# Patient Record
Sex: FEMALE | Race: WHITE | NOT HISPANIC OR LATINO | Employment: OTHER | URBAN - METROPOLITAN AREA
[De-identification: names, ages, dates, MRNs, and addresses within clinical notes are randomized per-mention and may not be internally consistent; named-entity substitution may affect disease eponyms.]

---

## 2017-03-23 ENCOUNTER — ALLSCRIPTS OFFICE VISIT (OUTPATIENT)
Dept: OTHER | Facility: OTHER | Age: 37
End: 2017-03-23

## 2017-04-03 ENCOUNTER — ALLSCRIPTS OFFICE VISIT (OUTPATIENT)
Dept: OTHER | Facility: OTHER | Age: 37
End: 2017-04-03

## 2017-05-05 ENCOUNTER — GENERIC CONVERSION - ENCOUNTER (OUTPATIENT)
Dept: OTHER | Facility: OTHER | Age: 37
End: 2017-05-05

## 2017-05-11 ENCOUNTER — ALLSCRIPTS OFFICE VISIT (OUTPATIENT)
Dept: OTHER | Facility: OTHER | Age: 37
End: 2017-05-11

## 2017-05-11 ENCOUNTER — GENERIC CONVERSION - ENCOUNTER (OUTPATIENT)
Dept: OTHER | Facility: OTHER | Age: 37
End: 2017-05-11

## 2017-05-23 ENCOUNTER — GENERIC CONVERSION - ENCOUNTER (OUTPATIENT)
Dept: OTHER | Facility: OTHER | Age: 37
End: 2017-05-23

## 2017-05-30 ENCOUNTER — ALLSCRIPTS OFFICE VISIT (OUTPATIENT)
Dept: OTHER | Facility: OTHER | Age: 37
End: 2017-05-30

## 2017-06-02 ENCOUNTER — GENERIC CONVERSION - ENCOUNTER (OUTPATIENT)
Dept: OTHER | Facility: OTHER | Age: 37
End: 2017-06-02

## 2017-06-20 ENCOUNTER — ALLSCRIPTS OFFICE VISIT (OUTPATIENT)
Dept: OTHER | Facility: OTHER | Age: 37
End: 2017-06-20

## 2017-06-20 ENCOUNTER — GENERIC CONVERSION - ENCOUNTER (OUTPATIENT)
Dept: OTHER | Facility: OTHER | Age: 37
End: 2017-06-20

## 2017-06-20 DIAGNOSIS — M79.10 MYALGIA: ICD-10-CM

## 2017-06-21 LAB — LYME IGG/IGM AB (HISTORICAL): <0.91 ISR (ref 0–0.9)

## 2017-06-22 ENCOUNTER — GENERIC CONVERSION - ENCOUNTER (OUTPATIENT)
Dept: OTHER | Facility: OTHER | Age: 37
End: 2017-06-22

## 2017-06-24 ENCOUNTER — GENERIC CONVERSION - ENCOUNTER (OUTPATIENT)
Dept: OTHER | Facility: OTHER | Age: 37
End: 2017-06-24

## 2017-06-26 ENCOUNTER — ALLSCRIPTS OFFICE VISIT (OUTPATIENT)
Dept: OTHER | Facility: OTHER | Age: 37
End: 2017-06-26

## 2017-07-07 ENCOUNTER — GENERIC CONVERSION - ENCOUNTER (OUTPATIENT)
Dept: OTHER | Facility: OTHER | Age: 37
End: 2017-07-07

## 2017-08-08 ENCOUNTER — ALLSCRIPTS OFFICE VISIT (OUTPATIENT)
Dept: OTHER | Facility: OTHER | Age: 37
End: 2017-08-08

## 2017-08-09 LAB
A/G RATIO (HISTORICAL): 1.8 (ref 1.2–2.2)
ALBUMIN SERPL BCP-MCNC: 4.2 G/DL (ref 3.5–5.5)
ALP SERPL-CCNC: 50 IU/L (ref 39–117)
ALT SERPL W P-5'-P-CCNC: 18 IU/L (ref 0–32)
AST SERPL W P-5'-P-CCNC: 15 IU/L (ref 0–40)
BASOPHILS # BLD AUTO: 0 %
BASOPHILS # BLD AUTO: 0 X10E3/UL (ref 0–0.2)
BILIRUB SERPL-MCNC: 1.2 MG/DL (ref 0–1.2)
BUN SERPL-MCNC: 9 MG/DL (ref 6–20)
BUN/CREA RATIO (HISTORICAL): 11 (ref 9–23)
CALCIUM SERPL-MCNC: 9.2 MG/DL (ref 8.7–10.2)
CHLORIDE SERPL-SCNC: 103 MMOL/L (ref 96–106)
CHOLEST SERPL-MCNC: 205 MG/DL (ref 100–199)
CO2 SERPL-SCNC: 21 MMOL/L (ref 18–29)
CREAT SERPL-MCNC: 0.81 MG/DL (ref 0.57–1)
DEPRECATED RDW RBC AUTO: 13.1 % (ref 12.3–15.4)
EGFR AFRICAN AMERICAN (HISTORICAL): 110 ML/MIN/1.73
EGFR-AMERICAN CALC (HISTORICAL): 95 ML/MIN/1.73
EOSINOPHIL # BLD AUTO: 0.2 X10E3/UL (ref 0–0.4)
EOSINOPHIL # BLD AUTO: 2 %
GLUCOSE SERPL-MCNC: 99 MG/DL (ref 65–99)
HCT VFR BLD AUTO: 40.3 % (ref 34–46.6)
HDLC SERPL-MCNC: 71 MG/DL
HGB BLD-MCNC: 13.6 G/DL (ref 11.1–15.9)
IMM.GRANULOCYTES (CD4/8) (HISTORICAL): 0 %
IMM.GRANULOCYTES (CD4/8) (HISTORICAL): 0 X10E3/UL (ref 0–0.1)
LDLC SERPL CALC-MCNC: 97 MG/DL (ref 0–99)
LYMPHOCYTES # BLD AUTO: 2.2 X10E3/UL (ref 0.7–3.1)
LYMPHOCYTES # BLD AUTO: 28 %
MCH RBC QN AUTO: 29.4 PG (ref 26.6–33)
MCHC RBC AUTO-ENTMCNC: 33.7 G/DL (ref 31.5–35.7)
MCV RBC AUTO: 87 FL (ref 79–97)
MONOCYTES # BLD AUTO: 0.7 X10E3/UL (ref 0.1–0.9)
MONOCYTES (HISTORICAL): 9 %
NEUTROPHILS # BLD AUTO: 4.8 X10E3/UL (ref 1.4–7)
NEUTROPHILS # BLD AUTO: 61 %
PLATELET # BLD AUTO: 231 X10E3/UL (ref 150–379)
POTASSIUM SERPL-SCNC: 4.2 MMOL/L (ref 3.5–5.2)
RBC (HISTORICAL): 4.63 X10E6/UL (ref 3.77–5.28)
SODIUM SERPL-SCNC: 139 MMOL/L (ref 134–144)
TOT. GLOBULIN, SERUM (HISTORICAL): 2.4 G/DL (ref 1.5–4.5)
TOTAL PROTEIN (HISTORICAL): 6.6 G/DL (ref 6–8.5)
TRIGL SERPL-MCNC: 186 MG/DL (ref 0–149)
TSH SERPL DL<=0.05 MIU/L-ACNC: 1.21 UIU/ML (ref 0.45–4.5)
WBC # BLD AUTO: 7.8 X10E3/UL (ref 3.4–10.8)

## 2017-08-10 ENCOUNTER — GENERIC CONVERSION - ENCOUNTER (OUTPATIENT)
Dept: OTHER | Facility: OTHER | Age: 37
End: 2017-08-10

## 2017-08-29 ENCOUNTER — GENERIC CONVERSION - ENCOUNTER (OUTPATIENT)
Dept: OTHER | Facility: OTHER | Age: 37
End: 2017-08-29

## 2017-10-02 ENCOUNTER — GENERIC CONVERSION - ENCOUNTER (OUTPATIENT)
Dept: OTHER | Facility: OTHER | Age: 37
End: 2017-10-02

## 2017-12-17 ENCOUNTER — OFFICE VISIT (OUTPATIENT)
Dept: URGENT CARE | Facility: CLINIC | Age: 37
End: 2017-12-17
Payer: COMMERCIAL

## 2017-12-17 PROCEDURE — 99213 OFFICE O/P EST LOW 20 MIN: CPT

## 2017-12-20 NOTE — PROGRESS NOTES
Assessment  1  Acute sinusitis (461 9) (J01 90)    Plan  Acute sinusitis    · Cephalexin 500 MG Oral Tablet (Cephalexin Monohydrate); take 1 tablet everytwelve hours    Discussion/Summary  Discussion Summary:   Follow up with pcptake meds as directedkeflex given due to interactions with zpak, was told not to use zpak due to use of other medsno reaction in past with keflex,continue flonase and zyrtec daily  Medication Side Effects Reviewed: Possible side effects of new medications were reviewed with the patient/guardian today  Understands and agrees with treatment plan: The treatment plan was reviewed with the patient/guardian  The patient/guardian understands and agrees with the treatment plan   Counseling Documentation With Imm: The patient was counseled regarding instructions for management,-- patient and family education,-- importance of compliance with treatment  Follow Up Instructions: Follow Up with your Primary Care Provider in 1-2 days  If your symptoms worsen, go to the nearest [place] [address] Emergency Department  Chief Complaint  1  Cold Symptoms  Chief Complaint Free Text Note Form: pt reports sinus pressure, wheezing, rash on face, feels like chest is on fire; denies pain      History of Present Illness  HPI: 31 yo female who is here today with sinus pressure and cough for one week, no meds taken but dayquil and some relief but not much, no fevers  Hospital Based Practices Required Assessment:  Pain Assessment  the patient states they do not have pain  Abuse And Domestic Violence Screen   Yes, the patient is safe at home  -- The patient states no one is hurting them  Depression And Suicide Screen  No, the patient has not had thoughts of hurting themself  No, the patient has not felt depressed in the past 7 days  Prefered Language is  english  Primary Language is  english  Review of Systems  Focused-Female:  Constitutional: as noted in HPI   ENT: as noted in HPI    Cardiovascular: no complaints of slow or fast heart rate, no chest pain, no palpitations, no leg claudication or lower extremity edema  Respiratory: as noted in HPI  ROS Reviewed:   ROS reviewed  Active Problems  1  Abnormal ankle brachial index (796 4) (R68 89)   2  Allergic conjunctivitis (372 14) (H10 10)   3  Benign paroxysmal positional vertigo of right ear (386 11) (H81 11)   4  Cardiac pacemaker (V45 01) (Z95 0)   5  Chronic sinusitis (473 9) (J32 9)   6  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)   7  Ecchymosis (459 89) (R58)   8  Environmental allergies (V15 09) (Z91 09)   9  Fatigue (780 79) (R53 83)   10  Gilbert's syndrome (277 4) (E80 4)   11  Headache (784 0) (R51)   12  Muscle pain (729 1) (M79 1)   13  Neoplasm of uncertain behavior of skin (238 2) (D48 5)   14  Neuralgia (729 2) (M79 2)   15  Palpitations (785 1) (R00 2)   16  Right bundle branch block (426 4) (I45 10)   17  Sinoatrial node dysfunction (427 81) (I49 5)    Past Medical History  1  History of Acute serous otitis media, unspecified laterality   2  History of Cath RVOT Subpulmonary Obstruction   3  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)   4  History of Contact dermatitis due to other agent (692 89) (L25 8)   5  History of acute sinusitis (V12 69) (Z87 09)   6  History of atrial fibrillation (V12 59) (Z86 79)   7  History of eczema (V13 3) (Z87 2)   8  History of gastroesophageal reflux (GERD) (V12 79) (Z87 19)   9  History of hematuria (V13 09) (Z87 448)   10  History of low back pain (V13 59) (Z87 39)   11  History of migraine (V12 49) (Z86 69)   12  History of ovarian cyst (V13 29) (Z87 42)   13  History of sick sinus syndrome (V12 59) (Z86 79)   14  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)   15  History of Neuritis (729 2) (M79 2)   16  Personal history of urinary tract infection (V13 02) (Z15 924)   17   History of Small Intestine Torsion (560 2)  Active Problems And Past Medical History Reviewed: The active problems and past medical history were reviewed and updated today  Family History  Father    1  Family history of Cancer  Sister    2  Family history of Colon Cancer (V16 0)  Maternal Grandmother    3  Family history of Coronary Artery Disease (V17 49)   4  Family history of Hyperlipidemia  Paternal Grandmother    11  Family history of Diabetes Mellitus (V18 0)   6  Family history of Hypertension (V17 49)  Paternal Grandfather    9  Family history of Coronary Artery Disease (V17 49)   8  Family history of Hyperlipidemia  Family History Reviewed: The family history was reviewed and updated today  Social History   · Being A Social Drinker   · Never a smoker  Social History Reviewed: The social history was reviewed and updated today  The social history was reviewed and is unchanged  Surgical History  1  History of Adenoidectomy   2  History of Atrial Septectomy Balloon Transvenous Method   3  History of Laparoscopic Lysis Of Intestinal Adhesions   4  History of Oophorectomy   5  History of Pacemaker Permanent Placement   6  History of Tonsillectomy   7  History of Transposition Repair Mustard Procedure   8  History of Tubal Ligation  Surgical History Reviewed: The surgical history was reviewed and updated today  Current Meds   1  Allegra Allergy 180 MG Oral Tablet; Therapy: (Recorded:14Jbv0637) to Recorded   2  Aspirin 81 MG TABS; Therapy: 33OHH5661 to Recorded   3  Aviane 0 1-20 MG-MCG Oral Tablet; Therapy: 86Okd6698 to Recorded   4  Fluticasone Propionate 50 MCG/ACT Nasal Suspension; 2 sprays each nostril daily; Therapy: 41GSH0810 to (Evaluate:44Mky2771); Last Rx:[id number] Ordered   5  Meclizine HCl - 25 MG Oral Tablet; TAKE 1 TABLET 4 TIMES DAILY AS NEEDED FOR DIZZINESS; Therapy: 78MOC1610 to (Evaluate:02Apr2017)  Requested for: 78STC3671; Last Rx:23Mar2017 Ordered   6   Olopatadine HCl - 0 1 % Ophthalmic Solution; INSTILL 1 DROP INTO AFFECTED EYE(S) TWICE DAILY AS DIRECTED; Therapy: 56TEM4475 to (Last Elwyn Elam)  Requested for: 20Jun2017 Ordered   7  Sotalol HCl - 80 MG Oral Tablet; Take 1 tablet twice daily Recorded   8  Vitamin C TABS; Therapy: (Recorded:04Mar2013) to Recorded  Medication List Reviewed: The medication list was reviewed and updated today  Allergies  1  Penicillins   2  Enalapril Maleate TABS    Vitals  Signs   Recorded: 29Ncm6522 10:56AM   Temperature: 99 1 F  Heart Rate: 91  Respiration: 18  Systolic: 610  Diastolic: 59  Height: 5 ft 3 in  Weight: 133 lb   BMI Calculated: 23 56  BSA Calculated: 1 63  O2 Saturation: 97  Pain Scale: 0    Physical Exam   Constitutional  General appearance: No acute distress, well appearing and well nourished  Eyes  Conjunctiva and lids: No swelling, erythema or discharge  Pupils and irises: Equal, round and reactive to light  Ears, Nose, Mouth, and Throat  External inspection of ears and nose: Normal    Otoscopic examination: Abnormal  -- TMs visualized also a reflex on the right side only  Nasal mucosa, septum, and turbinates: Abnormal  -- turbinates are red and inflamed with thick yellowish-green mucus in the right near left naris turbinates are pink and boggy with thick mucus noted  Oropharynx: Abnormal  -- yellowish-green mucus in the back of the oropharynx postnasal drip evident  Pulmonary  Respiratory effort: No increased work of breathing or signs of respiratory distress  -- mild cough  Auscultation of lungs: Clear to auscultation  Cardiovascular  Auscultation of heart: Abnormal  -- loud murmur heard patient aware  Skin  Skin and subcutaneous tissue: Normal without rashes or lesions  Psychiatric  Orientation to person, place, and time: Normal    Mood and affect: Normal        Signatures   Electronically signed by :  SAE Caballero; Dec 17 2017  1:21PM EST                       (Author)    Electronically signed by : Manolo Lobato DO; Dec 19 2017 10:40AM EST (Co-author)

## 2018-01-09 ENCOUNTER — ALLSCRIPTS OFFICE VISIT (OUTPATIENT)
Dept: OTHER | Facility: OTHER | Age: 38
End: 2018-01-09

## 2018-01-09 DIAGNOSIS — J01.01 ACUTE RECURRENT MAXILLARY SINUSITIS: ICD-10-CM

## 2018-01-09 DIAGNOSIS — J01.90 ACUTE SINUSITIS: ICD-10-CM

## 2018-01-09 DIAGNOSIS — J32.9 CHRONIC SINUSITIS: ICD-10-CM

## 2018-01-10 NOTE — RESULT NOTES
Verified Results  (1) CBC/PLT/DIFF 04Apr2016 07:46AM [facility]     Test Name Result Flag Reference   WBC 7 3 x10E3/uL  3 4-10 8   RBC 4 74 x10E6/uL  3 77-5 28   Hemoglobin 14 0 g/dL  11 1-15 9   Hematocrit 41 8 %  34 0-46  6   MCV 88 fL  79-97   MCH 29 5 pg  26 6-33 0   MCHC 33 5 g/dL  31 5-35 7   RDW 12 7 %  12 3-15 4   Platelets 830 T27G5/NH  150-379   Neutrophils 68 %     Lymphs 22 %     Monocytes 7 %     Eos 3 %     Basos 0 %     Neutrophils (Absolute) 4 9 x10E3/uL  1 4-7 0   Lymphs (Absolute) 1 6 x10E3/uL  0 7-3 1   Monocytes(Absolute) 0 5 x10E3/uL  0 1-0 9   Eos (Absolute) 0 2 x10E3/uL  0 0-0 4   Baso (Absolute) 0 0 x10E3/uL  0 0-0 2   Immature Granulocytes 0 %     Immature Grans (Abs) 0 0 x10E3/uL  0 0-0 1     (1) COMPREHENSIVE METABOLIC PANEL [id number] 12:13QE [facility]     Test Name Result Flag Reference   Glucose, Serum 85 mg/dL  65-99   BUN 7 mg/dL  6-20   Creatinine, Serum 0 81 mg/dL  0 57-1 00   eGFR If NonAfricn Am 96 mL/min/1 73  >59   eGFR If Africn Am 110 mL/min/1 73  >59   BUN/Creatinine Ratio 9  8-20   Sodium, Serum 135 mmol/L  134-144   Potassium, Serum 4 0 mmol/L  3 5-5 2   Chloride, Serum 97 mmol/L     Carbon Dioxide, Total 21 mmol/L  18-29   Calcium, Serum 9 3 mg/dL  8 7-10 2   Protein, Total, Serum 6 8 g/dL  6 0-8 5   Albumin, Serum 4 4 g/dL  3 5-5 5   Globulin, Total 2 4 g/dL  1 5-4 5   A/G Ratio 1 8  1 1-2 5   Bilirubin, Total 1 4 mg/dL H 0 0-1 2   Alkaline Phosphatase, S 40 IU/L     AST (SGOT) 23 IU/L  0-40   ALT (SGPT) 20 IU/L  0-32     (1) LIPID PANEL, FASTING 04Apr2016 07:46AM [facility]     Test Name Result Flag Reference   Cholesterol, Total 191 mg/dL  100-199   Triglycerides 150 mg/dL H 0-149   HDL Cholesterol 68 mg/dL  >39   According to ATP-III Guidelines, HDL-C >59 mg/dL is considered a  negative risk factor for CHD  VLDL Cholesterol [place] 30 mg/dL  5-40   LDL Cholesterol Calc 93 mg/dL  0-99   T  Chol/HDL Ratio 2 8 ratio units  0 0-4 4   T   Chol/HDL Ratio Men  Women                                               1/2 Avg  Risk  3 4    3 3                                                   Avg Risk  5 0    4 4                                                2X Avg  Risk  9 6    7 1                                                3X Avg  Risk 23 4   11 0

## 2018-01-10 NOTE — RESULT NOTES
Verified Results  (1) CBC/PLT/DIFF [id number] 06:41AM [facility]     Test Name Result Flag Reference   WBC 7 8 x10E3/uL  3 4-10 8   RBC 4 63 x10E6/uL  3 77-5 28   Hemoglobin 13 6 g/dL  11 1-15 9   Hematocrit 40 3 %  34 0-46  6   MCV 87 fL  79-97   MCH 29 4 pg  26 6-33 0   MCHC 33 7 g/dL  31 5-35 7   RDW 13 1 %  12 3-15 4   Platelets 902 S40A2/ET  150-379   Neutrophils 61 %     Lymphs 28 %     Monocytes 9 %     Eos 2 %     Basos 0 %     Neutrophils (Absolute) 4 8 x10E3/uL  1 4-7 0   Lymphs (Absolute) 2 2 x10E3/uL  0 7-3 1   Monocytes(Absolute) 0 7 x10E3/uL  0 1-0 9   Eos (Absolute) 0 2 x10E3/uL  0 0-0 4   Baso (Absolute) 0 0 x10E3/uL  0 0-0 2   Immature Granulocytes 0 %     Immature Grans (Abs) 0 0 x10E3/uL  0 0-0 1     (1) COMPREHENSIVE METABOLIC PANEL [id number] 03:52LT [facility]     Test Name Result Flag Reference   Glucose, Serum 99 mg/dL  65-99   BUN 9 mg/dL  6-20   Creatinine, Serum 0 81 mg/dL  0 57-1 00   BUN/Creatinine Ratio 11  9-23   Sodium, Serum 139 mmol/L  134-144   Potassium, Serum 4 2 mmol/L  3 5-5 2   Chloride, Serum 103 mmol/L     Carbon Dioxide, Total 21 mmol/L  18-29   Calcium, Serum 9 2 mg/dL  8 7-10 2   Protein, Total, Serum 6 6 g/dL  6 0-8 5   Albumin, Serum 4 2 g/dL  3 5-5 5   Globulin, Total 2 4 g/dL  1 5-4 5   A/G Ratio 1 8  1 2-2 2   Bilirubin, Total 1 2 mg/dL  0 0-1 2   Alkaline Phosphatase, S 50 IU/L     AST (SGOT) 15 IU/L  0-40   ALT (SGPT) 18 IU/L  0-32   eGFR If NonAfricn Am 95 mL/min/1 73  >59   eGFR If Africn Am 110 mL/min/1 73  >59     (1) LIPID PANEL FASTING W DIRECT LDL REFLEX [id number] 06:41AM [facility]     Test Name Result Flag Reference   Cholesterol, Total 205 mg/dL H 100-199   Triglycerides 186 mg/dL H 0-149   HDL Cholesterol 71 mg/dL  >39   LDL Cholesterol Calc 97 mg/dL  0-99     (1) TSH [id number] 06:41AM [facility]     Test Name Result Flag Reference   TSH 1 210 uIU/mL  0 450-4 500

## 2018-01-11 NOTE — PROGRESS NOTES
Assessment   1  Allergic rhinitis, unspecified allergic rhinitis type (477 9) (J30 9)  2  Encounter for preventive health examination (V70 0) (Z00 00)  3  Fatigue (780 79) (R53 83)    Plan  Abdominal pain, right upper quadrant, Nausea    · Stop: Ondansetron 8 MG Oral Tablet Dispersible    Discussion/Summary  health maintenance visit Currently, she eats a healthy diet  cervical cancer screening is managed by GYN  Breast cancer screening: breast cancer screening is managed by GYN  Colorectal cancer screening: colorectal cancer screening is not indicated  Osteoporosis screening: bone mineral density testing is not indicated  Advice and education were given regarding nutrition  Patient discussion: discussed with the patient  Patient to continue routine pacer checks, and cardiology visits  Recommend 3 meals daily with protein  RTO as needed  Chief Complaint  PT IS HERE FOR HER YEARLY WELLNESS EXAM AND REVIEW RECENT LABS  PT IS UP TO DATE ON HER TDAP  GYN APPT IS DUE IN JULY  History of Present Illness  HM, Adult Female: The patient is being seen for a health maintenance evaluation  General Health:   Screening:   HPI: 37year old white female presents for PE  Patient has a pacemaker, gets checked regularly  Has GYN for well woman exam, last pap July 2015  Blood work done recently, and was called about results  Review of Systems    Constitutional: feeling tired, but no fever and no chills  Eyes: eyesight problems and Wears eye glasses, last exam 1/16  ENT: no earache, no sore throat and no nasal discharge  Cardiovascular: no chest pain and no palpitations  Respiratory: no shortness of breath, no cough and no shortness of breath during exertion  Gastrointestinal: no abdominal pain, no nausea and no vomiting  Neurological: no headache, no numbness, no tingling, no dizziness and no fainting  Psychiatric: not suicidal, no anxiety, no sleep disturbances and no depression       Over the past 2 weeks, how often have you been bothered by the following problems? 1 ) Little interest or pleasure in doing things? Not at all    2 ) Feeling down, depressed or hopeless? Not at all    3 ) Trouble falling asleep or sleeping too much? Not at all    4 ) Feeling tired or having little energy? Several days  5 ) Poor appetite or overeating? Not at all    6 ) Feeling bad about yourself, or that you are a failure, or have let yourself or your family down? Not at all    7 ) Trouble concentrating on things, such as reading a newspaper or watching television? Not at all    8 ) Moving or speaking so slowly that other people could have noticed, or the opposite, moving or speaking faster than usual? Not at all    9 ) Thoughts that you would be better off dead or of hurting yourself in some way? 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)  4  Fatigue (780 79) (R53 83)  5  Gilbert's syndrome (277 4) (E80 4)  6  Nausea (787 02) (R11 0)  7  Right bundle branch block (426 4) (I45 10)  8   Sinoatrial node dysfunction (427 81) (I49 5)    Past Medical History    · History of Abdominal pain, suprapubic (789 09) (R10 2)   · History of Acute serous otitis media, unspecified laterality   · History of Cath RVOT Subpulmonary Obstruction   · History of Dysfunction of right Eustachian tube (381 81) (H69 81)   · History of abdominal pain (V13 89) (T93 918)   · History of abdominal pain (V13 89) (M36 906)   · History of atrial fibrillation (V12 59) (Z86 79)   · History of eczema (V13 3) (Z87 2)   · History of eczema (V13 3) (Z87 2)   · History of gastroesophageal reflux (GERD) (V12 79) (Z87 19)   · History of hematuria (V13 09) (Z87 448)   · History of hematuria (V13 09) (Z87 448)   · History of low back pain (V13 59) (Z87 39)   · History of migraine (V12 49) (Z86 69)   · History of ovarian cyst (V13 29) (Z87 42)   · History of pharyngitis (V12 69) (Z87 09)   · History of sick sinus syndrome (V12 59) (Z86 79)   · History of sinusitis (V12 69) (Z87 09)   · History of skin disorder (V13 3) (Z87 2)   · History of tinea corporis (V12 09) (Z86 19)   · History of urticaria (V13 3) (Z87 2)   · History of vertigo (V12 49) (C48 065)   · History of viral warts (V12 09) (Z86 19)   · 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)   · History of Need for vaccination for DTP (V06 1) (Z23)   · History of Neuritis (729 2) (M79 2)   · History of Other sinusitis (473 8) (J32 9)   · Personal history of urinary tract infection (V13 02) (Z87 440)   · History of Serous otitis media, right   · History of Skin rash (782 1) (R21)   · History of Small Intestine Torsion (560 2)   · History of Symptoms involving urinary system (788 99) (R39 9)   · History of Symptoms involving urinary system (788 99) (R39 9)   · History of Tooth pain (525 9) (K08 8)   · History of X-Ray UGI Small Bowel Obstruction    Surgical History    · History of Adenoidectomy   · History of Atrial Septectomy Balloon Transvenous Method   · History of Laparoscopic Lysis Of Intestinal Adhesions   · History of Oophorectomy   · History of Pacemaker Permanent Placement   · History of Tonsillectomy   · History of Transposition Repair Mustard Procedure   · History of Tubal Ligation    Family History  Father    · Family history of Cancer  Sister    · Family history of Colon Cancer (V16 0)  Maternal Grandmother    · Family history of Coronary Artery Disease (V17 49)   · Family history of Hyperlipidemia  Paternal Grandmother    · Family history of Diabetes Mellitus (V18 0)   · Family history of Hypertension (V17 49)  Paternal Grandfather    · Family history of Coronary Artery Disease (V17 49)   · Family history of Hyperlipidemia    Social History    · Being A Social Drinker   · Never A Smoker    Current Meds  1  Aspirin 81 MG Oral Tablet;    Therapy: 33QPH1838 to Recorded  2  Atenolol 25 MG Oral Tablet; TAKE 1/2 TABLET TWICE DAILY; Therapy: (Recorded:03Mar2013) to Recorded  3  Aviane 0 1-20 MG-MCG Oral Tablet; Therapy: 31Aug2015 to Recorded  4  Children Multi-Vitamin CHEW;   Therapy: (Recorded:04Mar2013) to Recorded  5  Claritin 10 MG Oral Capsule; Therapy: 98MFW6281 to Recorded  6  Fluticasone Propionate 50 MCG/ACT Nasal Suspension; 2 sprays each nostril daily; Therapy: 81BDF7734 to (Evaluate:37Gvl2273); Last Rx:[id number] Ordered  7  Ondansetron 8 MG Oral Tablet Dispersible; put 1 tab under tongue to dissolve every 8   hours as needed for nausea and vomiting; Therapy: 01Apr2016 to (Last Rx:01Apr2016) Ordered  8  Sotalol HCl - 80 MG Oral Tablet; Take 1 tablet twice daily Recorded  9  Vitamin C TABS; Therapy: (Recorded:04Mar2013) to Recorded    Allergies   1  Penicillins  2  Enalapril Maleate TABS    Vitals   Recorded: 19Apr2016 03:44PM   Temperature 98 7 F   Heart Rate 84   Systolic 713   Diastolic 62   Height 5 ft 3 in   Weight 135 lb 8 0 oz   BMI Calculated 24   BSA Calculated 1 64   O2 Saturation 98     Physical Exam    Constitutional   General appearance: No acute distress, well appearing and well nourished  Head and Face   Head and face: Normal     Eyes   Conjunctiva and lids: No swelling, erythema or discharge  Pupils and irises: Equal, round, reactive to light  Ears, Nose, Mouth, and Throat   External inspection of ears and nose: Normal     Otoscopic examination: Tympanic membranes translucent with normal light reflex  Canals patent without erythema  Congestion noted in middle ears  Hearing: Normal     Nasal mucosa, septum, and turbinates: Abnormal   Turbinates inflamed  Lips, teeth, and gums: Normal, good dentition  Oropharynx: Normal with no erythema, edema, exudate or lesions  Neck   Neck: Supple, symmetric, trachea midline, no masses      Pulmonary   Respiratory effort: No increased work of breathing or signs of respiratory distress  Auscultation of lungs: Clear to auscultation  Cardiovascular   Auscultation of heart: Normal rate and rhythm, normal S1 and S2, no murmurs  Examination of extremities for edema and/or varicosities: Normal     Abdomen   Abdomen: Non-tender, no masses  Liver and spleen: No hepatomegaly or splenomegaly  Musculoskeletal   Gait and station: Normal     Digits and nails: Normal without clubbing or cyanosis  Joints, bones, and muscles: Normal     Range of motion: Normal     Stability: Normal     Muscle strength/tone: Normal     Neurologic   Cranial nerves: Cranial nerves II-XII intact  Reflexes: 2+ and symmetric  Sensation: No sensory loss  Psychiatric   Judgment and insight: Normal     Orientation to person, place, and time: Normal     Recent and remote memory: Intact  Mood and affect: Normal        Signatures   Electronically signed by : NKCEHI Galo; Apr 19 2016  4:19PM EST                       (Author)    Electronically signed by : Francisco Madsen DO;  Apr 19 2016  4:43PM EST                       (Author)

## 2018-01-13 VITALS
BODY MASS INDEX: 24.85 KG/M2 | HEIGHT: 63 IN | HEART RATE: 77 BPM | TEMPERATURE: 100.2 F | WEIGHT: 140.25 LBS | SYSTOLIC BLOOD PRESSURE: 120 MMHG | DIASTOLIC BLOOD PRESSURE: 78 MMHG | OXYGEN SATURATION: 96 %

## 2018-01-13 VITALS
HEIGHT: 63 IN | SYSTOLIC BLOOD PRESSURE: 122 MMHG | BODY MASS INDEX: 25.07 KG/M2 | TEMPERATURE: 99.5 F | HEART RATE: 80 BPM | OXYGEN SATURATION: 98 % | WEIGHT: 141.5 LBS | DIASTOLIC BLOOD PRESSURE: 78 MMHG

## 2018-01-13 VITALS
BODY MASS INDEX: 24.23 KG/M2 | OXYGEN SATURATION: 100 % | SYSTOLIC BLOOD PRESSURE: 116 MMHG | HEIGHT: 63 IN | TEMPERATURE: 99.3 F | DIASTOLIC BLOOD PRESSURE: 76 MMHG | HEART RATE: 93 BPM | WEIGHT: 136.75 LBS

## 2018-01-13 VITALS
OXYGEN SATURATION: 97 % | BODY MASS INDEX: 24.98 KG/M2 | HEART RATE: 79 BPM | DIASTOLIC BLOOD PRESSURE: 64 MMHG | TEMPERATURE: 98 F | HEIGHT: 63 IN | WEIGHT: 141 LBS | SYSTOLIC BLOOD PRESSURE: 108 MMHG

## 2018-01-13 NOTE — RESULT NOTES
Discussion/Summary   Please let the pt know her lyme test was negative         Verified Results  (LC) Lyme, Total Ab Test/Reflex 20Jun2017 12:00AM [facility]     Test Name Result Flag Reference   Lyme IgG/IgM Ab <0 91 ISR  0 00-0 90   Negative         <0 91                                                 Equivocal  0 91 - 1 09                                                 Positive         >1 09

## 2018-01-13 NOTE — MISCELLANEOUS
Message  Message Free Text Note Form: Pt started Doxy on the 20 of june  She has a left sided HA, no photo or phono phobia, no visual changes, no body weakness  H/O transposition of the great arteries, AV node dysfunction   Already tried Tylenol, Advil , and other OTC  Plan:  Pred 20 bid for 3 days to try to break the cycle  Call if worse      Signatures   Electronically signed by : Willie Finney DO; Jun 24 2017  8:41PM EST                       (Author)

## 2018-01-14 VITALS
WEIGHT: 139.75 LBS | TEMPERATURE: 99.1 F | BODY MASS INDEX: 24.76 KG/M2 | SYSTOLIC BLOOD PRESSURE: 110 MMHG | DIASTOLIC BLOOD PRESSURE: 62 MMHG | OXYGEN SATURATION: 97 % | HEART RATE: 81 BPM | HEIGHT: 63 IN

## 2018-01-14 VITALS
HEIGHT: 63 IN | OXYGEN SATURATION: 97 % | HEART RATE: 79 BPM | SYSTOLIC BLOOD PRESSURE: 108 MMHG | WEIGHT: 141 LBS | DIASTOLIC BLOOD PRESSURE: 64 MMHG | TEMPERATURE: 98 F | BODY MASS INDEX: 24.98 KG/M2

## 2018-01-15 VITALS
DIASTOLIC BLOOD PRESSURE: 70 MMHG | HEIGHT: 63 IN | OXYGEN SATURATION: 98 % | SYSTOLIC BLOOD PRESSURE: 100 MMHG | TEMPERATURE: 99.3 F | HEART RATE: 89 BPM | BODY MASS INDEX: 24.94 KG/M2 | WEIGHT: 140.75 LBS

## 2018-01-16 NOTE — RESULT NOTES
Verified Results  (1) THROAT CULTURE (CULTURE, UPPER RESPIRATORY) [id number] 12:00AM [facility]     Test Name Result Flag Reference   Upper Respiratory Culture Final report     Result 1 Comment     Routine respiratory aniceto  Please indicate source of specimen on all future request forms

## 2018-01-18 NOTE — PROGRESS NOTES
Assessment    1  Encounter for preventive health examination (V70 0) (Z00 00)   2  Never a smoker   3  Benign paroxysmal positional vertigo of right ear (386 11) (H81 11)   4  Contact dermatitis due to other agent (692 89) (L25 8)    Plan  Acute sinusitis, PMH: Tick bite, initial encounter    · Doxycycline Hyclate 100 MG Oral Tablet  Benign paroxysmal positional vertigo of right ear    · Promethazine HCl - 25 MG Oral Tablet; TAKE 1 TABLET THREE TIMES A DAY AS  NEEDED NAUSEA  Health Maintenance    · Always use a seat belt and shoulder strap when riding or driving a motor vehicle ;  Status:Complete;   Done: 11ONY7858   · Brush your teeth 3 times a day and floss at least once a day ; Status:Complete;   Done:  06VAR3855   · Eat foods that are high in calcium ; Status:Complete;   Done: 39OMI3736   · We encourage all of our patients to exercise regularly  30 minutes of exercise or physical  activity five or more days a week is recommended for children and adults ;  Status:Complete;   Done: 03HJB3249   · We recommend routine visits to a dentist ; Status:Complete;   Done: 50JQO0916   · We recommend that you change your eating habits slowly ; Status:Complete;   Done:  88BAD6870   · Call [phone] if: You find a new or different kind of lump in your breast ;  Status:Complete;   Done: 50POM6651   · Call [phone] if: You have any warning signs of skin cancer ; Status:Complete;    Done: 88JAN5573    Discussion/Summary  health maintenance visit healthy adult female Currently, she eats a healthy diet and has an inadequate exercise regimen  cervical cancer screening is needed every three years Breast cancer screening: breast cancer screening is not indicated  Colorectal cancer screening: colorectal cancer screening is not indicated  Osteoporosis screening: bone mineral density testing is not indicated  The immunizations are up to date  Patient discussion: discussed with the patient       1) PROMETHAZINE 1 TAB 3 TIMES A DAY AS NEEDED FOR NAUSEA   2)contact dermatitis: apply CORTAID 10 TO LEFT SIDE OF NECK TWICE A DAY FOR 7 DAYS  3) CONSIDER VESTIBULAR THERAPY  4) ENT EVALUATION- '  5) INCREASE ACTIVITY ONCE DIZZINESS RESOLVES  Possible side effects of new medications were reviewed with the patient/guardian today  The treatment plan was reviewed with the patient/guardian  The patient/guardian understands and agrees with the treatment plan      Chief Complaint  PATIENT IS HERE FOR ANNUAL PHYSICAL  SHE DOES NOT NEED ANY PAPER WORK COMPLETED  History of Present Illness  HM, Adult Female: The patient is being seen for a health maintenance evaluation  The last health maintenance visit was 1 year(s) ago  General Health: The patient's health since the last visit is described as good  She has regular dental visits  She denies vision problems  She denies hearing loss  Immunizations status: up to date  Lifestyle:  She consumes a diverse and healthy diet  She does not have any weight concerns  She does not exercise regularly  She does not use tobacco  She denies alcohol use  She denies drug use  Reproductive health: the patient is premenopausal    Screening: cancer screening reviewed and updated  metabolic screening reviewed and updated  risk screening reviewed and updated  HPI: PT HERE FOR WELL CHECK  COMPLAINS OF INTERMITTENT DIZZINESS ongoing  meclizine helps somewhat if she catches it early , notices with positional changes, bending over    had recent visit with her cardiologist and she referred her to ENT at [facility]  nausea, dizziness and headache all come together and then resolve completely   noticed some itching on the left side of her neck for past 2 days  Review of Systems    Constitutional: No fever, no chills, feels well, no tiredness, no recent weight gain or weight loss  Eyes: No complaints of eye pain, no red eyes, no eyesight problems, no discharge, no dry eyes, no itching of eyes     ENT: nasal discharge, but no complaints of earache, no loss of hearing, no nose bleeds, no nasal discharge, no sore throat, no hoarseness and as noted in HPI  Cardiovascular: No complaints of slow heart rate, no fast heart rate, no chest pain, no palpitations, no leg claudication, no lower extremity edema  Respiratory: No complaints of shortness of breath, no wheezing, no cough, no SOB on exertion, no orthopnea, no PND  Gastrointestinal: nausea, but as noted in HPI  Genitourinary: No complaints of dysuria, no incontinence, no pelvic pain, no dysmenorrhea, no vaginal discharge or bleeding  Musculoskeletal: No complaints of arthralgias, no myalgias, no joint swelling or stiffness, no limb pain or swelling  Integumentary: No complaints of skin rash or lesions, no itching, no skin wounds, no breast pain or lump  Neurological: headache and dizziness, but as noted in HPI  Active Problems    1  Acute sinusitis (461 9) (J01 90)   2  Benign paroxysmal positional vertigo of right ear (386 11) (H81 11)   3  Cardiac pacemaker (V45 01) (Z95 0)   4  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)   5  Environmental allergies (V15 09) (Z91 09)   6  Fatigue (780 79) (R53 83)   7  Gilbert's syndrome (277 4) (E80 4)   8  Neuralgia (729 2) (M79 2)   9  Right bundle branch block (426 4) (I45 10)   10   Sinoatrial node dysfunction (427 81) (I49 5)    Past Medical History    · History of Acute serous otitis media, unspecified laterality   · History of Cath RVOT Subpulmonary Obstruction   · 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)   · History of atrial fibrillation (V12 59) (Z86 79)   · History of eczema (V13 3) (Z87 2)   · History of gastroesophageal reflux (GERD) (V12 79) (Z87 19)   · History of hematuria (V13 09) (Z87 448)   · History of low back pain (V13 59) (Z87 39)   · History of migraine (V12 49) (Z86 69)   · History of ovarian cyst (V13 29) (Z87 42)   · History of sick sinus syndrome (V12 59) (Z86 79)   · 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)   · History of Neuritis (729 2) (M79 2)   · Personal history of urinary tract infection (V13 02) (Z87 440)   · History of Small Intestine Torsion (560 2)    Surgical History    · History of Adenoidectomy   · History of Atrial Septectomy Balloon Transvenous Method   · History of Laparoscopic Lysis Of Intestinal Adhesions   · History of Oophorectomy   · History of Pacemaker Permanent Placement   · History of Tonsillectomy   · History of Transposition Repair Mustard Procedure   · History of Tubal Ligation    Family History  Father    · Family history of Cancer  Sister    · Family history of Colon Cancer (V16 0)  Maternal Grandmother    · Family history of Coronary Artery Disease (V17 49)   · Family history of Hyperlipidemia  Paternal Grandmother    · Family history of Diabetes Mellitus (V18 0)   · Family history of Hypertension (V17 49)  Paternal Grandfather    · Family history of Coronary Artery Disease (V17 49)   · Family history of Hyperlipidemia    Social History    · Being A Social Drinker   · Never a smoker    Current Meds   1  Aspirin 81 MG TABS; Therapy: 06MHM0812 to Recorded   2  Aviane 0 1-20 MG-MCG Oral Tablet; Therapy: 30Veo3180 to Recorded   3  Doxycycline Hyclate 100 MG Oral Tablet; TAKE 1 TABLET TWICE DAILY; Therapy: 39UJR4593 to (Evaluate:21May2017)  Requested for: 37XDY8234; Last   Rx:11May2017 Ordered   4  Fluticasone Propionate 50 MCG/ACT Nasal Suspension; 2 sprays each nostril daily; Therapy: 12MCY4623 to (Evaluate:09Zbu9535); Last Rx:28Gwi1366 Ordered   5  Meclizine HCl - 25 MG Oral Tablet; TAKE 1 TABLET 4 TIMES DAILY AS NEEDED FOR   DIZZINESS; Therapy: 64TFI6201 to (Evaluate:02Apr2017)  Requested for: 99KUN7827; Last   Rx:23Mar2017 Ordered   6  Sotalol HCl - 80 MG Oral Tablet; Take 1 tablet twice daily Recorded   7  Vitamin C TABS; Therapy: (Recorded:04Mar2013) to Recorded   8   ZyrTEC Allergy 10 MG Oral Capsule; Take 1 tablet daily; Therapy: (Recorded:72Tjb0083) to Recorded    Allergies    1  Penicillins   2  Enalapril Maleate TABS    Vitals   Recorded: 63KQN1151 04:41PM   Temperature 99 4 F   Heart Rate 85   Systolic 211   Diastolic 68   Height 5 ft 3 in   Weight 142 lb 8 oz   BMI Calculated 25 24   BSA Calculated 1 67   O2 Saturation 98     Physical Exam    Constitutional   General appearance: No acute distress, well appearing and well nourished  Head and Face   Head and face: Normal     Palpation of the face and sinuses: No sinus tenderness  Eyes   Conjunctiva and lids: No swelling, erythema or discharge  Pupils and irises: Equal, round, reactive to light  Ears, Nose, Mouth, and Throat   External inspection of ears and nose: Normal     Otoscopic examination: Tympanic membranes translucent with normal light reflex  Canals patent without erythema  Hearing: Normal     Nasal mucosa, septum, and turbinates: Abnormal   mild congestion  Lips, teeth, and gums: Normal, good dentition  Oropharynx: Normal with no erythema, edema, exudate or lesions  Neck   Neck: Supple, symmetric, trachea midline, no masses  Pulmonary   Respiratory effort: No increased work of breathing or signs of respiratory distress  Percussion of chest: Normal     Auscultation of lungs: Clear to auscultation  Cardiovascular   Auscultation of heart: Normal rate and rhythm, normal S1 and S2, no murmurs  Examination of extremities for edema and/or varicosities: Normal     Lymphatic   Palpation of lymph nodes in neck: No lymphadenopathy  Musculoskeletal   Gait and station: Normal     Digits and nails: Normal without clubbing or cyanosis  Joints, bones, and muscles: Normal     Range of motion: Normal     Stability: Normal     Muscle strength/tone: Normal     Skin   Skin and subcutaneous tissue: Abnormal   small area of erythema, left neck 1 0cm raised  Neurologic   Cortical function: Normal mental status  Coordination: Normal finger to nose and heel to shin  Psychiatric   Judgment and insight: Normal     Orientation to person, place, and time: Normal     Recent and remote memory: Intact      Mood and affect: Normal        Signatures   Electronically signed by : David Orona DO; May 30 2017 11:04PM EST                       (Author)

## 2018-01-18 NOTE — RESULT NOTES
Verified Results  * CT ABDOMEN PELVIS W CONTRAST 02Apr2016 08:29AM Marisela Hogue     Test Name Result Flag Reference   CT ABDOMEN PELVIS W CONTRAST (Report)     CT ABDOMEN AND PELVIS WITH IV CONTRAST     INDICATION: Intermittent right upper quadrant pain x2 weeks  COMPARISON: 5/11/2013, 2/8/2005  TECHNIQUE: CT examination of the abdomen and pelvis was performed after the administration of intravenous contrast  This examination, like all CT scans performed in the [facility], was performed utilizing techniques to minimize    radiation dose exposure, including the use of iterative reconstruction and automated exposure control  Axial, sagittal, and coronal reformatted images were submitted for interpretation  100 cc of intravenous Omnipaque 350 was administered for this    examination  Enteric contrast was given  FINDINGS:     ABDOMEN     LOWER CHEST: No significant abnormalities identified in the lower chest  Epicardial pacer leads again noted  LIVER/BILIARY TREE: Unremarkable  GALLBLADDER: No calcified gallstones  No pericholecystic inflammatory change  SPLEEN: Unremarkable  PANCREAS: Unremarkable  ADRENAL GLANDS: Unremarkable  KIDNEYS/URETERS: Unremarkable  No hydronephrosis  STOMACH AND BOWEL: Unremarkable  APPENDIX: No findings to suggest appendicitis  ABDOMINOPELVIC CAVITY: There is no change in a few prominent left para-aortic lymph nodes since the study of 2005, measuring up to 1 6 cm in short axis  No new retroperitoneal adenopathy  No mesenteric adenopathy  No ascites  Retroperitoneal    surgical clips again noted  VESSELS: No evidence of aneurysm  Multiple prominent vessels in the right adnexa appear stable  PELVIS     REPRODUCTIVE ORGANS: Unremarkable for patient's age  URINARY BLADDER: Unremarkable  ABDOMINAL WALL/INGUINAL REGIONS: Stable pacer generator pack in the left anterior abdominal wall       OSSEOUS STRUCTURES: No acute fracture or destructive osseous lesion  IMPRESSION:     1  No acute pathology  2  Stable prominent right adnexal vessels which can be seen in the setting of pelvic congestion syndrome         Workstation performed: [id number]     Signed by:   Melania Snow MD   4/4/16

## 2018-01-19 ENCOUNTER — HOSPITAL ENCOUNTER (OUTPATIENT)
Dept: CT IMAGING | Facility: HOSPITAL | Age: 38
Discharge: HOME | End: 2018-01-19
Payer: COMMERCIAL

## 2018-01-19 DIAGNOSIS — J01.90 ACUTE SINUSITIS: ICD-10-CM

## 2018-01-19 DIAGNOSIS — J32.9 CHRONIC SINUSITIS: ICD-10-CM

## 2018-01-19 DIAGNOSIS — J01.01 ACUTE RECURRENT MAXILLARY SINUSITIS: ICD-10-CM

## 2018-01-19 PROCEDURE — 70486 CT MAXILLOFACIAL W/O DYE: CPT

## 2018-01-22 ENCOUNTER — GENERIC CONVERSION - ENCOUNTER (OUTPATIENT)
Dept: OTHER | Facility: OTHER | Age: 38
End: 2018-01-22

## 2018-01-22 VITALS
SYSTOLIC BLOOD PRESSURE: 106 MMHG | HEIGHT: 63 IN | DIASTOLIC BLOOD PRESSURE: 68 MMHG | WEIGHT: 142.5 LBS | BODY MASS INDEX: 25.25 KG/M2 | OXYGEN SATURATION: 98 % | TEMPERATURE: 99.4 F | HEART RATE: 85 BPM

## 2018-01-23 VITALS
WEIGHT: 133.5 LBS | HEIGHT: 63 IN | OXYGEN SATURATION: 98 % | SYSTOLIC BLOOD PRESSURE: 108 MMHG | DIASTOLIC BLOOD PRESSURE: 60 MMHG | TEMPERATURE: 99.5 F | HEART RATE: 72 BPM | BODY MASS INDEX: 23.65 KG/M2

## 2018-01-23 VITALS
SYSTOLIC BLOOD PRESSURE: 103 MMHG | TEMPERATURE: 99.1 F | BODY MASS INDEX: 23.57 KG/M2 | DIASTOLIC BLOOD PRESSURE: 59 MMHG | RESPIRATION RATE: 18 BRPM | OXYGEN SATURATION: 97 % | WEIGHT: 133 LBS | HEART RATE: 91 BPM | HEIGHT: 63 IN

## 2018-01-24 NOTE — RESULT NOTES
Verified Results  CT SINUS WO CONTRAST [id number] 06:45PM Hernan Duenas Order Number: [id number]    - Patient Instructions: To schedule this appointment, please contact Central Scheduling at [phone]  Test Name Result Flag Reference   CT SINUS WO CONTRAST (Report)     CT SINUSES     INDICATION: 72-year-old female, chronic sinus disease, facial pain     COMPARISON: None  TECHNIQUE: Axial CT imaging through the sinuses was performed  In addition, sagittal and coronal reformatted images were submitted for interpretation  Radiation dose length product (DLP) for this visit: 260 mGy-cm   This examination, like all CT scans performed in the [facility], was performed utilizing techniques to minimize radiation dose exposure, including the use of iterative    reconstruction and automated exposure control  IMAGE QUALITY: Diagnostic  FINDINGS:      FRONTAL SINUSES: Clear  ETHMOID AIR CELLS: Clear  MAXILLARY SINUSES: Clear  SPHENOID SINUSES: Clear  NASAL SEPTUM AND CAVITY:     Nasal septal spur to the right  Septal deviation to the left  ANTERIOR OSTEOMEATAL COMPLEX: Patent  OSSEOUS STRUCTURES: No bony erosion or destruction  Normal cribiform plate and Planum Spendoidale  Visulaized mastoid temporal bones are clear  The bony walls of the carotid canals are intact       SOFT TISSUES: Mild inferior turbinate hypertrophy bilaterally     Mild degenerative changes left temporomandibular joint       IMPRESSION:   Nasal septal deviation     Mild nasal turbinate hypertrophy     Mild degenerative arthritis left TMJ     Patent ostiomeatal complexes and clear paranasal sinuses       Workstation performed: [id number]     Signed by:   Cleve Haley MD   1/22/18

## 2018-02-02 PROBLEM — R51.9 HEADACHE: Status: ACTIVE | Noted: 2017-06-24

## 2018-02-02 PROBLEM — H10.10 ALLERGIC CONJUNCTIVITIS: Status: ACTIVE | Noted: 2017-06-20

## 2018-02-02 PROBLEM — J32.9 CHRONIC SINUSITIS: Status: ACTIVE | Noted: 2017-06-26

## 2018-02-02 PROBLEM — J01.01 RECURRENT MAXILLARY SINUSITIS: Status: ACTIVE | Noted: 2018-01-09

## 2018-02-02 PROBLEM — M79.10 MUSCLE PAIN: Status: ACTIVE | Noted: 2017-06-20

## 2018-02-02 PROBLEM — R68.89 ABNORMAL ANKLE BRACHIAL INDEX: Status: ACTIVE | Noted: 2017-08-29

## 2018-02-02 PROBLEM — H81.11 BENIGN PAROXYSMAL POSITIONAL VERTIGO OF RIGHT EAR: Status: ACTIVE | Noted: 2017-03-23

## 2018-02-02 PROBLEM — R00.2 PALPITATIONS: Status: ACTIVE | Noted: 2017-08-08

## 2018-02-03 ENCOUNTER — OFFICE VISIT (OUTPATIENT)
Dept: URGENT CARE | Facility: CLINIC | Age: 38
End: 2018-02-03
Payer: COMMERCIAL

## 2018-02-03 VITALS
HEART RATE: 74 BPM | WEIGHT: 132 LBS | BODY MASS INDEX: 24.29 KG/M2 | DIASTOLIC BLOOD PRESSURE: 72 MMHG | OXYGEN SATURATION: 98 % | SYSTOLIC BLOOD PRESSURE: 116 MMHG | HEIGHT: 62 IN | RESPIRATION RATE: 16 BRPM | TEMPERATURE: 99.3 F

## 2018-02-03 DIAGNOSIS — N12 PYELONEPHRITIS: ICD-10-CM

## 2018-02-03 DIAGNOSIS — N39.0 UTI (URINARY TRACT INFECTION), UNCOMPLICATED: ICD-10-CM

## 2018-02-03 DIAGNOSIS — R35.0 URINE FREQUENCY: ICD-10-CM

## 2018-02-03 DIAGNOSIS — N30.00 ACUTE CYSTITIS WITHOUT HEMATURIA: Primary | ICD-10-CM

## 2018-02-03 LAB
SL AMB  POCT GLUCOSE, UA: NORMAL
SL AMB LEUKOCYTE ESTERASE,UA: NORMAL
SL AMB POCT BILIRUBIN,UA: NORMAL
SL AMB POCT BLOOD,UA: NORMAL
SL AMB POCT CLARITY,UA: NORMAL
SL AMB POCT COLOR,UA: YELLOW
SL AMB POCT KETONES,UA: NORMAL
SL AMB POCT NITRITE,UA: NORMAL
SL AMB POCT PH,UA: 6.5
SL AMB POCT SPECIFIC GRAVITY,UA: 1.01

## 2018-02-03 PROCEDURE — 87086 URINE CULTURE/COLONY COUNT: CPT | Performed by: NURSE PRACTITIONER

## 2018-02-03 PROCEDURE — 87077 CULTURE AEROBIC IDENTIFY: CPT | Performed by: NURSE PRACTITIONER

## 2018-02-03 PROCEDURE — 99213 OFFICE O/P EST LOW 20 MIN: CPT | Performed by: NURSE PRACTITIONER

## 2018-02-03 RX ORDER — CIPROFLOXACIN 500 MG/1
500 TABLET, FILM COATED ORAL EVERY 12 HOURS SCHEDULED
Qty: 20 TABLET | Refills: 0 | Status: SHIPPED | OUTPATIENT
Start: 2018-02-03 | End: 2018-02-03

## 2018-02-03 NOTE — PROGRESS NOTES
Patient called because her pharmacist told her that the Cipro she got at the urgent care center could react with the sotalol she is on causing a problem  Patient called to see if this was true  I looked this up  And found that it can cause a dysrhythmia  Plan:   Patient is directed not to take the Cipro  Patient will get Uro statin use a throughout the weekend  Patient will call either her primary care or 30 center on Monday for the culture results  Patient will call p buddy oseguera

## 2018-02-03 NOTE — PROGRESS NOTES
Assessment/Plan:    No problem-specific Assessment & Plan notes found for this encounter  Diagnoses and all orders for this visit:    Acute cystitis without hematuria  -     UA (URINE) with reflex to Microscopic    Urine frequency  -     POCT urine dip    Pyelonephritis  -     ciprofloxacin (CIPRO) 500 mg tablet; Take 1 tablet (500 mg total) by mouth every 12 (twelve) hours for 10 days    UTI (urinary tract infection), uncomplicated          Subjective:      Patient ID: Yajaira Valenzuela is a 30 y o  female  Started with suprapubic pressure started 3 weeks ago and she thought it was period related  Period finished 2 weeks ago and dull pressure left suprapubic area continued and then this week pain began to radiate to left lower back  Took 4 ibuprofen yesterday and it did not help so she came in to be checked  Urine dip + uti  + left cva tenderness  Pt is on sotalol but has taken cipro in the past for uti sometime in the past year  The following portions of the patient's history were reviewed and updated as appropriate: allergies, current medications, past family history, past medical history, past social history, past surgical history and problem list     Review of Systems   Constitutional: Negative  Negative for activity change, appetite change, chills, diaphoresis, fatigue and fever  HENT: Negative  Negative for congestion  Eyes: Negative  Negative for discharge and redness  Respiratory: Negative  Negative for shortness of breath and wheezing  Cardiovascular: Negative  Negative for chest pain  Gastrointestinal: Negative  Negative for abdominal pain  Endocrine: Negative  Genitourinary: Positive for dysuria, flank pain, frequency and urgency  +left suprpubic pain/dullness with left cva tenderness   Musculoskeletal: Negative for gait problem  Skin: Negative  Negative for color change  Neurological: Negative  Negative for weakness     Psychiatric/Behavioral: Negative  Negative for agitation  Objective:     Physical Exam   Constitutional: She is oriented to person, place, and time  She appears well-developed and well-nourished  No distress  HENT:   Head: Normocephalic and atraumatic  Left Ear: External ear normal    Nose: Nose normal    Mouth/Throat: Oropharynx is clear and moist    Eyes: Conjunctivae and EOM are normal    Neck: Normal range of motion  Neck supple  Pulmonary/Chest: Effort normal    Abdominal: Soft  Bowel sounds are normal  She exhibits no distension and no mass  There is tenderness  There is no rebound and no guarding  Left lower suprapubic pain with left cva tenderness   Genitourinary: No vaginal discharge found  Musculoskeletal: Normal range of motion  Neurological: She is alert and oriented to person, place, and time  Skin: Skin is warm and dry  Psychiatric: She has a normal mood and affect

## 2018-02-04 RX ORDER — SULFAMETHOXAZOLE AND TRIMETHOPRIM 800; 160 MG/1; MG/1
1 TABLET ORAL EVERY 12 HOURS SCHEDULED
Qty: 28 TABLET | Refills: 0 | Status: SHIPPED | OUTPATIENT
Start: 2018-02-04 | End: 2019-01-08 | Stop reason: SDUPTHER

## 2018-02-06 LAB — BACTERIA UR CULT: ABNORMAL

## 2018-04-03 ENCOUNTER — OFFICE VISIT (OUTPATIENT)
Dept: FAMILY MEDICINE CLINIC | Facility: CLINIC | Age: 38
End: 2018-04-03
Payer: COMMERCIAL

## 2018-04-03 VITALS
TEMPERATURE: 98.6 F | WEIGHT: 130 LBS | HEART RATE: 84 BPM | DIASTOLIC BLOOD PRESSURE: 70 MMHG | HEIGHT: 62 IN | SYSTOLIC BLOOD PRESSURE: 110 MMHG | BODY MASS INDEX: 23.92 KG/M2 | RESPIRATION RATE: 14 BRPM

## 2018-04-03 DIAGNOSIS — L30.9 DERMATITIS: Primary | ICD-10-CM

## 2018-04-03 PROCEDURE — 99213 OFFICE O/P EST LOW 20 MIN: CPT | Performed by: FAMILY MEDICINE

## 2018-04-03 RX ORDER — KETOCONAZOLE 20 MG/G
CREAM TOPICAL DAILY
Qty: 60 G | Refills: 2 | Status: SHIPPED | OUTPATIENT
Start: 2018-04-03 | End: 2019-01-08 | Stop reason: ALTCHOICE

## 2018-04-03 NOTE — PATIENT INSTRUCTIONS
Make the appt with your dermatologist     Get the NPI number which they will need when you call back for the referral

## 2018-04-03 NOTE — PROGRESS NOTES
Assessment/Plan:    No problem-specific Assessment & Plan notes found for this encounter  Diagnoses and all orders for this visit:    Dermatitis  -     ketoconazole (NIZORAL) 2 % cream; Apply topically daily          I am going to try and treat this as if it were fungal with ketoconazole  I advised she get on his many times a day as she can remember, up to 3 to 4 times a day  This can take many weeks to months to resolve if it is fungal   If his not getting better I do encourage that she see Dermatology  She does have a dermatologist so she will call for the appointment as it can take many weeks to get in with them  Subjective:      Patient ID: Katherine Gandara is a 30 y o  female  The pt is here today with a spot on her left leg  She says it has been there since about October, so now many months  It started out small  It has gotten larger  She has tried her prescription eczema cream on it, a steroid cream, and has not helped at all  It does not really itch  Now she is starting to have some pain/discomfort when the area on the side of it is pushed  She has never had anything like this before  She does have eczema but this is treated with the eczema cream  She has a small spot on her right arm that appears similar but she thinks this 1 looks more like her regular eczema          The following portions of the patient's history were reviewed and updated as appropriate: allergies, current medications, past family history, past medical history, past social history, past surgical history and problem list     Review of Systems      Objective:  Vitals:    04/03/18 1434   BP: 110/70   Pulse: 84   Resp: 14   Temp: 98 6 °F (37 °C)   Weight: 59 kg (130 lb)   Height: 5' 2" (1 575 m)      Physical Exam   Constitutional: She is oriented to person, place, and time  She appears well-developed and well-nourished  Neurological: She is alert and oriented to person, place, and time     Skin:   There is a 2 cm erythematous papule with mild scale on the left lateral calf   Psychiatric: She has a normal mood and affect

## 2018-06-04 ENCOUNTER — OFFICE VISIT (OUTPATIENT)
Dept: FAMILY MEDICINE CLINIC | Facility: CLINIC | Age: 38
End: 2018-06-04
Payer: COMMERCIAL

## 2018-06-04 VITALS
TEMPERATURE: 100 F | WEIGHT: 128 LBS | HEIGHT: 63 IN | DIASTOLIC BLOOD PRESSURE: 66 MMHG | SYSTOLIC BLOOD PRESSURE: 112 MMHG | HEART RATE: 85 BPM | OXYGEN SATURATION: 97 % | BODY MASS INDEX: 22.68 KG/M2

## 2018-06-04 DIAGNOSIS — R10.9 ABDOMINAL PAIN, UNSPECIFIED ABDOMINAL LOCATION: ICD-10-CM

## 2018-06-04 DIAGNOSIS — R10.32 ABDOMINAL PAIN, LEFT LOWER QUADRANT: Primary | ICD-10-CM

## 2018-06-04 DIAGNOSIS — F41.9 ANXIETY: ICD-10-CM

## 2018-06-04 LAB
SL AMB  POCT GLUCOSE, UA: NEGATIVE
SL AMB LEUKOCYTE ESTERASE,UA: ABNORMAL
SL AMB POCT BILIRUBIN,UA: NEGATIVE
SL AMB POCT BLOOD,UA: ABNORMAL
SL AMB POCT CLARITY,UA: CLEAR
SL AMB POCT COLOR,UA: YELLOW
SL AMB POCT KETONES,UA: NEGATIVE
SL AMB POCT NITRITE,UA: NEGATIVE
SL AMB POCT PH,UA: 5
SL AMB POCT SPECIFIC GRAVITY,UA: 1.01
SL AMB POCT URINE PROTEIN: NEGATIVE
SL AMB POCT UROBILINOGEN: 0.2

## 2018-06-04 PROCEDURE — 81002 URINALYSIS NONAUTO W/O SCOPE: CPT | Performed by: FAMILY MEDICINE

## 2018-06-04 PROCEDURE — 99213 OFFICE O/P EST LOW 20 MIN: CPT | Performed by: FAMILY MEDICINE

## 2018-06-04 RX ORDER — MOMETASONE FUROATE 1 MG/G
CREAM TOPICAL
Refills: 2 | COMMUNITY
Start: 2018-04-17 | End: 2019-01-08 | Stop reason: ALTCHOICE

## 2018-06-04 NOTE — PATIENT INSTRUCTIONS
Call   [employer] [facility] about GI referral  Anxiety: primary care - citalopram, sertraline for anxiety

## 2018-06-04 NOTE — PROGRESS NOTES
Assessment/Plan:      Diagnoses and all orders for this visit:    Abdominal pain, left lower quadrant    Abdominal pain, unspecified abdominal location  -     POCT urine dip  -     Cancel: Urine culture; Future  -     Cancel: Urine culture  -     Urine culture; Future  -     Urine culture    Other orders  -     mometasone (ELOCON) 0 1 % cream; APPLY TO AFFECTED AREA(S) TWO TIMES A DAY FOR 2 WEEKS THEN STOP FOR 1 WEEK THEN REPEAT AS NEEDED FOR ECZEMA        Abdominal pain left lower quadrant:  Tenderness ongoing the negative CT scan, patient will schedule evaluation with GI and consult for colonoscopy preparation with cardiologist   She had trouble with preparation inducing tachycardia even with pacemaker  Rule out colitis  Call if blood in stools, increased pain, fever, nausea or vomiting  Pt was treated for urinary tract infection  Was able to leave urine sample in follow up, will send for culture, no suprapubic tenderness  Subjective:     Patient ID: Fina Xie is a 30 y o  female  Patient complains of ache over Left lower quadrant pain since January  Had small bowel obstruction with surgical repair  Started prep with colonoscopy and had fast heartbeat  So was unable to complete preparation and unable to get a colonoscopy  Patient had a colonoscopy that was negative  She said the most recent 1 at [facility] show some spots on her liver  She is following up with a liver specialist in August         Review of Systems   Constitutional: Negative  Negative for fatigue and fever  HENT: Negative  Eyes: Negative  Respiratory: Negative  Negative for cough  Cardiovascular: Negative  Gastrointestinal: Positive for abdominal pain and diarrhea  Negative for blood in stool, constipation and vomiting  Endocrine: Negative  Genitourinary: Negative for difficulty urinating, dyspareunia and dysuria  Musculoskeletal: Negative  Skin: Negative  Allergic/Immunologic: Negative  Neurological: Negative  Psychiatric/Behavioral: Negative  The following portions of the patient's history were reviewed and updated as appropriate: allergies, current medications, past family history, past medical history, past social history, past surgical history and problem list     Objective:  Vitals:    06/04/18 1704   BP: 112/66   Pulse: 85   Temp: 100 °F (37 8 °C)   SpO2: 97%   Weight: 58 1 kg (128 lb)   Height: 5' 3" (1 6 m)      Physical Exam   Constitutional: She is oriented to person, place, and time  She appears well-developed and well-nourished  HENT:   Head: Normocephalic and atraumatic  Cardiovascular: Normal rate and regular rhythm  Pulmonary/Chest: Effort normal and breath sounds normal    Abdominal: Soft  Bowel sounds are normal  There is tenderness  Mild tenderness left red abdomen   Neurological: She is alert and oriented to person, place, and time  Skin: Skin is warm and dry  Psychiatric: She has a normal mood and affect  Her behavior is normal  Judgment and thought content normal    Nursing note and vitals reviewed

## 2018-06-06 LAB
BACTERIA UR CULT: NORMAL
Lab: NO GROWTH

## 2018-07-16 ENCOUNTER — TELEPHONE (OUTPATIENT)
Dept: FAMILY MEDICINE CLINIC | Facility: CLINIC | Age: 38
End: 2018-07-16

## 2018-07-16 ENCOUNTER — OFFICE VISIT (OUTPATIENT)
Dept: FAMILY MEDICINE CLINIC | Facility: CLINIC | Age: 38
End: 2018-07-16
Payer: COMMERCIAL

## 2018-07-16 ENCOUNTER — HOSPITAL ENCOUNTER (OUTPATIENT)
Dept: NON INVASIVE DIAGNOSTICS | Facility: HOSPITAL | Age: 38
Discharge: HOME | End: 2018-07-16
Attending: FAMILY MEDICINE
Payer: COMMERCIAL

## 2018-07-16 VITALS
BODY MASS INDEX: 23.43 KG/M2 | OXYGEN SATURATION: 99 % | HEART RATE: 92 BPM | WEIGHT: 132.25 LBS | TEMPERATURE: 99.2 F | DIASTOLIC BLOOD PRESSURE: 60 MMHG | HEIGHT: 63 IN | SYSTOLIC BLOOD PRESSURE: 102 MMHG

## 2018-07-16 DIAGNOSIS — M79.89 PAIN AND SWELLING OF RIGHT LOWER EXTREMITY: ICD-10-CM

## 2018-07-16 DIAGNOSIS — M79.604 ACUTE PAIN OF RIGHT LOWER EXTREMITY: ICD-10-CM

## 2018-07-16 DIAGNOSIS — M79.604 PAIN AND SWELLING OF RIGHT LOWER EXTREMITY: ICD-10-CM

## 2018-07-16 DIAGNOSIS — Z87.74 HISTORY OF COMPLETE TRANSPOSITION OF GREAT VESSELS: ICD-10-CM

## 2018-07-16 DIAGNOSIS — M79.604 ACUTE PAIN OF RIGHT LOWER EXTREMITY: Primary | ICD-10-CM

## 2018-07-16 PROCEDURE — 93971 EXTREMITY STUDY: CPT

## 2018-07-16 PROCEDURE — 99214 OFFICE O/P EST MOD 30 MIN: CPT | Performed by: FAMILY MEDICINE

## 2018-07-16 PROCEDURE — 93971 EXTREMITY STUDY: CPT | Performed by: SURGERY

## 2018-07-16 PROCEDURE — 3008F BODY MASS INDEX DOCD: CPT | Performed by: FAMILY MEDICINE

## 2018-07-16 NOTE — PATIENT INSTRUCTIONS
Leg Sprain   WHAT YOU NEED TO KNOW:   A leg sprain is an injury that occurs when your ligaments are forced to stretch beyond their normal range  Ligaments are tough tissues that support joints, and connect and keep bones in place  Sprains mainly occur with twisting, falling, or blunt force injuries  Mild sprains may take up to 6 weeks to heal  Severe sprains can take up to 12 months to heal   DISCHARGE INSTRUCTIONS:   Return to the emergency department if:   · You have severe pain that does not go away  · Red streaks appear near your injury  · You have numbness or a loss of movement in your injured leg  Contact your healthcare provider if:   · Your pain or swelling worsens, or does not improve with treatment  · You have a fever, redness, swelling, or warmth around your injury  · You have questions or concerns about your condition or care  Medicines: You may need any of the following:  · Acetaminophen  decreases pain and fever  It is available without a doctor's order  Ask how much to take and how often to take it  Follow directions  Acetaminophen can cause liver damage if not taken correctly  · NSAIDs , such as ibuprofen, help decrease swelling, pain, and fever  This medicine is available with or without a doctor's order  NSAIDs can cause stomach bleeding or kidney problems in certain people  If you take blood thinner medicine, always ask if NSAIDs are safe for you  Always read the medicine label and follow directions  Do not give these medicines to children under 10months of age without direction from your child's healthcare provider  · Take your medicine as directed  Contact your healthcare provider if you think your medicine is not helping or if you have side effects  Tell him of her if you are allergic to any medicine  Keep a list of the medicines, vitamins, and herbs you take  Include the amounts, and when and why you take them  Bring the list or the pill bottles to follow-up visits  Carry your medicine list with you in case of an emergency  Self-care:   · Rest  your leg for up to 2 days to help it heal  Return to normal activities as directed  · Ice  your injured leg  This helps decrease swelling and pain, and may also help prevent tissue damage  Use an ice pack, or put crushed ice in a plastic bag  Cover the ice pack with a towel and place it on your injury for 20 to 30 minutes, up to 4 times daily  Use the ice for as long as directed  · Compress  your injured leg with an elastic bandage as directed  An elastic bandage provides support and helps decrease swelling and movement so your joint can heal  You may need a cast or splint if your injury is severe  · Elevate  your injured leg by lying down and resting it on pillows that are higher than your heart  This should be done as often as you can for at least 2 days to reduce swelling  Activity:  Ask your healthcare provider how much activity is right for you  Start activity slowly and stop if you feel pain  Your healthcare provider may suggest the following:  · Exercise  may help reduce stiffness in your leg  Your healthcare provider may show you certain exercises that you can do on your own  · Physical therapy  will teach you exercises to help improve movement and strength, and to decrease pain  Prevent another leg sprain:   · Warm up, cool down, and stretch before and after you exercise  This may help ease your body into activity, and prevent another injury  · Wear protective equipment for activities  This will prevent another injury  · Wear shoes that fit well  Replace your shoes when the tread or heels are worn down  · Do not exercise when you are tired or in pain  You are more likely to become injured if your body is not rested  · Make the places you walk safer  Keep your pathways clear of objects so you do not trip over them   Pour salt on driveways and walkways in the winter to help prevent you from slipping on ice  · Run and walk on flat surfaces  Bumpy or curvy paths put you at risk for another injury  Follow up with your healthcare provider as directed:  Write down your questions so you remember to ask them during your visits  © 2017 [employer] Information is for End User's use only and may not be sold, redistributed or otherwise used for commercial purposes  All illustrations and images included in CareNotes® are the copyrighted property of A D A M [employer] Inc  or [employer]  The above information is an  only  It is not intended as medical advice for individual conditions or treatments  Talk to your doctor, nurse or pharmacist before following any medical regimen to see if it is safe and effective for you

## 2018-07-16 NOTE — PROGRESS NOTES
Assessment/Plan:    No problem-specific Assessment & Plan notes found for this encounter  Diagnoses and all orders for this visit:    Acute pain of right lower extremity  -     VAS lower limb venous duplex study, unilateral/limited; Future    Pain and swelling of right lower extremity  -     VAS lower limb venous duplex study, unilateral/limited; Future    History of complete transposition of great vessels          Patient has a very significant cardiovascular history  Her symptoms of unilateral leg swelling and pain could certainly be indication of a DVT although she does not currently have any history of recent surgery or recent immobilization to suggest this  I would like to get a stat ultrasound of the leg to be sure this is not a DVT, though  If it is not that may very well simply be muscular in nature  Additionally, there is always the possibility that this could be arterial in nature so if the Doppler is negative I may very will order an ANNE MARIE  She has had an abnormal ANNE MARIE in the past according to her problem list     Subjective:   Chief Complaint   Patient presents with    Pain     pt noticed yesterday that her rt calf and ankle was swollen, pt noticed a sharp stabbing pain in her up rt leg groin area on Friday        Patient ID: Penny Caro is a 30 y o  female      Felt like someone was stabbing her in front of the groin area on the right  This woke her from sleep Friday morning  Was coming and going until she got out of bed and then resolved  All was fine until yesterday when she noticed pain and swelling on the back of the right calf  Went to put on capris and they were really tight on the right side  She also had some ankle swelling  Now she has pain from her knee down her leg into her foot  Driving is the worst  Noticed her foot was really cold yesterday as well    Has had lots of cardiac cath's in that leg  They can't even use that leg anymore    She does take a daily baby aspirin    At 1 point, she was having trouble with tachycardia  She had seen her pacemaker Dr  and they had discussed this  It was during travel and she and her doctor felt that she was probably dehydrated which is what caused this  I had not recurred until recently  She did have a very fast heart rate on Thursday  Resolved after 15 minutes on its own    She denies chest pain or shortness of breath  She denies nausea vomiting  She denies fevers or chills          The following portions of the patient's history were reviewed and updated as appropriate: allergies, current medications, past family history, past medical history, past social history, past surgical history and problem list     Review of Systems      Objective:  Vitals:    07/16/18 0930   BP: 102/60   Pulse: 92   Temp: 99 2 °F (37 3 °C)   SpO2: 99%   Weight: 60 kg (132 lb 4 oz)   Height: 5' 3" (1 6 m)      Physical Exam   Constitutional: She appears well-developed and well-nourished  No distress  Musculoskeletal: Normal range of motion  She exhibits tenderness (Posterior right calf)  She exhibits no edema or deformity  Skin: Skin is warm and dry  No rash noted  She is not diaphoretic  No erythema  Psychiatric: She has a normal mood and affect   Her behavior is normal  Judgment and thought content normal

## 2018-09-17 ENCOUNTER — HOSPITAL ENCOUNTER (OUTPATIENT)
Dept: NON INVASIVE DIAGNOSTICS | Facility: HOSPITAL | Age: 38
Discharge: HOME | End: 2018-09-17
Payer: COMMERCIAL

## 2018-09-17 ENCOUNTER — TRANSCRIBE ORDERS (OUTPATIENT)
Dept: ADMINISTRATIVE | Facility: HOSPITAL | Age: 38
End: 2018-09-17

## 2018-09-17 DIAGNOSIS — Z79.899 MEDICATION COURSE CHANGED: Primary | ICD-10-CM

## 2018-09-17 DIAGNOSIS — Z79.899 MEDICATION COURSE CHANGED: ICD-10-CM

## 2018-09-17 PROCEDURE — 93005 ELECTROCARDIOGRAM TRACING: CPT

## 2018-09-18 LAB
ATRIAL RATE: 89 BPM
P AXIS: 31 DEGREES
PR INTERVAL: 164 MS
QRS AXIS: -89 DEGREES
QRSD INTERVAL: 32 MS
QT INTERVAL: 350 MS
QTC INTERVAL: 425 MS
T WAVE AXIS: 137 DEGREES
VENTRICULAR RATE: 89 BPM

## 2018-09-18 PROCEDURE — 93010 ELECTROCARDIOGRAM REPORT: CPT | Performed by: INTERNAL MEDICINE

## 2018-10-09 ENCOUNTER — TRANSCRIBE ORDERS (OUTPATIENT)
Dept: ADMINISTRATIVE | Facility: HOSPITAL | Age: 38
End: 2018-10-09

## 2018-10-09 DIAGNOSIS — Q24.9 CONGENITAL HEART DISEASE: Primary | ICD-10-CM

## 2018-10-11 NOTE — NURSING NOTE
Cardiac CTA Questionairre     Cardiac history    Reason for exam:  Congenital heart disease, worse stress test 2018    Have you been diagnosed with heart disease? Ventricular hypertrophy            5/18/Transposition Great arteries s/p BAS 3wks/ Mustard atrial switch 11mo,baffle       revision 1999    Do you have a family history of heart disease? No    Have you ever experienced chest pain? Yes with exertion    Have you had a CABG, angioplasty, cardiac cath, stent placement? no    Do you have a pacemaker or defibrillator? 1999/2005 Medtronic Dual Chamber AAIR             Rate 70, Mode switch detect rate 180 9/18 interrogated 100% A paced 70's    Have you ever been diagnosed with an irregular heart beat? Paced, AT on interrogation    Do you have a history of having COPD or asthma? No    Do you have high blood pressure? No    Do you have diabetes? No    Do you have high cholesterol? No    Do you smoke? No, never      General Information    Do you have an allergy to intravenous contrast or dye? No    Do you have kidney disease? No    Height:      5'2"                         Weight:   134      Because we give a nitrate during testing, called nitroglycerin, we need to   remind you: If you take any male enhancement medications such as Viagra, Levitra or Cialis; you will need to hold these medications for 3 days prior to testing and may resume these medications 24 hours after your procedure to prevent a  life threatening drop in your blood pressure  (Women may take for pulmonary hypertension)    Patient verbalizes understanding of the above warning/information: Pt does not take PDE5 inhibitor    Instructions:           Nothing to eat  for 4 hours prior to testing, you may drink  Increase fluids 1 day prior to exam unless contraindicated  No caffeine for 12 hours prior to testing  This includes caffeine free beverages and any tea beverages, or energy drinks      If applicable, Hold PDE5 for 3 days prior to test, resume 24 hrs after test      If applicable, take beta blockade medication 1 hr prior to exam as instructed by physician  Take any medication as prescribed by your doctor before this procedure unless directed otherwise  Testing time is approximately 30 - 45 minutes  Bring picture ID and insurance information; if you have either of these  Completed CTA questionnaire 10/9/18 via phone  Procedure explained to patient, all questions answered  Above instructions given to patient  Received faxed order for labs from Dr Boston's office at [address] [facility],[address], pt will  script tomorrow, complete prior to test  Call placed to EPS Dr Dulce Biggs office to see if additional beta blockade appropriate considering pt has pacer rate 70, with episodes AT on interrogation  Awaiting call back from office  Patient verbalizes understanding of procedure education and instructions given

## 2018-10-17 ENCOUNTER — TELEPHONE (OUTPATIENT)
Dept: FAMILY MEDICINE CLINIC | Facility: CLINIC | Age: 38
End: 2018-10-17

## 2018-10-18 ENCOUNTER — HOSPITAL ENCOUNTER (OUTPATIENT)
Dept: CT IMAGING | Facility: HOSPITAL | Age: 38
Discharge: HOME | End: 2018-10-18

## 2018-10-31 ENCOUNTER — OFFICE VISIT (OUTPATIENT)
Dept: FAMILY MEDICINE CLINIC | Facility: CLINIC | Age: 38
End: 2018-10-31
Payer: COMMERCIAL

## 2018-10-31 VITALS
OXYGEN SATURATION: 96 % | HEART RATE: 73 BPM | HEIGHT: 63 IN | SYSTOLIC BLOOD PRESSURE: 80 MMHG | TEMPERATURE: 99.9 F | RESPIRATION RATE: 16 BRPM | BODY MASS INDEX: 23.88 KG/M2 | DIASTOLIC BLOOD PRESSURE: 55 MMHG | WEIGHT: 134.8 LBS

## 2018-10-31 DIAGNOSIS — J01.01 RECURRENT MAXILLARY SINUSITIS: Primary | ICD-10-CM

## 2018-10-31 DIAGNOSIS — Z23 NEED FOR VACCINATION: ICD-10-CM

## 2018-10-31 DIAGNOSIS — H65.01 RIGHT ACUTE SEROUS OTITIS MEDIA, RECURRENCE NOT SPECIFIED: ICD-10-CM

## 2018-10-31 PROBLEM — Z95.0 PACEMAKER: Status: ACTIVE | Noted: 2018-09-04

## 2018-10-31 PROBLEM — Q24.9 CONGENITAL HEART DISEASE: Status: ACTIVE | Noted: 2018-09-04

## 2018-10-31 PROBLEM — Z80.41 FAMILY HISTORY OF OVARIAN CANCER: Status: ACTIVE | Noted: 2018-10-30

## 2018-10-31 PROCEDURE — 99213 OFFICE O/P EST LOW 20 MIN: CPT | Performed by: FAMILY MEDICINE

## 2018-10-31 PROCEDURE — 3008F BODY MASS INDEX DOCD: CPT | Performed by: FAMILY MEDICINE

## 2018-10-31 PROCEDURE — 1036F TOBACCO NON-USER: CPT | Performed by: FAMILY MEDICINE

## 2018-10-31 RX ORDER — DESOGESTREL AND ETHINYL ESTRADIOL 0.15-0.03
1 KIT ORAL DAILY
COMMUNITY
Start: 2018-09-04 | End: 2019-02-27 | Stop reason: SDUPTHER

## 2018-10-31 RX ORDER — CEFUROXIME AXETIL 250 MG/1
250 TABLET ORAL EVERY 12 HOURS SCHEDULED
Qty: 20 TABLET | Refills: 0 | Status: SHIPPED | OUTPATIENT
Start: 2018-10-31 | End: 2018-11-10

## 2018-10-31 RX ORDER — OLOPATADINE HYDROCHLORIDE 1 MG/ML
1 SOLUTION/ DROPS OPHTHALMIC
COMMUNITY
Start: 2017-06-20 | End: 2018-10-31 | Stop reason: SDUPTHER

## 2018-10-31 RX ORDER — SOTALOL HYDROCHLORIDE 120 MG/1
1 TABLET ORAL 2 TIMES DAILY
COMMUNITY
Start: 2018-10-13

## 2018-10-31 NOTE — PROGRESS NOTES
Subjective:   Chief Complaint   Patient presents with    Sinusitis     Patient presents for sinusitis and chest congestion x 14 days  Patient will postpone flu shot until feeling better  Patient ID: Yajaira Valenzuela is a 30 y o  female  Symptoms for the past 2 weeks  Nasal drainage  With some dry nose  Feels feverish  Cough-primarily dry with tickle in her throat        The following portions of the patient's history were reviewed and updated as appropriate: allergies, current medications, past family history, past medical history, past social history, past surgical history and problem list     Review of Systems   Constitutional: Positive for fatigue  Negative for fever  HENT: Positive for congestion, ear pain, postnasal drip, sinus pressure and sore throat  Right ear pain   Eyes: Negative  Respiratory: Positive for cough  Negative for wheezing  Cardiovascular: Negative  Gastrointestinal: Negative  Endocrine: Negative  Genitourinary: Negative  Musculoskeletal: Negative  Skin: Negative  Allergic/Immunologic: Negative  Neurological: Negative  Psychiatric/Behavioral: Negative  Objective:  Vitals:    10/31/18 0917 10/31/18 0933   BP:  (!) 80/55   Pulse: 73    Resp: 16    Temp: 99 9 °F (37 7 °C)    SpO2: 96%    Weight: 61 1 kg (134 lb 12 8 oz)    Height: 5' 3" (1 6 m)       Physical Exam   Constitutional: She is oriented to person, place, and time  She appears well-developed and well-nourished  HENT:   Head: Normocephalic and atraumatic  Left Ear: External ear normal    Mouth/Throat: Oropharynx is clear and moist    Nasal congestion with erythema, deviated septum  Right TM dull without erythema   Eyes: Pupils are equal, round, and reactive to light  Conjunctivae and EOM are normal    Neck: Normal range of motion  Neck supple  Cardiovascular: Normal rate, regular rhythm, normal heart sounds and intact distal pulses      Pulmonary/Chest: Effort normal and breath sounds normal    Abdominal: Soft  Bowel sounds are normal    Musculoskeletal: Normal range of motion  Neurological: She is alert and oriented to person, place, and time  Skin: Skin is warm and dry  Psychiatric: She has a normal mood and affect  Her behavior is normal  Judgment and thought content normal    Nursing note and vitals reviewed  Assessment/Plan:    No problem-specific Assessment & Plan notes found for this encounter  Diagnoses and all orders for this visit:    Recurrent maxillary sinusitis:  Patient will start cefuroxime 1 tablet twice a day  Observe for improvement until complete resolution  -     cefuroxime (CEFTIN) 250 mg tablet; Take 1 tablet (250 mg total) by mouth every 12 (twelve) hours for 10 days    Right acute serous otitis media, recurrence not specified:  Patient will also observe for improvement in right ear pain with cefuroxime    Need for vaccination  -     Cancel: influenza vaccine, 5131-3184, quadrivalent, recombinant, PF, 0 5 mL, for patients 18-49 yr with comorbidities (FLUBLOK)  Keep follow-up with Cardiology  Other orders  -     sotalol (BETAPACE) 120 mg tablet; Take 1 tablet by mouth 2 (two) times a day  -     desogestrel-ethinyl estradiol (APRI) 0 15-30 MG-MCG per tablet; Take 1 tablet by mouth daily  -     Discontinue: olopatadine (PATANOL) 0 1 % ophthalmic solution; Apply 1 drop to eye  -     ascorbic acid (VITAMIN C) 500 MG TBCR;  Take by mouth

## 2018-11-23 ENCOUNTER — DOCUMENTATION (OUTPATIENT)
Dept: FAMILY MEDICINE CLINIC | Facility: CLINIC | Age: 38
End: 2018-11-23

## 2018-12-18 ENCOUNTER — TELEPHONE (OUTPATIENT)
Dept: FAMILY MEDICINE CLINIC | Facility: CLINIC | Age: 38
End: 2018-12-18

## 2018-12-18 NOTE — TELEPHONE ENCOUNTER
Insurance referral for R19 [id number], NPI [id number] approved 12/18/2018  Date of service 12/19/2018  [id number]

## 2019-01-08 ENCOUNTER — OFFICE VISIT (OUTPATIENT)
Dept: FAMILY MEDICINE CLINIC | Facility: CLINIC | Age: 39
End: 2019-01-08
Payer: COMMERCIAL

## 2019-01-08 VITALS
WEIGHT: 137.5 LBS | TEMPERATURE: 99.1 F | DIASTOLIC BLOOD PRESSURE: 60 MMHG | OXYGEN SATURATION: 97 % | SYSTOLIC BLOOD PRESSURE: 98 MMHG | HEIGHT: 63 IN | HEART RATE: 74 BPM | BODY MASS INDEX: 24.36 KG/M2

## 2019-01-08 DIAGNOSIS — R10.2 PELVIC PAIN IN FEMALE: Primary | ICD-10-CM

## 2019-01-08 DIAGNOSIS — K59.09 OTHER CONSTIPATION: ICD-10-CM

## 2019-01-08 DIAGNOSIS — N30.00 ACUTE CYSTITIS WITHOUT HEMATURIA: ICD-10-CM

## 2019-01-08 DIAGNOSIS — R39.89 SUSPECTED UTI: ICD-10-CM

## 2019-01-08 LAB
SL AMB  POCT GLUCOSE, UA: ABNORMAL
SL AMB LEUKOCYTE ESTERASE,UA: ABNORMAL
SL AMB POCT BILIRUBIN,UA: ABNORMAL
SL AMB POCT BLOOD,UA: ABNORMAL
SL AMB POCT CLARITY,UA: ABNORMAL
SL AMB POCT COLOR,UA: YELLOW
SL AMB POCT KETONES,UA: ABNORMAL
SL AMB POCT NITRITE,UA: ABNORMAL
SL AMB POCT PH,UA: 6
SL AMB POCT SPECIFIC GRAVITY,UA: 1
SL AMB POCT URINE PROTEIN: ABNORMAL
SL AMB POCT UROBILINOGEN: 0.2

## 2019-01-08 PROCEDURE — 81002 URINALYSIS NONAUTO W/O SCOPE: CPT | Performed by: FAMILY MEDICINE

## 2019-01-08 PROCEDURE — 99214 OFFICE O/P EST MOD 30 MIN: CPT | Performed by: FAMILY MEDICINE

## 2019-01-08 RX ORDER — SULFAMETHOXAZOLE AND TRIMETHOPRIM 800; 160 MG/1; MG/1
1 TABLET ORAL EVERY 12 HOURS SCHEDULED
Qty: 14 TABLET | Refills: 0 | Status: SHIPPED | OUTPATIENT
Start: 2019-01-08 | End: 2019-01-15

## 2019-01-08 NOTE — PROGRESS NOTES
Subjective:   Chief Complaint   Patient presents with    Abdominal Pain     pt c/o pain on the left side of her adb area, under her pacemeker, pt states the pain started 2 weeks ago on & off and now it is a constant pain that starts on the left side of her adb area that go around to her lower to mid back area        Patient ID: Gabby Garcia is a 41 y o  female  Symptoms for 2 weeks soreness just under pacemaker site on left lower abdomen  Feels feverish overnight  On birth control, and last menses 12/20   Has more constipation, used miralax and apple juice, last BM was 2 days ago  No fevers during the day, trouble urinating, trouble starting stream but denies burning with urination  She denies any blood in her urine  The following portions of the patient's history were reviewed and updated as appropriate: allergies, current medications, past family history, past medical history, past social history, past surgical history and problem list     Review of Systems   Constitutional: Negative  Negative for fatigue and fever  HENT: Negative  Eyes: Negative  Respiratory: Negative  Negative for cough  Cardiovascular: Negative  Gastrointestinal: Positive for constipation and nausea  Negative for abdominal distention, anal bleeding, blood in stool and rectal pain  Endocrine: Negative  Genitourinary: Positive for decreased urine volume and pelvic pain  Musculoskeletal: Negative  Skin: Negative  Allergic/Immunologic: Negative  Neurological: Negative  Psychiatric/Behavioral: Negative  Objective:  Vitals:    01/08/19 0946   BP: 98/60   Pulse: 74   Temp: 99 1 °F (37 3 °C)   SpO2: 97%   Weight: 62 4 kg (137 lb 8 oz)   Height: 5' 3" (1 6 m)      Physical Exam   Constitutional: She is oriented to person, place, and time  She appears well-developed and well-nourished  HENT:   Head: Normocephalic and atraumatic     Cardiovascular: Normal rate, regular rhythm and normal heart sounds  Pulmonary/Chest: Effort normal and breath sounds normal    Abdominal: Soft  Bowel sounds are normal    Left-sided pelvic pain and suprapubic pain   Neurological: She is alert and oriented to person, place, and time  Skin: Skin is warm and dry  Site of pacemaker without erythema or tenderness to palpation, left-sided mid abdomen   Psychiatric: She has a normal mood and affect  Her behavior is normal  Judgment and thought content normal    Nursing note and vitals reviewed  Assessment/Plan:    No problem-specific Assessment & Plan notes found for this encounter  Diagnoses and all orders for this visit:    Pelvic pain in female  Comments:  left and suprapubic, sending urine for culture, start bactrim, observe for improvement, schedule pelvic ultrasound     Other constipation  Comments:  regulate bowels with miralax and apple juice     Suspected UTI  Comments:  urine dip positive for leukocytes and blood, sending for culture, start bactrim   Orders:  -     POCT urine dip  -     Urine culture    Acute cystitis without hematuria  -     sulfamethoxazole-trimethoprim (BACTRIM DS) 800-160 mg per tablet;  Take 1 tablet by mouth every 12 (twelve) hours for 7 days

## 2019-01-08 NOTE — PATIENT INSTRUCTIONS
Ct heart requested by cardiologist - study not done at [facility], awaiting approval for study   Next appt with Dr Eller Krabbe May 24 2019, cardiology  appt with liver specialist in February -for follow up abnormal ct liver   Pacemaker evaluation March in [place]   Schedule pelvic ultrasound     Urine sample in office today- positive, will send for culture   Constipation: miralax 1 cap in 8 ounces of water, with apple juice, take second cap this evening if no results   Start bactrim 1 tab twice a day   We will call with urine culture   Call sooner if fever, blood in urine, increased abdominal pain

## 2019-01-08 NOTE — PROGRESS NOTES
Subjective:   No chief complaint on file  Patient ID: Renate Mares is a 41 y o  female  Patient complains of left-sided pelvic pain for about 2 weeks  The pain was intermittent and now it is constant  She is on birth control pill  Her last menstrual cycle was 12/ 20  She denies any chest pains or shortness of breath  She denies a fever  She has been constipated  She denies any blood in the stool  Her last bowel movement was 2 days ago  She used MiraLax and apple juice 2 days ago-with some diarrhea  No blood in the stool  She was referred to a specialist due to spots on her liver per patient  The follow-up is scheduled for next month  The following portions of the patient's history were reviewed and updated as appropriate: allergies, current medications, past family history, past medical history, past social history, past surgical history and problem list     Review of Systems   Constitutional: Negative  Negative for fatigue and fever  HENT: Negative  Eyes: Negative  Respiratory: Negative  Negative for cough  Cardiovascular: Negative  Gastrointestinal: Positive for constipation and nausea  Negative for abdominal distention, anal bleeding, blood in stool, rectal pain and vomiting  Endocrine: Negative  Genitourinary: Positive for pelvic pain  Negative for frequency and hematuria  Musculoskeletal: Negative  Skin: Negative  Allergic/Immunologic: Negative  Neurological: Negative  Psychiatric/Behavioral: Negative  Objective: There were no vitals filed for this visit  Physical Exam      Assessment/Plan:    No problem-specific Assessment & Plan notes found for this encounter         Diagnoses and all orders for this visit:    Pelvic pain in female  -     US pelvis complete w transvaginal; Future

## 2019-01-10 LAB
BACTERIA UR CULT: NORMAL
Lab: NO GROWTH

## 2019-01-11 ENCOUNTER — TELEPHONE (OUTPATIENT)
Dept: FAMILY MEDICINE CLINIC | Facility: CLINIC | Age: 39
End: 2019-01-11

## 2019-01-12 ENCOUNTER — HOSPITAL ENCOUNTER (OUTPATIENT)
Dept: ULTRASOUND IMAGING | Facility: HOSPITAL | Age: 39
Discharge: HOME | End: 2019-01-12

## 2019-01-12 DIAGNOSIS — R10.2 PELVIC PAIN IN FEMALE: ICD-10-CM

## 2019-02-27 ENCOUNTER — TELEPHONE (OUTPATIENT)
Dept: FAMILY MEDICINE CLINIC | Facility: CLINIC | Age: 39
End: 2019-02-27

## 2019-02-27 ENCOUNTER — OFFICE VISIT (OUTPATIENT)
Dept: FAMILY MEDICINE CLINIC | Facility: CLINIC | Age: 39
End: 2019-02-27
Payer: COMMERCIAL

## 2019-02-27 VITALS
TEMPERATURE: 99.5 F | OXYGEN SATURATION: 98 % | WEIGHT: 133.25 LBS | HEIGHT: 63 IN | HEART RATE: 71 BPM | BODY MASS INDEX: 23.61 KG/M2 | DIASTOLIC BLOOD PRESSURE: 76 MMHG | SYSTOLIC BLOOD PRESSURE: 110 MMHG

## 2019-02-27 DIAGNOSIS — R19.7 DIARRHEA OF PRESUMED INFECTIOUS ORIGIN: ICD-10-CM

## 2019-02-27 DIAGNOSIS — R10.33 PERIUMBILICAL ABDOMINAL PAIN: Primary | ICD-10-CM

## 2019-02-27 DIAGNOSIS — R10.9 ABDOMINAL CRAMPING: ICD-10-CM

## 2019-02-27 PROBLEM — R39.89 SUSPECTED UTI: Status: RESOLVED | Noted: 2019-01-08 | Resolved: 2019-02-27

## 2019-02-27 PROCEDURE — 99214 OFFICE O/P EST MOD 30 MIN: CPT | Performed by: FAMILY MEDICINE

## 2019-02-27 PROCEDURE — 3008F BODY MASS INDEX DOCD: CPT | Performed by: FAMILY MEDICINE

## 2019-02-27 PROCEDURE — 1036F TOBACCO NON-USER: CPT | Performed by: FAMILY MEDICINE

## 2019-02-27 RX ORDER — LEVONORGESTREL AND ETHINYL ESTRADIOL 0.1-0.02MG
1 KIT ORAL DAILY
COMMUNITY
End: 2019-02-27 | Stop reason: ALTCHOICE

## 2019-02-27 RX ORDER — FLUTICASONE PROPIONATE 50 MCG
2 SPRAY, SUSPENSION (ML) NASAL DAILY
COMMUNITY
Start: 2014-07-28 | End: 2022-04-05 | Stop reason: ALTCHOICE

## 2019-02-27 RX ORDER — OLOPATADINE HYDROCHLORIDE 1 MG/ML
1 SOLUTION/ DROPS OPHTHALMIC DAILY
COMMUNITY
Start: 2017-06-20 | End: 2020-05-11 | Stop reason: SDUPTHER

## 2019-02-27 RX ORDER — DESOGESTREL AND ETHINYL ESTRADIOL 0.15-0.03
1 KIT ORAL DAILY
COMMUNITY
Start: 2018-09-04

## 2019-02-27 NOTE — PROGRESS NOTES
Subjective:   Chief Complaint   Patient presents with    Abdominal Pain     Pt started with stomach pain last Thursday evening  She thought she was going to be sick, but did not get sick on Thursday  On Friday, she stayed home from work due to stomach pain and body aches  On Saturday night and most of Sunday, she developed diarrhea and was unable to keep anything in  As of today, she has stomach pain and does not have much of an appetite  She has had a fever of 101 and 99  Took advil on Friday/Saturday for her 101 fever  Depression screening complete  No flu shot this season  Patient ID: Aniket Jain is a 41 y o  female  Complains of mid abdominal pain  Started with pain on Thursday, 6 days ago  Felt ok on Saturday, pain was better but not resolved, ate something but symptoms worse on Sunday Saturday loose bms, no blood  Feels hungry but gets nauseated  No vomiting  Pain worse when riding in car  History of small bowel obstruction in 2012  The following portions of the patient's history were reviewed and updated as appropriate: allergies, current medications, past family history, past medical history, past social history, past surgical history and problem list     Review of Systems   Constitutional: Negative  Negative for fatigue and fever  HENT: Negative  Eyes: Negative  Respiratory: Negative  Negative for cough  Cardiovascular: Negative  Gastrointestinal: Positive for abdominal pain, diarrhea and nausea  Negative for vomiting  Endocrine: Negative  Genitourinary: Negative  Musculoskeletal: Negative  Skin: Negative  Allergic/Immunologic: Negative  Neurological: Negative  Psychiatric/Behavioral: Negative  Objective:  Vitals:    02/27/19 0902   BP: 110/76   Pulse: 71   Temp: 99 5 °F (37 5 °C)   SpO2: 98%   Weight: 60 4 kg (133 lb 4 oz)   Height: 5' 3" (1 6 m)      Physical Exam   Constitutional: She is oriented to person, place, and time   She appears well-developed and well-nourished  HENT:   Head: Normocephalic and atraumatic  Cardiovascular: Normal rate, regular rhythm and normal heart sounds  Pulmonary/Chest: Effort normal and breath sounds normal    Abdominal: Soft  Bowel sounds are normal  There is tenderness in the periumbilical area  Neurological: She is alert and oriented to person, place, and time  Skin: Skin is warm and dry  Psychiatric: She has a normal mood and affect  Her behavior is normal  Judgment and thought content normal    Nursing note and vitals reviewed  Assessment/Plan:    No problem-specific Assessment & Plan notes found for this encounter  Diagnoses and all orders for this visit:    Periumbilical abdominal pain  Comments:  ct abdomen/ pelvis with contrast r/o sbo, to ED if symptoms worse  Orders:  -     CT abdomen pelvis w contrast; Future    Diarrhea of presumed infectious origin  -     CT abdomen pelvis w contrast; Future    Abdominal cramping  Comments:  hyoscyamine as needed   Orders:  -     hyoscyamine (LEVSIN/SL) 0 125 mg SL tablet;  Take 1 tablet (0 125 mg total) by mouth every 6 (six) hours as needed for cramping

## 2019-02-27 NOTE — PATIENT INSTRUCTIONS
Schedule ct abdomen/pelvis with contrast rule out small bowel obstruction  History may 2012  Hyoscyamine 1 tab under tongue every 6 hours as needed for abdominal cramping

## 2019-02-27 NOTE — TELEPHONE ENCOUNTER
Approval for CT Scan approved, Order ID number [id number] through [facility]  Patient notified and given Central Scheduling phone number

## 2019-03-02 ENCOUNTER — HOSPITAL ENCOUNTER (OUTPATIENT)
Dept: CT IMAGING | Facility: HOSPITAL | Age: 39
Discharge: HOME | End: 2019-03-02
Payer: COMMERCIAL

## 2019-03-02 DIAGNOSIS — R19.7 DIARRHEA OF PRESUMED INFECTIOUS ORIGIN: ICD-10-CM

## 2019-03-02 DIAGNOSIS — R10.33 PERIUMBILICAL ABDOMINAL PAIN: ICD-10-CM

## 2019-03-02 PROCEDURE — 74177 CT ABD & PELVIS W/CONTRAST: CPT

## 2019-03-02 RX ADMIN — IOHEXOL 100 ML: 350 INJECTION, SOLUTION INTRAVENOUS at 07:52

## 2019-03-06 ENCOUNTER — TELEPHONE (OUTPATIENT)
Dept: FAMILY MEDICINE CLINIC | Facility: CLINIC | Age: 39
End: 2019-03-06

## 2019-03-06 NOTE — TELEPHONE ENCOUNTER
Pt would like to have the results of the CT done on Saturday  Ct was done at [facility]  I see they are in the system      [phone]

## 2019-03-06 NOTE — TELEPHONE ENCOUNTER
Ct scan looks normal, no abnormalities in organs or bowels     Would schedule with gi for evaluation  If symptoms are getting worse, would go to ED

## 2019-05-06 ENCOUNTER — TELEPHONE (OUTPATIENT)
Dept: FAMILY MEDICINE CLINIC | Facility: CLINIC | Age: 39
End: 2019-05-06

## 2019-05-06 DIAGNOSIS — Z01.84 IMMUNITY TO MEASLES, MUMPS, AND RUBELLA DETERMINED BY SEROLOGIC TEST: Primary | ICD-10-CM

## 2019-05-13 LAB
MEV IGG SER IA-ACNC: >300 AU/ML
MUV IGG SER IA-ACNC: 284 AU/ML
RUBV IGG SERPL IA-ACNC: 9.12 INDEX

## 2019-09-03 ENCOUNTER — TELEPHONE (OUTPATIENT)
Dept: FAMILY MEDICINE CLINIC | Facility: CLINIC | Age: 39
End: 2019-09-03

## 2019-11-01 ENCOUNTER — DOCUMENTATION (OUTPATIENT)
Dept: FAMILY MEDICINE CLINIC | Facility: CLINIC | Age: 39
End: 2019-11-01

## 2019-11-04 ENCOUNTER — OFFICE VISIT (OUTPATIENT)
Dept: FAMILY MEDICINE CLINIC | Facility: CLINIC | Age: 39
End: 2019-11-04
Payer: COMMERCIAL

## 2019-11-04 VITALS
BODY MASS INDEX: 24.19 KG/M2 | HEART RATE: 73 BPM | DIASTOLIC BLOOD PRESSURE: 62 MMHG | OXYGEN SATURATION: 98 % | WEIGHT: 136.5 LBS | TEMPERATURE: 99.4 F | SYSTOLIC BLOOD PRESSURE: 90 MMHG | HEIGHT: 63 IN

## 2019-11-04 DIAGNOSIS — Z23 NEED FOR INFLUENZA VACCINATION: ICD-10-CM

## 2019-11-04 DIAGNOSIS — N64.4 BREAST PAIN, RIGHT: Primary | ICD-10-CM

## 2019-11-04 DIAGNOSIS — Z13.71 BRCA GENE MUTATION NEGATIVE IN FEMALE: ICD-10-CM

## 2019-11-04 PROBLEM — R68.89 ABNORMAL ANKLE BRACHIAL INDEX: Status: RESOLVED | Noted: 2017-08-29 | Resolved: 2019-11-04

## 2019-11-04 PROBLEM — R10.9 ABDOMINAL CRAMPING: Status: RESOLVED | Noted: 2019-02-27 | Resolved: 2019-11-04

## 2019-11-04 PROBLEM — R10.2 PELVIC PAIN IN FEMALE: Status: RESOLVED | Noted: 2019-01-08 | Resolved: 2019-11-04

## 2019-11-04 PROBLEM — R10.33 PERIUMBILICAL ABDOMINAL PAIN: Status: RESOLVED | Noted: 2019-02-27 | Resolved: 2019-11-04

## 2019-11-04 PROBLEM — N30.00 ACUTE CYSTITIS WITHOUT HEMATURIA: Status: RESOLVED | Noted: 2019-01-08 | Resolved: 2019-11-04

## 2019-11-04 PROCEDURE — 99214 OFFICE O/P EST MOD 30 MIN: CPT | Performed by: FAMILY MEDICINE

## 2019-11-04 PROCEDURE — 3008F BODY MASS INDEX DOCD: CPT | Performed by: FAMILY MEDICINE

## 2019-11-04 NOTE — PROGRESS NOTES
Subjective:   Chief Complaint   Patient presents with    Breast Pain     pt here with right breast pain  Started in September  Pt thought it would go away when she got her period, but it hasn't  Pt said it feels like a sharp stabbing pain  Pt said crossing her arms and bending over hurts  Pt does not feel like her breast has changed at all  Pain comes and goes  Pt can't think of anything she would have done to cause the pain  She has not been working out like she had been  Pt takes advil for the pain which helps at times  Pt said that her chest feels like it is on fire   Health Maintenance     Annual exam due  CMP/CBC complete 2/7/19  Pt defers flu shot  Patient ID: Ruba Biggs is a 41 y o  female      The pt is here today with pain in the right breast   It is all along the outer side of the right breast in the breast tissue itself  Started after her period in September  She figured she wait and see what happen until she got her next period, thought maybe it was hormonal  Her last period ended at the end of October  The pain has persisted and is actually getting worse  It hurts when she crosses her arms  It does not necessarily wake her from sleep  Had filled does help when she has the pain and feels it strong enough to need to take medication  She has not noticed any skin changes, no nipple discharge, no lumps or masses in the breast  He does have a strange symptom of feeling a burning in her chest if she takes a really deep breath  She will sometimes get this when she is getting sick but she does not feel like she is getting sick  Also this has been going on for over a month along with the breast discomfort  Aunt has breast and ovarian cancer, she is the only one in the family but pt did have the BRCA testing and it was negative        The following portions of the patient's history were reviewed and updated as appropriate: allergies, current medications, past family history, past medical history, past social history, past surgical history and problem list     Review of Systems      Objective:  Vitals:    11/04/19 0841   BP: 90/62   Pulse: 73   Temp: 99 4 °F (37 4 °C)   SpO2: 98%   Weight: 61 9 kg (136 lb 8 oz)   Height: 5' 3" (1 6 m)      Physical Exam   Constitutional: She is oriented to person, place, and time  She appears well-developed and well-nourished  No distress  Pulmonary/Chest: Effort normal  No respiratory distress  Right breast exhibits tenderness (Dreyer outer aspect of the right breast especially around eight o'clock)  Right breast exhibits no mass, no nipple discharge and no skin change  Neurological: She is alert and oriented to person, place, and time  No cranial nerve deficit  Coordination normal    Skin: Skin is warm and dry  No rash noted  She is not diaphoretic  No erythema  Psychiatric: She has a normal mood and affect  Her behavior is normal  Judgment and thought content normal          Assessment/Plan:    BRCA gene mutation negative in female  Paternal aunt with breast and ovarian cancer - the patient was tested last year at the recommendation of her OBGYN and she was negative  Diagnoses and all orders for this visit:    Breast pain, right  -     Mammo diagnostic right w cad; Future  -     US breast right limited (diagnostic); Future    I reassured the patient that generally breast cancer is not painful and that I do not feel any lumps either but I think the best plan of action would be to get a diagnostic mammogram and ultrasound to further characterize this as it has been going on for well over a month  I suspect there may be some sort of cyst   Depending on what is found and whether her pain persists I will likely refer her to Dr Layne, breast surgeon      BRCA gene mutation negative in female

## 2019-11-04 NOTE — ASSESSMENT & PLAN NOTE
Paternal aunt with breast and ovarian cancer - the patient was tested last year at the recommendation of her OBGYN and she was negative

## 2019-11-08 ENCOUNTER — HOSPITAL ENCOUNTER (OUTPATIENT)
Dept: MAMMOGRAPHY | Facility: CLINIC | Age: 39
Discharge: HOME | End: 2019-11-08
Payer: COMMERCIAL

## 2019-11-08 ENCOUNTER — HOSPITAL ENCOUNTER (OUTPATIENT)
Dept: ULTRASOUND IMAGING | Facility: CLINIC | Age: 39
Discharge: HOME | End: 2019-11-08
Payer: COMMERCIAL

## 2019-11-08 VITALS — HEIGHT: 63 IN | BODY MASS INDEX: 24.1 KG/M2 | WEIGHT: 136 LBS

## 2019-11-08 DIAGNOSIS — N64.4 BREAST PAIN, RIGHT: ICD-10-CM

## 2019-11-08 PROCEDURE — G0279 TOMOSYNTHESIS, MAMMO: HCPCS

## 2019-11-08 PROCEDURE — 77066 DX MAMMO INCL CAD BI: CPT

## 2019-11-08 PROCEDURE — 76642 ULTRASOUND BREAST LIMITED: CPT

## 2019-11-08 NOTE — PROGRESS NOTES
Met with patient and Dr Jes Restrepo                 regarding recommendation for;      __x___ RIGHT ______LEFT      __x___Ultrasound guided  ______Stereotactic  Breast biopsy  __x___Verbalized understanding        Blood thinners:  _____yes ___x__no    Date stopped: ____n/a_______    Biopsy teaching sheet given:  ____x___yes ______no

## 2019-11-18 ENCOUNTER — HOSPITAL ENCOUNTER (OUTPATIENT)
Dept: MAMMOGRAPHY | Facility: CLINIC | Age: 39
Discharge: HOME | End: 2019-11-18

## 2019-11-18 ENCOUNTER — HOSPITAL ENCOUNTER (OUTPATIENT)
Dept: ULTRASOUND IMAGING | Facility: CLINIC | Age: 39
Discharge: HOME | End: 2019-11-18
Admitting: RADIOLOGY
Payer: COMMERCIAL

## 2019-11-18 VITALS — DIASTOLIC BLOOD PRESSURE: 72 MMHG | SYSTOLIC BLOOD PRESSURE: 120 MMHG | HEART RATE: 80 BPM

## 2019-11-18 VITALS — WEIGHT: 136 LBS | HEIGHT: 63 IN | BODY MASS INDEX: 24.1 KG/M2

## 2019-11-18 DIAGNOSIS — R92.8 ABNORMAL MAMMOGRAM: ICD-10-CM

## 2019-11-18 DIAGNOSIS — R92.8 ABNORMAL ULTRASOUND OF BREAST: ICD-10-CM

## 2019-11-18 DIAGNOSIS — R92.8 ABNORMAL FINDINGS ON DIAGNOSTIC IMAGING OF BREAST: ICD-10-CM

## 2019-11-18 PROCEDURE — 88342 IMHCHEM/IMCYTCHM 1ST ANTB: CPT | Performed by: PATHOLOGY

## 2019-11-18 PROCEDURE — 88341 IMHCHEM/IMCYTCHM EA ADD ANTB: CPT | Performed by: PATHOLOGY

## 2019-11-18 PROCEDURE — 88305 TISSUE EXAM BY PATHOLOGIST: CPT | Performed by: PATHOLOGY

## 2019-11-18 PROCEDURE — 19083 BX BREAST 1ST LESION US IMAG: CPT

## 2019-11-18 RX ORDER — LIDOCAINE HYDROCHLORIDE 10 MG/ML
4 INJECTION, SOLUTION INFILTRATION; PERINEURAL ONCE
Status: COMPLETED | OUTPATIENT
Start: 2019-11-18 | End: 2019-11-18

## 2019-11-18 RX ADMIN — LIDOCAINE HYDROCHLORIDE 4 ML: 10 INJECTION, SOLUTION INFILTRATION; PERINEURAL at 15:30

## 2019-11-18 NOTE — PROGRESS NOTES
Procedure type:    __x___ultrasound guided _____stereotactic    Breast:    _____Left __x___Right    Location: 7:00 5cm from nipple    Needle: 12g Bart    # of passes: 3    Clip: Ultraclip-wing    Performed by: Dr Niesha Manning held for 5 minutes by: Main Dumont Strips:    _x___yes _____no    Band aid:    __x___yes_____no    Tape and guaze:    _____yes ___x__no    Tolerated procedure:    __x___yes _____no

## 2019-11-18 NOTE — DISCHARGE INSTR - OTHER ORDERS
POST LARGE CORE BREAST BIOPSY PATIENT INFORMATION      1  Place an ice pack inside your bra over the top of the dressing every hour for 20 minutes (20 minutes on, 60 minutes off)  Do this until bedtime  2  Do not shower or bathe until the following morning  3  You may bathe your breast carefully with the steri-strips in place  Be careful    Not to loosen them  The steri-strips will fall off in 3-5 days  4  You may have mild discomfort, and you may have some bruising where the   Needle entered the skin  This should clear within 5-7 days  5  If you need medicine for discomfort, take acetaminophen products such as   Tylenol  You may also take Advil or Motrin products  6  Do not participate in strenuous activities such as-tennis, aerobics, skiing,  Weight lifting, etc  for 24 hours  Refrain from swimming/soaking for 72 hours  7  Wearing a bra for sleeping may be more comfortable for the first 24-48 hours  8  Watch for continued bleeding, pain or fever over 101; please call with any questions or concerns  For [facility] call:  Theo Solares RN at [phone]  Danielle Simons RN at [phone]                    *After 4 PM call the Interventional Radiology Department                    [phone] and ask to speak with the nurse on call  For procedures done at the [address] [place] [address] call:         Ross Lozano RN at   *After 4 PM call the Interventional Radiology Department   [phone] and ask to speak with the nurse on call  For procedures done at [address] [place] [address] call: The Radiology Nurse at [phone]  *After 4 PM call your physician, or go to the Emergency Department  9          The final results of your biopsy are usually available within one week

## 2019-11-19 NOTE — PROGRESS NOTES
Post procedure call completed on 11/19/19 @ 1044    Bleeding: _____yes __x___no    Pain: _____yes ___x___no    Redness/Swelling: ______yes ___x___no    Band aid removed: _____yes ___x__no    Steri-Strips intact: ___x___yes _____no

## 2019-11-20 ENCOUNTER — TELEPHONE (OUTPATIENT)
Dept: MAMMOGRAPHY | Facility: CLINIC | Age: 39
End: 2019-11-20

## 2020-05-11 ENCOUNTER — OFFICE VISIT (OUTPATIENT)
Dept: FAMILY MEDICINE CLINIC | Facility: CLINIC | Age: 40
End: 2020-05-11
Payer: COMMERCIAL

## 2020-05-11 VITALS
RESPIRATION RATE: 16 BRPM | WEIGHT: 138.8 LBS | OXYGEN SATURATION: 98 % | HEIGHT: 63 IN | SYSTOLIC BLOOD PRESSURE: 120 MMHG | TEMPERATURE: 97.8 F | HEART RATE: 77 BPM | BODY MASS INDEX: 24.59 KG/M2 | DIASTOLIC BLOOD PRESSURE: 80 MMHG

## 2020-05-11 DIAGNOSIS — Z00.00 WELL ADULT EXAM: Primary | ICD-10-CM

## 2020-05-11 DIAGNOSIS — Z13.29 SCREENING FOR ENDOCRINE DISORDER: ICD-10-CM

## 2020-05-11 DIAGNOSIS — Z13.0 SCREENING FOR DEFICIENCY ANEMIA: ICD-10-CM

## 2020-05-11 DIAGNOSIS — Q24.9 CONGENITAL HEART DISEASE: ICD-10-CM

## 2020-05-11 DIAGNOSIS — H10.13 ALLERGIC CONJUNCTIVITIS OF BOTH EYES: ICD-10-CM

## 2020-05-11 DIAGNOSIS — Z13.220 SCREENING FOR LIPID DISORDERS: ICD-10-CM

## 2020-05-11 DIAGNOSIS — Z13.29 SCREENING FOR THYROID DISORDER: ICD-10-CM

## 2020-05-11 DIAGNOSIS — J30.2 SEASONAL ALLERGIES: ICD-10-CM

## 2020-05-11 PROBLEM — R19.7 DIARRHEA OF PRESUMED INFECTIOUS ORIGIN: Status: RESOLVED | Noted: 2019-02-27 | Resolved: 2020-05-11

## 2020-05-11 PROCEDURE — 3008F BODY MASS INDEX DOCD: CPT | Performed by: FAMILY MEDICINE

## 2020-05-11 PROCEDURE — 99395 PREV VISIT EST AGE 18-39: CPT | Performed by: FAMILY MEDICINE

## 2020-05-11 RX ORDER — CETIRIZINE HYDROCHLORIDE 10 MG/1
10 TABLET ORAL DAILY
COMMUNITY

## 2020-05-11 RX ORDER — OLOPATADINE HYDROCHLORIDE 1 MG/ML
1 SOLUTION/ DROPS OPHTHALMIC DAILY
Qty: 5 ML | Refills: 5 | Status: SHIPPED | OUTPATIENT
Start: 2020-05-11 | End: 2021-01-21 | Stop reason: ALTCHOICE

## 2020-05-11 RX ORDER — MONTELUKAST SODIUM 10 MG/1
10 TABLET ORAL
Qty: 30 TABLET | Refills: 5 | Status: SHIPPED | OUTPATIENT
Start: 2020-05-11 | End: 2020-11-09

## 2020-05-11 RX ORDER — SOTALOL HYDROCHLORIDE 80 MG/1
120 TABLET ORAL 2 TIMES DAILY
COMMUNITY
Start: 2015-06-23 | End: 2020-05-11 | Stop reason: ALTCHOICE

## 2020-11-09 DIAGNOSIS — J30.2 SEASONAL ALLERGIES: ICD-10-CM

## 2020-11-09 RX ORDER — MONTELUKAST SODIUM 10 MG/1
TABLET ORAL
Qty: 30 TABLET | Refills: 5 | Status: SHIPPED | OUTPATIENT
Start: 2020-11-09 | End: 2020-11-10

## 2020-11-10 DIAGNOSIS — J30.2 SEASONAL ALLERGIES: ICD-10-CM

## 2020-11-10 RX ORDER — MONTELUKAST SODIUM 10 MG/1
TABLET ORAL
Qty: 30 TABLET | Refills: 5 | Status: SHIPPED | OUTPATIENT
Start: 2020-11-10 | End: 2021-01-21 | Stop reason: ALTCHOICE

## 2021-01-12 ENCOUNTER — TELEMEDICINE (OUTPATIENT)
Dept: FAMILY MEDICINE CLINIC | Facility: CLINIC | Age: 41
End: 2021-01-12
Payer: COMMERCIAL

## 2021-01-12 VITALS — BODY MASS INDEX: 24.45 KG/M2 | HEIGHT: 63 IN | WEIGHT: 138 LBS | TEMPERATURE: 98.7 F

## 2021-01-12 DIAGNOSIS — J01.00 ACUTE NON-RECURRENT MAXILLARY SINUSITIS: Primary | ICD-10-CM

## 2021-01-12 DIAGNOSIS — J01.01 ACUTE RECURRENT MAXILLARY SINUSITIS: ICD-10-CM

## 2021-01-12 PROBLEM — Z80.0 FAMILY HISTORY OF RECTAL CANCER: Status: ACTIVE | Noted: 2020-11-02

## 2021-01-12 PROCEDURE — 3725F SCREEN DEPRESSION PERFORMED: CPT | Performed by: FAMILY MEDICINE

## 2021-01-12 PROCEDURE — 99213 OFFICE O/P EST LOW 20 MIN: CPT | Performed by: FAMILY MEDICINE

## 2021-01-12 PROCEDURE — 1036F TOBACCO NON-USER: CPT | Performed by: FAMILY MEDICINE

## 2021-01-12 PROCEDURE — 3008F BODY MASS INDEX DOCD: CPT | Performed by: FAMILY MEDICINE

## 2021-01-12 RX ORDER — CEFUROXIME AXETIL 250 MG/1
250 TABLET ORAL EVERY 12 HOURS SCHEDULED
Qty: 20 TABLET | Refills: 0 | Status: SHIPPED | OUTPATIENT
Start: 2021-01-12 | End: 2021-01-22

## 2021-01-21 PROBLEM — J01.00 ACUTE NON-RECURRENT MAXILLARY SINUSITIS: Status: ACTIVE | Noted: 2018-01-09

## 2021-01-21 NOTE — PROGRESS NOTES
Virtual Regular Visit      Assessment/Plan:    Problem List Items Addressed This Visit        Respiratory    Acute recurrent maxillary sinusitis - Primary     Recurrent sinusitis, cefuroxime 1 tab twice a day  Call if ongoing or changing symptoms  Relevant Medications    cefuroxime (CEFTIN) 250 mg tablet               Reason for visit is   Chief Complaint   Patient presents with    sinus infection     pt states she think she's having a sinus infection, Left ear ache, nasal congestion yellow mucus  in the morning pt has sore throat pt states had a headaches noticed more at night and when she wake up  no fever  pt states on [place] [address] she noticed stuffy nose, headaches  no exposure, no travel   Virtual Regular Visit        Encounter provider James Coles DO    Provider located at [address] [place] [phone]      Recent Visits  No visits were found meeting these conditions  Showing recent visits within past 7 days and meeting all other requirements     Future Appointments  No visits were found meeting these conditions  Showing future appointments within next 150 days and meeting all other requirements        The patient was identified by name and date of birth  Ronda Montiel was informed that this is a telemedicine visit and that the visit is being conducted through [facility] and patient was informed that this is a secure, HIPAA-compliant platform  She agrees to proceed     My office door was closed  No one else was in the room  She acknowledged consent and understanding of privacy and security of the video platform  The patient has agreed to participate and understands they can discontinue the visit at any time  Patient is aware this is a billable service  Subjective  Ronda Montiel is a 47 y o  female complains of sinus symptoms   Pt complains of symptoms starting in the beginning of the year, just after new years   Complains of nasal congestion ongoing, sinus pressure, thick nasal discharge, no fever, dry cough, no loss of sense of taste or smell, no gi symptoms  She denies any known exposure to covid 19          Past Medical History:   Diagnosis Date    Atrial fibrillation ([facility] )     last assessed 03/14/2014    BRCA1 negative     BRCA2 negative     Complete transposition of great vessels     last assessed 02/14/2014    Eczema     GERD (gastroesophageal reflux disease)     History of cardiac cath     Cath RVOT subpulmonary obstruction, last assessed 06/19/2012    Hyperbilirubinemia (congenital)     Migraine     SSS (sick sinus syndrome) ([facility] )     Torsion of small intestine (HCC)        Past Surgical History:   Procedure Laterality Date    ADENOIDECTOMY      onset childhood    ATRIAL SEPTECTOMY      balloon transvenous method    CARDIAC PACEMAKER PLACEMENT      last assessed 03/14/2014    LAPAROSCOPIC LYSIS INTESTINAL ADHESIONS      OOPHORECTOMY      OTHER SURGICAL HISTORY      transposition repair mustard procedure, description 11 months    TONSILLECTOMY      TUBAL LIGATION      last assessed 08/17/2012    US GUIDED BREAST BIOPSY RIGHT COMPLETE Right 11/18/2019       Current Outpatient Medications   Medication Sig Dispense Refill    ascorbic acid (VITAMIN C) 500 mg tablet Take by mouth      aspirin (ECOTRIN LOW STRENGTH) 81 mg EC tablet Take 81 mg by mouth daily      cetirizine (ZyrTEC) 10 mg tablet Take 10 mg by mouth daily      desogestrel-ethinyl estradiol (APRI) 0 15-30 MG-MCG per tablet Take 1 tablet by mouth daily      fluticasone (FLONASE) 50 mcg/act nasal spray 2 sprays into each nostril daily      hyoscyamine (LEVSIN/SL) 0 125 mg SL tablet Take 1 tablet (0 125 mg total) by mouth every 6 (six) hours as needed for cramping 20 tablet 1    meclizine (ANTIVERT) 25 mg tablet Take 25 mg by mouth      mometasone (ELOCON) 0 1 % cream APPLY TO AFFECTED AREA(S) TWO TIMES A DAY FOR 2 WEEKS THEN STOP FOR 1 WEEK THEN REPEAT AS NEEDED FOR ECZEMA      sotalol (BETAPACE) 120 mg tablet Take 1 tablet by mouth 2 (two) times a day      cefuroxime (CEFTIN) 250 mg tablet Take 1 tablet (250 mg total) by mouth every 12 (twelve) hours for 10 days 20 tablet 0    MULTIPLE VITAMINS-MINERALS PO Take 1 tablet by mouth daily       No current facility-administered medications for this visit  Allergies   Allergen Reactions    Enalapril      Reaction Date: [id number];   Pt denies 2/2018  Patient stated it may interact with sotalol   Other      Other reaction(s): Other  Tongue feels funny    Penicillins Hives     Category: Allergy; Review of Systems   Constitutional: Positive for fatigue  Negative for fever  HENT: Positive for congestion, postnasal drip and sinus pressure  Eyes: Negative  Respiratory: Positive for cough  Negative for shortness of breath  Cardiovascular: Negative  Gastrointestinal: Negative  Endocrine: Negative  Genitourinary: Negative  Musculoskeletal: Negative  Skin: Negative  Allergic/Immunologic: Negative  Neurological: Negative  Psychiatric/Behavioral: Negative  Video Exam    Vitals:    01/12/21 1445   Temp: 98 7 °F (37 1 °C)   Weight: 62 6 kg (138 lb)   Height: 5' 3" (1 6 m)       Physical Exam  Vitals signs and nursing note reviewed  Constitutional:       Appearance: She is well-developed  Eyes:      Conjunctiva/sclera: Conjunctivae normal    Pulmonary:      Effort: Pulmonary effort is normal    Neurological:      Mental Status: She is alert and oriented to person, place, and time  Psychiatric:         Behavior: Behavior normal          Thought Content: Thought content normal          Judgment: Judgment normal           I spent 12 minutes directly with the patient during this visit      [address] acknowledges that she has consented to an online visit or consultation   She understands that the online visit is based solely on information provided by her, and that, in the absence of a face-to-face physical evaluation by the physician, the diagnosis she receives is both limited and provisional in terms of accuracy and completeness  This is not intended to replace a full medical face-to-face evaluation by the physician  Porter Shins understands and accepts these terms

## 2021-02-11 ENCOUNTER — TELEPHONE (OUTPATIENT)
Dept: FAMILY MEDICINE CLINIC | Facility: CLINIC | Age: 41
End: 2021-02-11

## 2021-02-11 NOTE — TELEPHONE ENCOUNTER
Pt states she has been getting a lot of cards from [facility] states she needs a mammogram  She wanted to know what do you suggest?

## 2021-05-25 ENCOUNTER — OFFICE VISIT (OUTPATIENT)
Dept: FAMILY MEDICINE CLINIC | Facility: CLINIC | Age: 41
End: 2021-05-25
Payer: COMMERCIAL

## 2021-05-25 VITALS
HEART RATE: 79 BPM | TEMPERATURE: 99.4 F | DIASTOLIC BLOOD PRESSURE: 64 MMHG | HEIGHT: 63 IN | SYSTOLIC BLOOD PRESSURE: 92 MMHG | OXYGEN SATURATION: 97 % | BODY MASS INDEX: 24.72 KG/M2 | WEIGHT: 139.5 LBS

## 2021-05-25 DIAGNOSIS — R14.0 ABDOMINAL BLOATING: ICD-10-CM
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
## 2021-05-25 DIAGNOSIS — L85.3 DRY SKIN: ICD-10-CM

## 2021-05-25 DIAGNOSIS — M25.521 ARTHRALGIA OF RIGHT ELBOW: ICD-10-CM

## 2021-05-25 DIAGNOSIS — M25.571 ARTHRALGIA OF RIGHT ANKLE: ICD-10-CM

## 2021-05-25 PROCEDURE — 99213 OFFICE O/P EST LOW 20 MIN: CPT | Performed by: NURSE PRACTITIONER

## 2021-05-25 PROCEDURE — 3008F BODY MASS INDEX DOCD: CPT | Performed by: NURSE PRACTITIONER

## 2021-05-25 PROCEDURE — 1036F TOBACCO NON-USER: CPT | Performed by: NURSE PRACTITIONER

## 2021-05-25 RX ORDER — DOXYCYCLINE HYCLATE 100 MG
100 TABLET ORAL 2 TIMES DAILY
Qty: 42 TABLET | Refills: 0 | Status: SHIPPED | OUTPATIENT
Start: 2021-05-25 | End: 2021-06-15

## 2021-05-25 NOTE — PROGRESS NOTES
[address]  [facility]        NAME: Delroy Marcano is a 47 y o  female  : 1980    MRN: [id number]  DATE: May 25, 2021  TIME: 1:26 PM    Assessment and Plan   Tick bite, initial encounter [W57  XXXA]  1  Tick bite, initial encounter  doxycycline hyclate (VIBRA-TABS) 100 mg tablet   2  Arthralgia of right elbow     3  Arthralgia of right ankle     4  Abdominal bloating     5  Dry skin           Patient Instructions     Patient Instructions   Doxycycline as ordered for tick bite prophylaxis  Tylenol/Ibuprofen as directed for joint pain  OTC simethicone as directed for abdominal bloating  OTC Prilosec as directed for abdominal bloating/epigastric discomfort  Increase water intake  Avoid hot showers and keep skin well moisturized to prevent itching/dry skin  Call with any problems or concerns  Chief Complaint     Chief Complaint   Patient presents with    Insect Bite     tick bite 2 weeks ago   one on scalp and one on right hip   abdominal bloating about 2 weeks ago with menses but never left  itching started about one week ago especially on her back  History of Present Illness       C/o tick bite x 2 about 2 weeks ago, one on head and one on right flank  No rash  right elbow and right ankle joint pain  C/o abdominal bloating-started with menses 2 weeks ago but did not go away when menses ended  C/o itching 1 week ago, mostly on back  Review of Systems   Review of Systems   Constitutional: Negative for activity change, diaphoresis and fever  HENT: Negative  Respiratory: Negative for cough, chest tightness and shortness of breath  Cardiovascular: Negative for chest pain  Gastrointestinal: Positive for abdominal distention  Negative for abdominal pain, blood in stool, constipation, diarrhea and nausea  Genitourinary: Negative  Musculoskeletal: Positive for arthralgias   Negative for back pain, gait problem, joint swelling, neck pain and neck stiffness  Skin: Negative for color change and rash  itching   Neurological: Negative for dizziness and headaches  Psychiatric/Behavioral: Negative            Current Medications       Current Outpatient Medications:     ascorbic acid (VITAMIN C) 500 mg tablet, Take by mouth, Disp: , Rfl:     aspirin (ECOTRIN LOW STRENGTH) 81 mg EC tablet, Take 81 mg by mouth daily, Disp: , Rfl:     cetirizine (ZyrTEC) 10 mg tablet, Take 10 mg by mouth daily, Disp: , Rfl:     desogestrel-ethinyl estradiol (APRI) 0 15-30 MG-MCG per tablet, Take 1 tablet by mouth daily, Disp: , Rfl:     fluticasone (FLONASE) 50 mcg/act nasal spray, 2 sprays into each nostril daily, Disp: , Rfl:     meclizine (ANTIVERT) 25 mg tablet, Take 25 mg by mouth as needed , Disp: , Rfl:     MULTIPLE VITAMINS-MINERALS PO, Take 1 tablet by mouth daily, Disp: , Rfl:     sotalol (BETAPACE) 120 mg tablet, Take 1 tablet by mouth 2 (two) times a day, Disp: , Rfl:     doxycycline hyclate (VIBRA-TABS) 100 mg tablet, Take 1 tablet (100 mg total) by mouth 2 (two) times a day for 21 days, Disp: 42 tablet, Rfl: 0    hyoscyamine (LEVSIN/SL) 0 125 mg SL tablet, Take 1 tablet (0 125 mg total) by mouth every 6 (six) hours as needed for cramping (Patient not taking: Reported on 5/25/2021), Disp: 20 tablet, Rfl: 1    mometasone (ELOCON) 0 1 % cream, APPLY TO AFFECTED AREA(S) TWO TIMES A DAY FOR 2 WEEKS THEN STOP FOR 1 WEEK THEN REPEAT AS NEEDED FOR ECZEMA, Disp: , Rfl:     Current Allergies     Allergies as of 05/25/2021 - Reviewed 05/25/2021   Allergen Reaction Noted    Enalapril  04/21/2012    Other  02/27/2009    Penicillins Hives 06/19/2012            The following portions of the patient's history were reviewed and updated as appropriate: allergies, current medications, past family history, past medical history, past social history, past surgical history and problem list      Past Medical History:   Diagnosis Date    Atrial fibrillation ([facility] ) last assessed 03/14/2014    BRCA1 negative     BRCA2 negative     Complete transposition of great vessels     last assessed 02/14/2014    Eczema     GERD (gastroesophageal reflux disease)     History of cardiac cath     Cath RVOT subpulmonary obstruction, last assessed 06/19/2012    Hyperbilirubinemia (congenital)     Migraine     [facility]SS [facility]sick [facility] syndrome) ([facility] [address] [facility]     Torsion of small intestine ([facility] )        Past Surgical History:   Procedure Laterality Date    ADENOIDECTOMY      onset childhood    ATRIAL SEPTECTOMY      balloon transvenous method    CARDIAC PACEMAKER PLACEMENT      last assessed 03/14/2014    LAPAROSCOPIC LYSIS INTESTINAL ADHESIONS      OOPHORECTOMY      OTHER SURGICAL HISTORY      transposition repair mustard procedure, description 6 months    TONSILLECTOMY      TUBAL LIGATION      last assessed 08/17/2012    US GUIDED BREAST BIOPSY RIGHT COMPLETE Right 11/18/2019       Family History   Problem Relation Age of Onset    Cancer Father     Colon cancer Sister 25        carcinoid tumor in the rectum    Heart disease Maternal Grandmother     Hyperlipidemia Maternal Grandmother     Diabetes Paternal Grandmother     Hypertension Paternal Grandmother     Lung cancer Paternal Grandmother     Heart disease Paternal Grandfather     Hyperlipidemia Paternal Grandfather     Lung cancer Paternal Grandfather     No Known Problems Mother     No Known Problems Daughter     Cancer Maternal Grandfather         tongue    Breast cancer Paternal Aunt 46    Ovarian cancer Paternal Aunt 27    No Known Problems Paternal Aunt     No Known Problems Maternal Aunt          Medications have been verified  Objective   BP 92/64   Pulse 79   Temp 99 4 °F (37 4 °C)   Ht 5' 3" (1 6 m)   Wt 63 3 kg (139 lb 8 oz)   SpO2 97%   BMI 24 71 kg/m²        Physical Exam     Physical Exam  Vitals signs and nursing note reviewed  Constitutional:       General: She is not in acute distress  Appearance: Normal appearance  She is well-developed  She is not ill-appearing or diaphoretic  HENT:      Head: Normocephalic  Neck:      Musculoskeletal: Normal range of motion  Thyroid: No thyromegaly  Trachea: No tracheal deviation  Cardiovascular:      Rate and Rhythm: Normal rate and regular rhythm  Heart sounds: Normal heart sounds  No murmur  No gallop  Pulmonary:      Effort: Pulmonary effort is normal  No respiratory distress  Breath sounds: Normal breath sounds  No wheezing  Abdominal:      General: Bowel sounds are normal  There is no distension  Palpations: Abdomen is soft  There is no mass  Tenderness: There is abdominal tenderness in the epigastric area  There is no guarding or rebound  Hernia: No hernia is present  Musculoskeletal: Normal range of motion  General: Tenderness (right elbow, right ankle) present  No swelling or deformity  Skin:     General: Skin is warm and dry  Coloration: Skin is not pale  Findings: No erythema or rash  Neurological:      Mental Status: She is alert and oriented to person, place, and time  Psychiatric:         Mood and Affect: Mood normal          Behavior: Behavior normal  Behavior is cooperative  Thought Content:  Thought content normal          Judgment: Judgment normal

## 2021-05-25 NOTE — PATIENT INSTRUCTIONS
Doxycycline as ordered for tick bite prophylaxis  Tylenol/Ibuprofen as directed for joint pain  OTC simethicone as directed for abdominal bloating  OTC Prilosec as directed for abdominal bloating/epigastric discomfort  Increase water intake  If no improvement will refer to GI  Avoid hot showers and keep skin well moisturized to prevent itching/dry skin  Call with any problems or concerns

## 2021-06-01 ENCOUNTER — OFFICE VISIT (OUTPATIENT)
Dept: URGENT CARE | Facility: CLINIC | Age: 41
End: 2021-06-01
Payer: COMMERCIAL

## 2021-06-01 VITALS
WEIGHT: 139 LBS | OXYGEN SATURATION: 99 % | DIASTOLIC BLOOD PRESSURE: 58 MMHG | HEIGHT: 63 IN | BODY MASS INDEX: 24.63 KG/M2 | RESPIRATION RATE: 18 BRPM | TEMPERATURE: 99.8 F | HEART RATE: 90 BPM | SYSTOLIC BLOOD PRESSURE: 114 MMHG

## 2021-06-01 DIAGNOSIS — M25.571 ARTHRALGIA OF RIGHT ANKLE: Primary | ICD-10-CM

## 2021-06-01 PROCEDURE — 99213 OFFICE O/P EST LOW 20 MIN: CPT | Performed by: FAMILY MEDICINE

## 2021-06-01 NOTE — PROGRESS NOTES
[facility] [employer] [facility]        NAME: Tyesha Mcgee is a 47 y o  female  : 1980    MRN: [id number]  DATE: 2021  TIME: 5:28 PM    Assessment and Plan   Arthralgia of right ankle [M25 571]  1  Arthralgia of right ankle  Diclofenac Sodium (Voltaren) 1 %         Patient Instructions       Follow up with PCP in 3-5 days  Proceed to  ER if symptoms worsen  Chief Complaint     Chief Complaint   Patient presents with    Ankle Pain     right  No trauma  She has no idea of why she has pain for a week   History of Present Illness         82-year-old female with right ankle pain for the past 1 week  Does not recall any injuries or trauma  Pain is located along the lateral aspect of the ankle described as a burning sensation  She is currently on doxycycline therapy for suspected Lyme disease  Review of Systems   Review of Systems   Constitutional: Negative  HENT: Negative  Eyes: Negative  Respiratory: Negative  Cardiovascular: Negative  Gastrointestinal: Negative  Genitourinary: Negative  Musculoskeletal: Positive for arthralgias and myalgias  Skin: Negative  Allergic/Immunologic: Negative  Neurological: Negative  Hematological: Negative  Psychiatric/Behavioral: Negative            Current Medications       Current Outpatient Medications:     ascorbic acid (VITAMIN C) 500 mg tablet, Take by mouth, Disp: , Rfl:     aspirin (ECOTRIN LOW STRENGTH) 81 mg EC tablet, Take 81 mg by mouth daily, Disp: , Rfl:     cetirizine (ZyrTEC) 10 mg tablet, Take 10 mg by mouth daily, Disp: , Rfl:     desogestrel-ethinyl estradiol (APRI) 0 15-30 MG-MCG per tablet, Take 1 tablet by mouth daily, Disp: , Rfl:     doxycycline hyclate (VIBRA-TABS) 100 mg tablet, Take 1 tablet (100 mg total) by mouth 2 (two) times a day for 21 days, Disp: 42 tablet, Rfl: 0    fluticasone (FLONASE) 50 mcg/act nasal spray, 2 sprays into each nostril daily, Disp: , Rfl:     MULTIPLE VITAMINS-MINERALS PO, Take 1 tablet by mouth daily, Disp: , Rfl:     sotalol (BETAPACE) 120 mg tablet, Take 1 tablet by mouth 2 (two) times a day, Disp: , Rfl:     Diclofenac Sodium (Voltaren) 1 %, Apply 2 g topically 4 (four) times a day, Disp: 150 g, Rfl: 0    hyoscyamine (LEVSIN/SL) 0 125 mg SL tablet, Take 1 tablet (0 125 mg total) by mouth every 6 (six) hours as needed for cramping (Patient not taking: Reported on 5/25/2021), Disp: 20 tablet, Rfl: 1    meclizine (ANTIVERT) 25 mg tablet, Take 25 mg by mouth as needed , Disp: , Rfl:     mometasone (ELOCON) 0 1 % cream, APPLY TO AFFECTED AREA(S) TWO TIMES A DAY FOR 2 WEEKS THEN STOP FOR 1 WEEK THEN REPEAT AS NEEDED FOR ECZEMA, Disp: , Rfl:     Current Allergies     Allergies as of 06/01/2021 - Reviewed 06/01/2021   Allergen Reaction Noted    Enalapril  04/21/2012    Other  02/27/2009    Penicillins Hives 06/19/2012            The following portions of the patient's history were reviewed and updated as appropriate: allergies, current medications, past family history, past medical history, past social history, past surgical history and problem list      Past Medical History:   Diagnosis Date    Atrial fibrillation ([facility] [address] )     last assessed 03/14/2014    BRCA1 negative     BRCA2 negative     Complete transposition of great vessels     last assessed 02/14/2014    Eczema     GERD (gastroesophageal reflux disease)     History of cardiac cath     Cath RVOT subpulmonary obstruction, last assessed 06/19/2012    Hyperbilirubinemia (congenital)     Migraine     SSS (sick sinus syndrome) ([facility] [address] [facility]     Torsion of small intestine (HCC)        Past Surgical History:   Procedure Laterality Date    ADENOIDECTOMY      onset childhood    ATRIAL SEPTECTOMY      balloon transvenous method    CARDIAC PACEMAKER PLACEMENT      last assessed 03/14/2014    LAPAROSCOPIC LYSIS INTESTINAL ADHESIONS      OOPHORECTOMY      OTHER SURGICAL HISTORY      transposition repair mustard procedure, description 11 months    TONSILLECTOMY      TUBAL LIGATION      last assessed 08/17/2012    US GUIDED BREAST BIOPSY RIGHT COMPLETE Right 11/18/2019       Family History   Problem Relation Age of Onset    Cancer Father     Colon cancer Sister 25        carcinoid tumor in the rectum    Heart disease Maternal Grandmother     Hyperlipidemia Maternal Grandmother     Diabetes Paternal Grandmother     Hypertension Paternal Grandmother     Lung cancer Paternal Grandmother     Heart disease Paternal Grandfather     Hyperlipidemia Paternal Grandfather     Lung cancer Paternal Grandfather     No Known Problems Mother     No Known Problems Daughter     Cancer Maternal Grandfather         tongue    Breast cancer Paternal Aunt 46    Ovarian cancer Paternal Aunt 27    No Known Problems Paternal Aunt     No Known Problems Maternal Aunt          Medications have been verified  Objective   /58   Pulse 90   Temp 99 8 °F (37 7 °C)   Resp 18   Ht 5' 3" (1 6 m)   Wt 63 kg (139 lb)   SpO2 99%   BMI 24 62 kg/m²   No LMP recorded  Physical Exam     Physical Exam  Vitals signs and nursing note reviewed  Constitutional:       Appearance: She is well-developed  HENT:      Head: Normocephalic  Eyes:      Pupils: Pupils are equal, round, and reactive to light  Pulmonary:      Effort: Pulmonary effort is normal    Musculoskeletal:      Right ankle: She exhibits decreased range of motion and swelling  Tenderness  Lateral malleolus tenderness found  Skin:     General: Skin is warm and dry  Neurological:      Mental Status: She is alert and oriented to person, place, and time

## 2021-08-25 ENCOUNTER — OFFICE VISIT (OUTPATIENT)
Dept: URGENT CARE | Facility: CLINIC | Age: 41
End: 2021-08-25
Payer: COMMERCIAL

## 2021-08-25 VITALS
HEIGHT: 63 IN | HEART RATE: 71 BPM | RESPIRATION RATE: 16 BRPM | WEIGHT: 140.8 LBS | DIASTOLIC BLOOD PRESSURE: 57 MMHG | OXYGEN SATURATION: 98 % | SYSTOLIC BLOOD PRESSURE: 112 MMHG | TEMPERATURE: 98.8 F | BODY MASS INDEX: 24.95 KG/M2

## 2021-08-25 DIAGNOSIS — H92.02 LEFT EAR PAIN: Primary | ICD-10-CM

## 2021-08-25 PROCEDURE — 99213 OFFICE O/P EST LOW 20 MIN: CPT

## 2021-08-25 RX ORDER — CEPHALEXIN 500 MG/1
500 CAPSULE ORAL EVERY 6 HOURS SCHEDULED
Qty: 28 CAPSULE | Refills: 0 | Status: SHIPPED | OUTPATIENT
Start: 2021-08-25 | End: 2021-09-01

## 2021-08-25 NOTE — PROGRESS NOTES
NAME: Falguni Weldon is a 47 y o  female  : 1980    MRN: [id number]      Assessment and Plan   Left ear pain [H92 02]  1  Left ear pain  cephalexin (KEFLEX) 500 mg capsule       Discussed with patient could be an ear infection but unable to visualize  Will not lavage at this time given the pain  Can empirically treat for otitis media and if no improvement with antibiotics she should follow-up with her family doctor  States she is able to take Keflex is not allergic  She acknowledges      Patient Instructions   Patient Instructions    Continue over-the-counter medications  Keflex as directed   If no improvement to 3 days follow-up with PCP  If any changes or worsens follow-up sooner /go the ER    Proceed to ER if symptoms worsen  Chief Complaint     Chief Complaint   Patient presents with    Earache     Onset 2 weeks left ear pressure with rash on that side of face  Rash has gone away since  Left ear decreased hearing  Last night a numb feeling from left ear to side of face  History of Present Illness    Patient with history of Afib presents complaining of left ear pain x2 weeks  States 2 weeks ago she started with gradual onset of pressure to her left ear  States over time has been worsening and is now developing into times of having sharp pain  Reports she has been taking Zyrtec, Flonase and taking ibuprofen intermittently  Reports last night along with the pain she has been having and numb/tingling sensation to the pre-auricular area  She states she has been having and intermittently stuffy nose runny nose but denies any fevers, chills, headache, dizziness, lightheadedness, sore throat or shortness of breath  Reports she has also been having muffled hearing which has also been worsening  Review of Systems   Review of Systems   Constitutional: Negative for chills and fever  HENT: Positive for congestion, ear pain, postnasal drip, rhinorrhea and sinus pressure   Negative for ear discharge and sore throat  Respiratory: Negative for cough  Gastrointestinal: Negative for diarrhea, nausea and vomiting  Musculoskeletal: Negative for myalgias and neck stiffness  Neurological: Negative for dizziness, facial asymmetry, light-headedness and headaches           Current Medications       Current Outpatient Medications:     ascorbic acid (VITAMIN C) 500 mg tablet, Take by mouth, Disp: , Rfl:     aspirin (ECOTRIN LOW STRENGTH) 81 mg EC tablet, Take 81 mg by mouth daily, Disp: , Rfl:     cetirizine (ZyrTEC) 10 mg tablet, Take 10 mg by mouth daily, Disp: , Rfl:     desogestrel-ethinyl estradiol (APRI) 0 15-30 MG-MCG per tablet, Take 1 tablet by mouth daily, Disp: , Rfl:     fluticasone (FLONASE) 50 mcg/act nasal spray, 2 sprays into each nostril daily, Disp: , Rfl:     MULTIPLE VITAMINS-MINERALS PO, Take 1 tablet by mouth daily, Disp: , Rfl:     sotalol (BETAPACE) 120 mg tablet, Take 1 tablet by mouth 2 (two) times a day, Disp: , Rfl:     cephalexin (KEFLEX) 500 mg capsule, Take 1 capsule (500 mg total) by mouth every 6 (six) hours for 7 days, Disp: 28 capsule, Rfl: 0    Diclofenac Sodium (Voltaren) 1 %, Apply 2 g topically 4 (four) times a day (Patient not taking: Reported on 8/25/2021), Disp: 150 g, Rfl: 0    hyoscyamine (LEVSIN/SL) 0 125 mg SL tablet, Take 1 tablet (0 125 mg total) by mouth every 6 (six) hours as needed for cramping (Patient not taking: Reported on 5/25/2021), Disp: 20 tablet, Rfl: 1    meclizine (ANTIVERT) 25 mg tablet, Take 25 mg by mouth as needed  (Patient not taking: Reported on 8/25/2021), Disp: , Rfl:     mometasone (ELOCON) 0 1 % cream, APPLY TO AFFECTED AREA(S) TWO TIMES A DAY FOR 2 WEEKS THEN STOP FOR 1 WEEK THEN REPEAT AS NEEDED FOR ECZEMA (Patient not taking: Reported on 8/25/2021), Disp: , Rfl:     Current Allergies     Allergies as of 08/25/2021 - Reviewed 08/25/2021   Allergen Reaction Noted    Enalapril  04/21/2012    Other  02/27/2009    Penicillins Hives 06/19/2012              Past Medical History:   Diagnosis Date    Atrial fibrillation ([facility] [address] )     last assessed 03/14/2014    BRCA1 negative     BRCA2 negative     Complete transposition of great vessels     last assessed 02/14/2014    Eczema     GERD (gastroesophageal reflux disease)     History of cardiac cath     Cath RVOT subpulmonary obstruction, last assessed 06/19/2012    Hyperbilirubinemia (congenital)     Migraine     SSS (sick sinus syndrome) ([facility] [address] [facility]     Torsion of small intestine ([facility] [address] )        Past Surgical History:   Procedure Laterality Date    ADENOIDECTOMY      onset childhood    ATRIAL SEPTECTOMY      balloon transvenous method    CARDIAC PACEMAKER PLACEMENT      last assessed 03/14/2014    LAPAROSCOPIC LYSIS INTESTINAL ADHESIONS      OOPHORECTOMY      OTHER SURGICAL HISTORY      transposition repair mustard procedure, description 8 months    TONSILLECTOMY      TUBAL LIGATION      last assessed 08/17/2012    US GUIDED BREAST BIOPSY RIGHT COMPLETE Right 11/18/2019       Family History   Problem Relation Age of Onset    Cancer Father     Colon cancer Sister 25        carcinoid tumor in the rectum    Heart disease Maternal Grandmother     Hyperlipidemia Maternal Grandmother     Diabetes Paternal Grandmother     Hypertension Paternal Grandmother     Lung cancer Paternal Grandmother     Heart disease Paternal Grandfather     Hyperlipidemia Paternal Grandfather     Lung cancer Paternal Grandfather     No Known Problems Mother     No Known Problems Daughter     Cancer Maternal Grandfather         tongue    Breast cancer Paternal Aunt 46    Ovarian cancer Paternal Aunt 27    No Known Problems Paternal Aunt     No Known Problems Maternal Aunt          Medications have been verified      The following portions of the patient's history were reviewed and updated as appropriate: allergies, current medications, past family history, past medical history, past social history, past surgical history and problem list     Objective   /57   Pulse 71   Temp 98 8 °F (37 1 °C) (Tympanic)   Resp 16   Ht 5' 3" (1 6 m)   Wt 63 9 kg (140 lb 12 8 oz)   SpO2 98%   BMI 24 94 kg/m²      Physical Exam     Physical Exam  Vitals and nursing note reviewed  Constitutional:       General: She is not in acute distress  Appearance: Normal appearance  She is not ill-appearing, toxic-appearing or diaphoretic  HENT:      Head:      Comments: Left TM not visible due to cerumen  No pain with manipulation of the pinna or the tragus  Full sensation light touch over the pre-auricular area  No mastoid tenderness  Right TM is clear with clear fluid behind  No erythema or bulging  Eyes:      Extraocular Movements: Extraocular movements intact  Pupils: Pupils are equal, round, and reactive to light  Cardiovascular:      Rate and Rhythm: Normal rate and regular rhythm  Pulmonary:      Effort: Pulmonary effort is normal  No respiratory distress  Musculoskeletal:      Cervical back: Normal range of motion  Lymphadenopathy:      Cervical: No cervical adenopathy  Skin:     Capillary Refill: Capillary refill takes less than 2 seconds  Neurological:      General: No focal deficit present  Mental Status: She is alert and oriented to person, place, and time  Sensory: No sensory deficit  Motor: No weakness  Coordination: Coordination normal       Gait: Gait normal       Deep Tendon Reflexes: Reflexes normal    Psychiatric:         Mood and Affect: Mood normal          Thought Content:  Thought content normal

## 2021-08-25 NOTE — PATIENT INSTRUCTIONS
Continue over-the-counter medications  Keflex as directed   If no improvement to 3 days follow-up with PCP  If any changes or worsens follow-up sooner /go the ER

## 2021-09-16 ENCOUNTER — TELEPHONE (OUTPATIENT)
Dept: FAMILY MEDICINE CLINIC | Facility: CLINIC | Age: 41
End: 2021-09-16

## 2021-09-16 DIAGNOSIS — H66.009 NON-RECURRENT ACUTE SUPPURATIVE OTITIS MEDIA WITHOUT SPONTANEOUS RUPTURE OF TYMPANIC MEMBRANE, UNSPECIFIED LATERALITY: Primary | ICD-10-CM

## 2021-09-16 RX ORDER — DOXYCYCLINE 100 MG/1
100 CAPSULE ORAL 2 TIMES DAILY
Qty: 14 CAPSULE | Refills: 0 | Status: SHIPPED | OUTPATIENT
Start: 2021-09-16 | End: 2021-09-23

## 2021-09-16 NOTE — TELEPHONE ENCOUNTER
Pt call and says she woke up with left eye swelling red and hurts and left ear pain and wants something prescribe what can you do  Her ear she says it clog

## 2022-01-19 ENCOUNTER — TELEMEDICINE (OUTPATIENT)
Dept: FAMILY MEDICINE CLINIC | Facility: CLINIC | Age: 42
End: 2022-01-19
Payer: COMMERCIAL

## 2022-01-19 DIAGNOSIS — R10.9 ABDOMINAL PAIN, UNSPECIFIED ABDOMINAL LOCATION: ICD-10-CM

## 2022-01-19 DIAGNOSIS — R39.9 UTI SYMPTOMS: Primary | ICD-10-CM

## 2022-01-19 PROCEDURE — 99214 OFFICE O/P EST MOD 30 MIN: CPT | Performed by: PHYSICIAN ASSISTANT

## 2022-01-19 PROCEDURE — 3725F SCREEN DEPRESSION PERFORMED: CPT | Performed by: PHYSICIAN ASSISTANT

## 2022-01-19 RX ORDER — METOPROLOL SUCCINATE 25 MG/1
25 TABLET, EXTENDED RELEASE ORAL DAILY
COMMUNITY
Start: 2021-11-12

## 2022-01-19 RX ORDER — METOPROLOL SUCCINATE 25 MG/1
TABLET, EXTENDED RELEASE ORAL
COMMUNITY
Start: 2022-01-14 | End: 2022-04-05 | Stop reason: ALTCHOICE

## 2022-01-19 NOTE — PROGRESS NOTES
Virtual Regular Visit    Verification of patient location:    Patient is located in the following state in which I hold an active license [place]      Assessment/Plan:    Abdominal pain-  Will order US, and UA  UTI symptoms-  UA    Reason for visit is   Chief Complaint   Patient presents with    Pacemaker Problem     stabbing pain between belly button in where your pacemaker is  pt noticr it on sunday 1/16/22    Virtual Regular Visit        Encounter provider Jatin Boone PA-C    [facility] [id number]      Recent Visits  No visits were found meeting these conditions  Showing recent visits within past 7 days and meeting all other requirements  Today's Visits  Date Type Provider Dept   01/19/22 Telemedicine [facility]   Showing today's visits and meeting all other requirements  Future Appointments  No visits were found meeting these conditions  Showing future appointments within next 150 days and meeting all other requirements       The patient was identified by name and date of birth  Namrata Gardner was informed that this is a telemedicine visit and that the visit is being conducted through [address] [place] [address] [facility] and patient was informed that this is a secure, HIPAA-compliant platform  She agrees to proceed     My office door was closed  No one else was in the room  She acknowledged consent and understanding of privacy and security of the video platform  The patient has agreed to participate and understands they can discontinue the visit at any time  Patient is aware this is a billable service  Subjective  Namrata Gardner is a 46 y o  female   Has pain around area of pacemaker, since Sunday, daily  Worse when area is touched, or with pressure  Had pacemaker placed in abd  Area, October 2014  Has been urinating more than usual    Menses have been regular, patient takes ocp, LNMP-  12/20/2021  No vaginal discharge  Denies doing heavy lifting, and has not noticed any bulges in area  History of abdominal obstruction  Past Medical History:   Diagnosis Date    Atrial fibrillation ([facility] [address] )     last assessed 03/14/2014    BRCA1 negative     BRCA2 negative     Complete transposition of great vessels     last assessed 02/14/2014    Eczema     GERD (gastroesophageal reflux disease)     History of cardiac cath     Cath RVOT subpulmonary obstruction, last assessed 06/19/2012    Hyperbilirubinemia (congenital)     Migraine     SSS (sick sinus syndrome) ([facility] [address]     Torsion of small intestine (HCC)        Past Surgical History:   Procedure Laterality Date    ADENOIDECTOMY      onset childhood    ATRIAL SEPTECTOMY      balloon transvenous method    CARDIAC PACEMAKER PLACEMENT      last assessed 03/14/2014    LAPAROSCOPIC LYSIS INTESTINAL ADHESIONS      OOPHORECTOMY      OTHER SURGICAL HISTORY      transposition repair mustard procedure, description 11 months    TONSILLECTOMY      TUBAL LIGATION      last assessed 08/17/2012    US GUIDED BREAST BIOPSY RIGHT COMPLETE Right 11/18/2019       Current Outpatient Medications   Medication Sig Dispense Refill    ascorbic acid (VITAMIN C) 500 mg tablet Take by mouth      aspirin (ECOTRIN LOW STRENGTH) 81 mg EC tablet Take 81 mg by mouth daily      cetirizine (ZyrTEC) 10 mg tablet Take 10 mg by mouth daily      desogestrel-ethinyl estradiol (APRI) 0 15-30 MG-MCG per tablet Take 1 tablet by mouth daily      metoprolol tartrate (LOPRESSOR) 25 mg tablet Take 25 mg by mouth in the morning      MULTIPLE VITAMINS-MINERALS PO Take 1 tablet by mouth daily      sotalol (BETAPACE) 120 mg tablet Take 1 tablet by mouth 2 (two) times a day      fluticasone (FLONASE) 50 mcg/act nasal spray 2 sprays into each nostril daily (Patient not taking: Reported on 1/19/2022 )       No current facility-administered medications for this visit          Allergies Allergen Reactions    Enalapril      Reaction Date: [id number];   Pt denies 2/2018  Patient stated it may interact with sotalol   Other      Other reaction(s): Other  Tongue feels funny    Penicillins Hives     Category: Allergy; Review of Systems   Constitutional: Positive for fatigue  Negative for appetite change, chills, diaphoresis and fever  Respiratory: Negative for cough, chest tightness and shortness of breath  Gastrointestinal: Positive for abdominal pain  Negative for constipation, diarrhea, nausea and vomiting  Genitourinary: Positive for flank pain and frequency  Negative for dysuria, menstrual problem, vaginal bleeding and vaginal discharge  Musculoskeletal: Positive for back pain  Negative for arthralgias, myalgias, neck pain and neck stiffness  Video Exam    There were no vitals filed for this visit  Physical Exam  Constitutional:       General: She is not in acute distress  Appearance: Normal appearance  She is not ill-appearing, toxic-appearing or diaphoretic  Pulmonary:      Effort: Pulmonary effort is normal    Abdominal:      General: There is distension  Comments: Abd  Appears bloated/possibly distended, surgical scar noted next to umbilical area, to the right  Neurological:      General: No focal deficit present  Mental Status: She is alert and oriented to person, place, and time  Psychiatric:         Behavior: Behavior normal          Thought Content: Thought content normal          Judgment: Judgment normal           I spent 20 minutes directly with the patient during this visit    VIRTUAL VISIT [id number] verbally agrees to participate in [employer]   Pt is aware that [employer] could be limited without vital signs or the ability to perform a full hands-on physical [place] [address] understands she or the provider may request at any time to terminate the video visit and request the patient to seek care or treatment in person

## 2022-01-21 ENCOUNTER — HOSPITAL ENCOUNTER (OUTPATIENT)
Dept: ULTRASOUND IMAGING | Facility: HOSPITAL | Age: 42
Discharge: HOME | End: 2022-01-21
Payer: COMMERCIAL

## 2022-01-21 DIAGNOSIS — R10.9 ABDOMINAL PAIN, UNSPECIFIED ABDOMINAL LOCATION: ICD-10-CM

## 2022-01-21 PROCEDURE — 76705 ECHO EXAM OF ABDOMEN: CPT

## 2022-04-05 ENCOUNTER — OFFICE VISIT (OUTPATIENT)
Dept: FAMILY MEDICINE CLINIC | Facility: CLINIC | Age: 42
End: 2022-04-05
Payer: COMMERCIAL

## 2022-04-05 VITALS
WEIGHT: 140 LBS | OXYGEN SATURATION: 96 % | HEIGHT: 63 IN | BODY MASS INDEX: 24.8 KG/M2 | HEART RATE: 88 BPM | TEMPERATURE: 98.9 F | DIASTOLIC BLOOD PRESSURE: 70 MMHG | SYSTOLIC BLOOD PRESSURE: 114 MMHG

## 2022-04-05 DIAGNOSIS — Z13.71 BRCA GENE MUTATION NEGATIVE IN FEMALE: ICD-10-CM

## 2022-04-05 DIAGNOSIS — Q20.3 COMPLETE TRANSPOSITION OF GREAT VESSELS: ICD-10-CM

## 2022-04-05 DIAGNOSIS — I49.5 SINOATRIAL NODE DYSFUNCTION (HCC): ICD-10-CM

## 2022-04-05 DIAGNOSIS — I42.9 CARDIOMYOPATHY, UNSPECIFIED TYPE (HCC): ICD-10-CM

## 2022-04-05 DIAGNOSIS — N63.11 MASS OF UPPER OUTER QUADRANT OF RIGHT BREAST: ICD-10-CM

## 2022-04-05 DIAGNOSIS — M72.2 PLANTAR FASCIITIS, BILATERAL: ICD-10-CM

## 2022-04-05 DIAGNOSIS — Z00.00 WELL ADULT EXAM: Primary | ICD-10-CM

## 2022-04-05 PROCEDURE — 99395 PREV VISIT EST AGE 18-39: CPT | Performed by: FAMILY MEDICINE

## 2022-04-05 PROCEDURE — 1036F TOBACCO NON-USER: CPT | Performed by: FAMILY MEDICINE

## 2022-04-05 PROCEDURE — 99214 OFFICE O/P EST MOD 30 MIN: CPT | Performed by: FAMILY MEDICINE

## 2022-04-05 PROCEDURE — 3008F BODY MASS INDEX DOCD: CPT | Performed by: FAMILY MEDICINE

## 2022-04-05 NOTE — PATIENT INSTRUCTIONS
Schedule mammogram - diagnostic right   Schedule ultrasound right breast   Fasting blood work    Plantar Fasciitis   AMBULATORY CARE:   Plantar fasciitis  PF is swelling of the plantar fascia  The plantar fascia is a thick band of tissue that connects your heel bone to your toes  This part of your foot helps support the arch of your foot and absorbs shock  Tension and stress can cause small tears on the thick band of tissue  These small tears can grow larger with repeated stretching and tearing  The band of tissue can become swollen and painful  Signs and symptoms:   · Pain on the bottom of your foot near your heel    · Pain that is worse right after you get out of bed or after a long period of rest    · Pain after activity    · Stiffness in your foot    · Heel swelling    Call your doctor if:   · Your pain or swelling suddenly increase  · You develop knee, hip, or back pain  · You have questions or concerns about your condition or care  Treatment  may include any of the following:  · Medicines  may be given to decrease swelling and pain  · Shoe inserts, splints, or tape  help support your foot and decrease stress on your plantar fascia  A night splint may help stretch your plantar fascia while you sleep  · Stretches and exercises  can help decrease pain and swelling  They can also help strengthen the muscles that support your heel and foot  · Surgery  may be needed when other treatments do not work  During surgery, the plantar fascia is  from your heel  Self-care:   · Wear your splint or shoe inserts as directed  You may need to wear a splint at night to keep your foot stretched while you sleep  This will help prevent sharp pain first thing in the morning  Shoe inserts will help decrease stress on your plantar fascia when you walk or exercise  · Apply ice on your plantar fascia  Ice helps prevent tissue damage and decreases swelling and pain   Fill a water bottle with water and freeze it  Wrap a towel around the bottle or cover it with a pillow case  Roll the water bottle under your foot for 10 minutes in the morning and after work  · Massage your plantar fascia as directed  This may help decrease swelling and pain  Roll a golf ball under your foot for 10 minutes  Repeat 3 times each day  · Go to physical therapy as directed  A physical therapist teaches you exercises to help improve movement and strength, and to decrease pain  Prevent plantar fasciitis:   · Maintain a healthy weight  This will help decrease stress on your feet  Ask your healthcare provider how much you should weigh  Ask him to help you create a weight loss plan if you are overweight  · Do low-impact exercises  Low-impact exercises decrease stress on your plantar fascia  Examples include swimming or bicycling  · Start new activities slowly  Increase the intensity and time gradually  · Wear shoes that fit well and support your arch  Replace your shoes before the padding or shock absorption wears out  Do not walk or  bare feet or sandals for long periods of time  Follow up with your doctor as directed:  Write down your questions so you remember to ask them during your visits  © Copyright [employer] 2022 Information is for End User's use only and may not be sold, redistributed or otherwise used for commercial purposes  All illustrations and images included in CareNotes® are the copyrighted property of A D A M , Inc  or [facility] Fatuma Aquino   The above information is an  only  It is not intended as medical advice for individual conditions or treatments  Talk to your doctor, nurse or pharmacist before following any medical regimen to see if it is safe and effective for you  Wellness Visit for Adults   AMBULATORY CARE:   A wellness visit  is when you see your healthcare provider to get screened for health problems   Your healthcare provider will also give you advice on how to stay healthy  Write down your questions so you remember to ask them  Ask your healthcare provider how often you should have a wellness visit  What happens at a wellness visit:  Your healthcare provider will ask about your health, and your family history of health problems  This includes high blood pressure, heart disease, and cancer  He or she will ask if you have symptoms that concern you, if you smoke, and about your mood  You may also be asked about your intake of medicines, supplements, food, and alcohol  Any of the following may be done:  · Your weight  will be checked  Your height may also be checked so your body mass index (BMI) can be calculated  Your BMI shows if you are at a healthy weight  · Your blood pressure  and heart rate will be checked  Your temperature may also be checked  · Blood and urine tests  may be done  Blood tests may be done to check your cholesterol levels  Abnormal cholesterol levels increase your risk for heart disease and stroke  You may also need a blood or urine test to check for diabetes if you are at increased risk  Urine tests may be done to look for signs of an infection or kidney disease  · A physical exam  includes checking your heartbeat and lungs with a stethoscope  Your healthcare provider may also check your skin to look for sun damage  · Screening tests  may be recommended  A screening test is done to check for diseases that may not cause symptoms  The screening tests you may need depend on your age, gender, family history, and lifestyle habits  For example, colorectal screening may be recommended if you are 48years old or older  Screening tests you need if you are a woman:   · A Pap smear  is used to screen for cervical cancer  Pap smears are usually done every 3 to 5 years depending on your age   You may need them more often if you have had abnormal Pap smear test results in the past  Ask your healthcare provider how often you should have a Pap smear     · A mammogram  is an x-ray of your breasts to screen for breast cancer  Experts recommend mammograms every 2 years starting at age 48 years  You may need a mammogram at age 52 years or younger if you have an increased risk for breast cancer  Talk to your healthcare provider about when you should start having mammograms and how often you need them  Vaccines you may need:   · Get an influenza vaccine  every year  The influenza vaccine protects you from the flu  Several types of viruses cause the flu  The viruses change over time, so new vaccines are made each year  · Get a tetanus-diphtheria (Td) booster vaccine  every 10 years  This vaccine protects you against tetanus and diphtheria  Tetanus is a severe infection that may cause painful muscle spasms and lockjaw  Diphtheria is a severe bacterial infection that causes a thick covering in the back of your mouth and throat  · Get a human papillomavirus (HPV) vaccine  if you are female and aged 23 to 32 or male 23 to 24 and never received it  This vaccine protects you from HPV infection  HPV is the most common infection spread by sexual contact  HPV may also cause vaginal, penile, and anal cancers  · Get a pneumococcal vaccine  if you are aged 72 years or older  The pneumococcal vaccine is an injection given to protect you from pneumococcal disease  Pneumococcal disease is an infection caused by pneumococcal bacteria  The infection may cause pneumonia, meningitis, or an ear infection  · Get a shingles vaccine  if you are 60 or older, even if you have had shingles before  The shingles vaccine is an injection to protect you from the varicella-zoster virus  This is the same virus that causes chickenpox  Shingles is a painful rash that develops in people who had chickenpox or have been exposed to the virus  How to eat healthy:  My Plate is a model for planning healthy meals  It shows the types and amounts of foods that should go on your plate  Fruits and vegetables make up about half of your plate, and grains and protein make up the other half  A serving of dairy is included on the side of your plate  The amount of calories and serving sizes you need depends on your age, gender, weight, and height  Examples of healthy foods are listed below:  · Eat a variety of vegetables  such as dark green, red, and orange vegetables  You can also include canned vegetables low in sodium (salt) and frozen vegetables without added butter or sauces  · Eat a variety of fresh fruits , canned fruit in 100% juice, frozen fruit, and dried fruit  · Include whole grains  At least half of the grains you eat should be whole grains  Examples include whole-wheat bread, wheat pasta, brown rice, and whole-grain cereals such as oatmeal     · Eat a variety of protein foods such as seafood (fish and shellfish), lean meat, and poultry without skin (turkey and chicken)  Examples of lean meats include pork leg, shoulder, or tenderloin, and beef round, sirloin, tenderloin, and extra lean ground beef  Other protein foods include eggs and egg substitutes, beans, peas, soy products, nuts, and seeds  · Choose low-fat dairy products such as skim or 1% milk or low-fat yogurt, cheese, and cottage cheese  · Limit unhealthy fats  such as butter, hard margarine, and shortening  Exercise:  Exercise at least 30 minutes per day on most days of the week  Some examples of exercise include walking, biking, dancing, and swimming  You can also fit in more physical activity by taking the stairs instead of the elevator or parking farther away from stores  Include muscle strengthening activities 2 days each week  Regular exercise provides many health benefits  It helps you manage your weight, and decreases your risk for type 2 diabetes, heart disease, stroke, and high blood pressure  Exercise can also help improve your mood  Ask your healthcare provider about the best exercise plan for you  General health and safety guidelines:   · Do not smoke  Nicotine and other chemicals in cigarettes and cigars can cause lung damage  Ask your healthcare provider for information if you currently smoke and need help to quit  E-cigarettes or smokeless tobacco still contain nicotine  Talk to your healthcare provider before you use these products  · Limit alcohol  A drink of alcohol is 12 ounces of beer, 5 ounces of wine, or 1½ ounces of liquor  · Lose weight, if needed  Being overweight increases your risk of certain health conditions  These include heart disease, high blood pressure, type 2 diabetes, and certain types of cancer  · Protect your skin  Do not sunbathe or use tanning beds  Use sunscreen with a SPF 15 or higher  Apply sunscreen at least 15 minutes before you go outside  Reapply sunscreen every 2 hours  Wear protective clothing, hats, and sunglasses when you are outside  · Drive safely  Always wear your seatbelt  Make sure everyone in your car wears a seatbelt  A seatbelt can save your life if you are in an accident  Do not use your cell phone when you are driving  This could distract you and cause an accident  Pull over if you need to make a call or send a text message  · Practice safe sex  Use latex condoms if are sexually active and have more than one partner  Your healthcare provider may recommend screening tests for sexually transmitted infections (STIs)  · Wear helmets, lifejackets, and protective gear  Always wear a helmet when you ride a bike or motorcycle, go skiing, or play sports that could cause a head injury  Wear protective equipment when you play sports  Wear a lifejacket when you are on a boat or doing water sports  © Copyright [employer] 2022 Information is for End User's use only and may not be sold, redistributed or otherwise used for commercial purposes   All illustrations and images included in CareNotes® are the copyrighted property of [employer]  or Deepthi Rand  The above information is an  only  It is not intended as medical advice for individual conditions or treatments  Talk to your doctor, nurse or pharmacist before following any medical regimen to see if it is safe and effective for you

## 2022-04-05 NOTE — PROGRESS NOTES
Assessment/Plan:      1  Well adult exam    2  Mass of upper outer quadrant of right breast  -     Mammo diagnostic right w 3d & cad; Future; Expected date: 04/05/2022  -     US breast right limited (diagnostic); Future; Expected date: 04/05/2022    3  Plantar fasciitis, bilateral  Assessment & Plan:  Discussed shoe wear, proper arch support, stretching  voltaren gel      4  Cardiomyopathy, unspecified type [facility])  Assessment & Plan:  Stable, continue follow up with cardiology      5  Sinoatrial node dysfunction (HCC)    6  Complete transposition of great vessels    7  BRCA gene mutation negative in female        Subjective:  Chief Complaint   Patient presents with    Pain     pt c/o right side breast pain that started about a week in a half ago        Patient ID: Holly Snow is a 46 y o  female  Complains of pain in right breast intermittent for 1 1/2 weeks  With pressure - causes pain  Had evaluation - genetic testing and negative for braca gene  Breast and ovarian cancer-  paternal aunt   Pt also complains of heel pain  Review of Systems   Constitutional: Negative  Negative for fatigue and fever  HENT: Negative  Eyes: Negative  Respiratory: Negative  Negative for cough  Cardiovascular: Negative  Gastrointestinal: Negative  Endocrine: Negative  Genitourinary:        Breast pain- right    Musculoskeletal: Positive for gait problem  Heel pain   Skin: Negative  Allergic/Immunologic: Negative  Psychiatric/Behavioral: Negative  The following portions of the patient's history were reviewed and updated as appropriate: allergies, current medications, past family history, past medical history, past social history, past surgical history and problem list     Objective:  Vitals:    04/05/22 1656   BP: 114/70   Pulse: 88   Temp: 98 9 °F (37 2 °C)   SpO2: 96%   Weight: 63 5 kg (140 lb)   Height: 5' 3" (1 6 m)      Physical Exam  Vitals and nursing note reviewed  Constitutional:       Appearance: She is well-developed  HENT:      Head: Normocephalic and atraumatic  Cardiovascular:      Rate and Rhythm: Normal rate and regular rhythm  Heart sounds: Normal heart sounds  Pulmonary:      Effort: Pulmonary effort is normal       Breath sounds: Normal breath sounds  Abdominal:      General: Bowel sounds are normal       Palpations: Abdomen is soft  Musculoskeletal:         General: Tenderness present  Comments: Heel pain  Skin:     General: Skin is warm and dry  Neurological:      Mental Status: She is alert and oriented to person, place, and time  Psychiatric:         Behavior: Behavior normal          Thought Content:  Thought content normal          Judgment: Judgment normal

## 2022-04-05 NOTE — PROGRESS NOTES
Jayesh Biggs is a 46 y o   female and is here for routine health maintenance  The patient reports right sided breast pain about 1 1/2 weeks ago  History of Present Illness     Pt seen for physical  Complains of right sided breast pain for about 1 1/2 weeks  Well Adult Physical   Patient here for a comprehensive physical exam       Diet and Physical Activity  Diet: well balanced diet  Weight concerns: None, patient's BMI is between 18 5-24 9  Exercise: frequently      Depression Screen  PHQ-2/9 Depression Screening            General Health  Hearing: Normal:  bilateral  Vision: no vision problems  Dental: regular dental visits     History:  LMP: No LMP recorded  Cancer Screening  Colononoscopy not indicated  Mammogram - diagnostic mammogram  Pap up to date   Abnormal pap? no  Smoker NO Annual screening with low-dose helical computed tomography (CT) for patients age 54 to 76 years with history of smoking at least 30 pack-years and, if a former smoker, had quit within the previous 15 years      The following portions of the patient's history were reviewed and updated as appropriate: allergies, current medications, past family history, past medical history, past social history, past surgical history and problem list     Review of Systems     Review of Systems   Constitutional: Negative  Negative for fatigue and fever  HENT: Negative  Eyes: Negative  Respiratory: Negative  Negative for cough  Cardiovascular: Negative  Gastrointestinal: Negative  Endocrine: Negative  Genitourinary: Negative  Musculoskeletal:        Breast pain right side   Skin: Negative  Allergic/Immunologic: Negative  Neurological: Negative  Psychiatric/Behavioral: Negative          Past Medical History     Past Medical History:   Diagnosis Date    Atrial fibrillation [facility])     last assessed 03/14/2014    BRCA1 negative     BRCA2 negative     Complete transposition of great vessels     last assessed 02/14/2014    Eczema     GERD (gastroesophageal reflux disease)     History of cardiac cath     Cath RVOT subpulmonary obstruction, last assessed 06/19/2012    Hyperbilirubinemia (congenital)     Migraine     SSS [facility]sick sinus syndrome) ([facility] [address] )     Torsion of small intestine (HCC)        Past Surgical History     Past Surgical History:   Procedure Laterality Date    ADENOIDECTOMY      onset childhood    ATRIAL SEPTECTOMY      balloon transvenous method    CARDIAC PACEMAKER PLACEMENT      last assessed 03/14/2014    LAPAROSCOPIC LYSIS INTESTINAL ADHESIONS      OOPHORECTOMY      OTHER SURGICAL HISTORY      transposition repair mustard procedure, description 6 months    TONSILLECTOMY      TUBAL LIGATION      last assessed 08/17/2012    US GUIDED BREAST BIOPSY RIGHT COMPLETE Right 11/18/2019       Social History     Social History     Socioeconomic History    Marital status: /Civil Union     Spouse name: None    Number of children: None    Years of education: None    Highest education level: None   Occupational History    None   Tobacco Use    Smoking status: Never Smoker    Smokeless tobacco: Never Used   Vaping Use    Vaping Use: Never used   Substance and Sexual Activity    Alcohol use: Yes     Comment: social    Drug use: No    Sexual activity: None   Other Topics Concern    None   Social History Narrative    None     Social Determinants of Health     Financial Resource Strain: Not on file   Food Insecurity: Not on file   Transportation Needs: Not on file   Physical Activity: Not on file   Stress: Not on file   Social Connections: Not on file   Intimate Partner Violence: Not on file   Housing Stability: Not on file       Family History     Family History   Problem Relation Age of Onset    Cancer Father     Colon cancer Sister 25        carcinoid tumor in the rectum    Heart disease Maternal Grandmother     Hyperlipidemia Maternal Grandmother     Diabetes Paternal Grandmother     Hypertension Paternal Grandmother     Lung cancer Paternal Grandmother     Heart disease Paternal Grandfather     Hyperlipidemia Paternal Grandfather     Lung cancer Paternal Grandfather     No Known Problems Mother     No Known Problems Daughter     Cancer Maternal Grandfather         tongue    Breast cancer Paternal Aunt 46    Ovarian cancer Paternal Aunt 27    No Known Problems Paternal Aunt     No Known Problems Maternal Aunt        Current Medications       Current Outpatient Medications:     ascorbic acid (VITAMIN C) 500 mg tablet, Take by mouth, Disp: , Rfl:     aspirin (ECOTRIN LOW STRENGTH) 81 mg EC tablet, Take 81 mg by mouth daily, Disp: , Rfl:     cetirizine (ZyrTEC) 10 mg tablet, Take 10 mg by mouth daily, Disp: , Rfl:     Cholecalciferol 25 MCG (1000 UT) tablet, Take 1,000 Units by mouth daily, Disp: , Rfl:     desogestrel-ethinyl estradiol (APRI) 0 15-30 MG-MCG per tablet, Take 1 tablet by mouth daily, Disp: , Rfl:     metoprolol succinate (TOPROL-XL) 25 mg 24 hr tablet, Take 25 mg by mouth daily, Disp: , Rfl:     MULTIPLE VITAMINS-MINERALS PO, Take 1 tablet by mouth daily, Disp: , Rfl:     sotalol (BETAPACE) 120 mg tablet, Take 1 tablet by mouth 2 (two) times a day, Disp: , Rfl:      Allergies     Allergies   Allergen Reactions    Enalapril      Reaction Date: [id number];   Pt denies 2/2018  Patient stated it may interact with sotalol   Other      Other reaction(s): Other  Tongue feels funny    Penicillins Hives     Category: Allergy;        Objective     /70   Pulse 88   Temp 98 9 °F (37 2 °C)   Ht 5' 3" (1 6 m)   Wt 63 5 kg (140 lb)   SpO2 96%   BMI 24 80 kg/m²      Physical Exam  Vitals and nursing note reviewed  Constitutional:       Appearance: She is well-developed  HENT:      Head: Normocephalic and atraumatic        Right Ear: External ear normal       Left Ear: External ear normal       Nose: Nose normal    Eyes: Conjunctiva/sclera: Conjunctivae normal       Pupils: Pupils are equal, round, and reactive to light  Cardiovascular:      Rate and Rhythm: Normal rate and regular rhythm  Heart sounds: Murmur heard  Pulmonary:      Effort: Pulmonary effort is normal       Breath sounds: Normal breath sounds  Chest:   Breasts:      Right: Mass and tenderness present  No axillary adenopathy  Abdominal:      General: Bowel sounds are normal       Palpations: Abdomen is soft  Musculoskeletal:      Cervical back: Normal range of motion and neck supple  Lymphadenopathy:      Upper Body:      Right upper body: No axillary adenopathy  Skin:     General: Skin is warm and dry  Neurological:      Mental Status: She is alert and oriented to person, place, and time  Psychiatric:         Behavior: Behavior normal          Thought Content: Thought content normal          Judgment: Judgment normal            No exam data present    Health Maintenance     Health Maintenance   Topic Date Due    Hepatitis C Screening  Never done    COVID-19 Vaccine (1) Never done    Pneumococcal Vaccine: Pediatrics (0 to 5 Years) and At-Risk Patients (6 to 59 Years) (1 of 2 - PPSV23) Never done    HIV Screening  Never done    Annual Physical  05/11/2021    Influenza Vaccine (1) Never done    Cervical Cancer Screening  09/01/2022    Depression Screening  01/19/2023    BMI: Adult  04/05/2023    DTaP,Tdap,and Td Vaccines (2 - Td or Tdap) 04/08/2024    HIB Vaccine  Aged Out    Hepatitis B Vaccine  Aged Out    IPV Vaccine  Aged Out    Hepatitis A Vaccine  Aged Out    Meningococcal ACWY Vaccine  Aged Out    HPV Vaccine  Aged [employer] History   Administered Date(s) Administered    Tdap 04/08/2014       Assessment/Plan         1   Healthy female exam   2  Patient Counseling:   · Nutrition: Stressed importance of a well balanced diet, moderation of sodium/saturated fat, caloric balance and sufficient intake of fiber  · Exercise: Stressed the importance of regular exercise with a goal of 150 minutes per week  · Dental Health: Discussed daily flossing and brushing and regular dental visits     · Immunizations reviewed  · Discussed benefits of screening   · Discussed the patient's BMI with her  The BMI is in the acceptable range  3  Cancer Screening   4  Labs   5  Schedule mammogram- diagnostic and ultrasound right breast  6  Follow up in one year      Vivian Dutta DO

## 2022-04-10 PROBLEM — M72.2 PLANTAR FASCIITIS, BILATERAL: Status: ACTIVE | Noted: 2022-04-10

## 2022-04-10 PROBLEM — N63.11 MASS OF UPPER OUTER QUADRANT OF RIGHT BREAST: Status: ACTIVE | Noted: 2022-04-10

## 2022-04-10 PROBLEM — J01.01 ACUTE RECURRENT MAXILLARY SINUSITIS: Status: RESOLVED | Noted: 2018-01-09 | Resolved: 2022-04-10

## 2022-05-03 ENCOUNTER — HOSPITAL ENCOUNTER (OUTPATIENT)
Dept: ULTRASOUND IMAGING | Facility: CLINIC | Age: 42
Discharge: HOME | End: 2022-05-03
Payer: COMMERCIAL

## 2022-05-03 ENCOUNTER — HOSPITAL ENCOUNTER (OUTPATIENT)
Dept: MAMMOGRAPHY | Facility: CLINIC | Age: 42
Discharge: HOME | End: 2022-05-03
Payer: COMMERCIAL

## 2022-05-03 DIAGNOSIS — N63.11 MASS OF UPPER OUTER QUADRANT OF RIGHT BREAST: ICD-10-CM

## 2022-05-03 PROCEDURE — 77066 DX MAMMO INCL CAD BI: CPT

## 2022-05-03 PROCEDURE — 76642 ULTRASOUND BREAST LIMITED: CPT

## 2022-05-03 PROCEDURE — G0279 TOMOSYNTHESIS, MAMMO: HCPCS

## 2022-05-04 ENCOUNTER — TELEPHONE (OUTPATIENT)
Dept: FAMILY MEDICINE CLINIC | Facility: CLINIC | Age: 42
End: 2022-05-04

## 2022-05-04 NOTE — TELEPHONE ENCOUNTER
----- Message from Constantin Dupree DO sent at 5/4/2022 12:54 PM EDT -----  Your diagnostic mammogram is normal   Repeat your screening mammogram in 1 year

## 2022-08-16 ENCOUNTER — OFFICE VISIT (OUTPATIENT)
Dept: FAMILY MEDICINE CLINIC | Facility: CLINIC | Age: 42
End: 2022-08-16
Payer: COMMERCIAL

## 2022-08-16 VITALS
SYSTOLIC BLOOD PRESSURE: 118 MMHG | TEMPERATURE: 96 F | HEIGHT: 63 IN | HEART RATE: 74 BPM | DIASTOLIC BLOOD PRESSURE: 70 MMHG | WEIGHT: 138 LBS | BODY MASS INDEX: 24.45 KG/M2 | OXYGEN SATURATION: 98 %

## 2022-08-16 DIAGNOSIS — J02.9 PHARYNGITIS, UNSPECIFIED ETIOLOGY: Primary | ICD-10-CM

## 2022-08-16 PROCEDURE — 3725F SCREEN DEPRESSION PERFORMED: CPT | Performed by: FAMILY MEDICINE

## 2022-08-16 PROCEDURE — 99213 OFFICE O/P EST LOW 20 MIN: CPT | Performed by: FAMILY MEDICINE

## 2022-08-16 RX ORDER — CEFUROXIME AXETIL 250 MG/1
250 TABLET ORAL EVERY 12 HOURS SCHEDULED
Qty: 14 TABLET | Refills: 0 | Status: SHIPPED | OUTPATIENT
Start: 2022-08-16 | End: 2022-08-23

## 2022-08-16 NOTE — PROGRESS NOTES
Assessment/Plan:      1  Pharyngitis, unspecified etiology  Assessment & Plan:  Throat culture done today  Start cefuroxime 1 tab twice a day  Orders:  -     cefuroxime (CEFTIN) 250 mg tablet; Take 1 tablet (250 mg total) by mouth every 12 (twelve) hours for 7 days        Subjective:  Chief Complaint   Patient presents with    Sore Throat     Headache start last wed and lasted 3 days , sore throat since Friday , no fever per patient is been 98 3-98 6 , negative home covid test on Friday and yesterday both negative  Patient ID: Darinel Herrera is a 46 y o  female  Pt started with headache on Wednesday 8/10- for 2 days and resolved  Started with scratchy sore throat and headache recurred  Yesterday, throat hurt worse  Pain with swallowing, no fever  Frontal headache  Covid test on Friday, yesterday - negative both      Review of Systems   Constitutional: Negative  Negative for fatigue and fever  HENT: Positive for sore throat  Eyes: Negative  Respiratory: Negative  Negative for cough and shortness of breath  Cardiovascular: Negative  Gastrointestinal: Negative  Endocrine: Negative  Genitourinary: Negative  Musculoskeletal: Negative  Skin: Negative  Allergic/Immunologic: Negative  Neurological: Positive for headaches  Psychiatric/Behavioral: Negative  The following portions of the patient's history were reviewed and updated as appropriate: allergies, current medications, past family history, past medical history, past social history, past surgical history and problem list     Objective:  Vitals:    08/16/22 1100   BP: 118/70   Pulse: 74   Temp: (!) 96 °F (35 6 °C)   TempSrc: Tympanic   SpO2: 98%   Weight: 62 6 kg (138 lb)   Height: 5' 3" (1 6 m)      Physical Exam  Vitals and nursing note reviewed  Constitutional:       Appearance: She is well-developed  HENT:      Head: Normocephalic and atraumatic        Right Ear: Tympanic membrane normal  Left Ear: Tympanic membrane normal       Nose: Nose normal       Mouth/Throat:      Pharynx: Posterior oropharyngeal erythema present  No oropharyngeal exudate  Neck:      Comments: Anterior cervical nodes, right greater than left   Cardiovascular:      Rate and Rhythm: Normal rate and regular rhythm  Heart sounds: Normal heart sounds  Pulmonary:      Effort: Pulmonary effort is normal       Breath sounds: Normal breath sounds  Abdominal:      General: Bowel sounds are normal       Palpations: Abdomen is soft  Musculoskeletal:      Cervical back: Tenderness present  Lymphadenopathy:      Cervical: Cervical adenopathy present  Skin:     General: Skin is warm and dry  Neurological:      Mental Status: She is alert and oriented to person, place, and time  Psychiatric:         Behavior: Behavior normal          Thought Content:  Thought content normal          Judgment: Judgment normal

## 2022-08-19 LAB — B-HEM STREP SPEC QL CULT: NEGATIVE

## 2022-08-22 ENCOUNTER — TELEPHONE (OUTPATIENT)
Dept: FAMILY MEDICINE CLINIC | Facility: CLINIC | Age: 42
End: 2022-08-22

## 2022-08-22 NOTE — TELEPHONE ENCOUNTER
----- Message from Issac Miranda DO sent at 8/22/2022  6:47 AM EDT -----  Your throat culture is negative

## 2022-12-02 ENCOUNTER — OFFICE VISIT (OUTPATIENT)
Dept: FAMILY MEDICINE CLINIC | Facility: CLINIC | Age: 42
End: 2022-12-02

## 2022-12-02 VITALS
SYSTOLIC BLOOD PRESSURE: 100 MMHG | HEART RATE: 75 BPM | DIASTOLIC BLOOD PRESSURE: 68 MMHG | HEIGHT: 63 IN | TEMPERATURE: 98.7 F | OXYGEN SATURATION: 96 % | BODY MASS INDEX: 24.8 KG/M2 | WEIGHT: 140 LBS

## 2022-12-02 DIAGNOSIS — Z13.29 SCREENING FOR THYROID DISORDER: ICD-10-CM

## 2022-12-02 DIAGNOSIS — Z13.220 SCREENING FOR LIPID DISORDERS: ICD-10-CM

## 2022-12-02 DIAGNOSIS — R42 VERTIGO: ICD-10-CM

## 2022-12-02 DIAGNOSIS — H81.11 BENIGN PAROXYSMAL POSITIONAL VERTIGO OF RIGHT EAR: Primary | ICD-10-CM

## 2022-12-02 DIAGNOSIS — Z13.0 SCREENING FOR DEFICIENCY ANEMIA: ICD-10-CM

## 2022-12-02 DIAGNOSIS — Z13.29 SCREENING FOR ENDOCRINE DISORDER: ICD-10-CM

## 2022-12-02 RX ORDER — MECLIZINE HYDROCHLORIDE 25 MG/1
25 TABLET ORAL EVERY 8 HOURS PRN
Qty: 30 TABLET | Refills: 2 | Status: SHIPPED | OUTPATIENT
Start: 2022-12-02

## 2022-12-02 NOTE — PATIENT INSTRUCTIONS
Dramamine less drowsy  Meclizine 1 tab three times a day as needed   Consider vestibular therapy   Fasting blood work Vertigo   AMBULATORY CARE:   Vertigo  is a condition that causes you to feel dizzy  You may feel that you or everything around you is moving or spinning  You may also feel like you are being pulled down or toward your side  Common symptoms that may happen with vertigo:   Nausea or vomiting    Trouble with your balance    Sensitivity to light or sound    Weakness, slurred speech, problems seeing or moving, or increased sleepiness    Facial weakness and headache    Hearing loss, ear fullness or pain, or hearing ringing sounds    Fast, uncontrolled movement of your eyes    Seek care immediately if:   You have a headache and a stiff neck  You have shaking chills and a fever  You vomit over and over with no relief  You have blood, pus, or fluid coming out of your ears  You are confused  Contact your healthcare provider if:   Your symptoms do not get better with treatment  You have questions about your condition or care  Treatment for vertigo  may include resting in bed or avoid certain activities for a time  You may need to decrease or stop taking medicines that are causing your vertigo  Medicines may also be prescribed to help relieve your symptoms  Manage your symptoms:   Do not drive , walk without help, or operate heavy machinery when you are dizzy  Move slowly  when you move from one position to another position  Get up slowly from sitting or lying down  Sit or lie down right away if you feel dizzy  Drink plenty of liquids  Liquids help prevent dehydration  Ask how much liquid to drink each day and which liquids are best for you  Vestibular and balance rehabilitation therapy (VBRT)  is used to teach you exercises to improve your balance and strength  These exercises may help decrease your vertigo and improve your balance   Ask for more information about this therapy  Follow up with your doctor as directed:  Write down your questions so you remember to ask them during your visits  © Copyright "[employer]" 2022 Information is for End User's use only and may not be sold, redistributed or otherwise used for commercial purposes  All illustrations and images included in CareNotes® are the copyrighted property of [employer] Inc  or Deepthi Aquino   The above information is an  only  It is not intended as medical advice for individual conditions or treatments  Talk to your doctor, nurse or pharmacist before following any medical regimen to see if it is safe and effective for you

## 2022-12-02 NOTE — PROGRESS NOTES
Assessment/Plan:      1  Benign paroxysmal positional vertigo of right ear  Assessment & Plan:  Recurrent  Last episode 5 years ago  Pt will use meclizine 1 tab 3 times a day as needed  Consider vestibular therapy  2  Vertigo  -     meclizine (ANTIVERT) 25 mg tablet; Take 1 tablet (25 mg total) by mouth every 8 (eight) hours as needed for dizziness    3  Screening for thyroid disorder  -     TSH, 3rd generation with Free T4 reflex; Future  -     TSH, 3rd generation with Free T4 reflex    4  Screening for deficiency anemia  -     CBC and differential; Future  -     Comprehensive metabolic panel; Future  -     CBC and differential  -     Comprehensive metabolic panel    5  Screening for endocrine disorder  -     CBC and differential; Future  -     Comprehensive metabolic panel; Future  -     CBC and differential  -     Comprehensive metabolic panel    6  Screening for lipid disorders  -     Lipid panel; Future  -     Lipid panel        Subjective:  Chief Complaint   Patient presents with   • Follow-up     Vertigo issues started on Saturday, dizzy nauseas, think the room spinning,   Had happen 5 years ago, meclizine them but help,          Patient ID: Ronen Lopez is a 38 y o  female  Started Saturday night, complains of dizziness, off balance sensation  Tuesday- stood up with recurrent symptoms  Wednesday, just did not feel right  Thursday was ok with driving, turned to reach for drink and had symptoms again  This am upon wakening - had symptoms  Dizziness, some nausea  Had teeth cleaned on Monday  Some nausea, no vomiting  Last episode 5 years ago  Used meclizine  Ran out of medication  Pt states not heart related  Pt states last time she had cardiology evaluation that was negative  Review of Systems   Constitutional: Negative  Negative for fatigue and fever  HENT: Negative  Eyes: Negative  Respiratory: Negative  Negative for cough  Cardiovascular: Negative    Negative for chest pain and palpitations  Gastrointestinal: Positive for nausea  Endocrine: Negative  Genitourinary: Negative  Musculoskeletal: Negative  Skin: Negative  Allergic/Immunologic: Negative  Neurological: Positive for dizziness  Psychiatric/Behavioral: Negative  The following portions of the patient's history were reviewed and updated as appropriate: allergies, current medications, past family history, past medical history, past social history, past surgical history and problem list     Objective:  Vitals:    12/02/22 1325   BP: 100/68   Pulse: 75   Temp: 98 7 °F (37 1 °C)   SpO2: 96%   Weight: 63 5 kg (140 lb)   Height: 5' 3" (1 6 m)      Physical Exam  Vitals and nursing note reviewed  Constitutional:       Appearance: She is well-developed and well-nourished  HENT:      Head: Normocephalic and atraumatic  Cardiovascular:      Rate and Rhythm: Normal rate and regular rhythm  Pulses: Intact distal pulses  Heart sounds: Normal heart sounds  Pulmonary:      Effort: Pulmonary effort is normal       Breath sounds: Normal breath sounds  Abdominal:      General: Bowel sounds are normal       Palpations: Abdomen is soft  Skin:     General: Skin is warm and dry  Neurological:      Mental Status: She is alert and oriented to person, place, and time  Psychiatric:         Mood and Affect: Mood and affect normal          Behavior: Behavior normal          Thought Content:  Thought content normal          Judgment: Judgment normal

## 2022-12-04 PROBLEM — Z00.00 WELL ADULT EXAM: Status: RESOLVED | Noted: 2020-05-11 | Resolved: 2022-12-04

## 2022-12-04 PROBLEM — J02.9 PHARYNGITIS: Status: RESOLVED | Noted: 2022-08-16 | Resolved: 2022-12-04

## 2022-12-04 PROBLEM — R42 VERTIGO: Status: ACTIVE | Noted: 2022-12-04

## 2022-12-04 NOTE — ASSESSMENT & PLAN NOTE
Recurrent  Last episode 5 years ago  Pt will use meclizine 1 tab 3 times a day as needed  Consider vestibular therapy

## 2022-12-20 ENCOUNTER — HOSPITAL ENCOUNTER (OUTPATIENT)
Dept: ULTRASOUND IMAGING | Facility: HOSPITAL | Age: 42
Discharge: HOME | End: 2022-12-20

## 2022-12-20 DIAGNOSIS — N91.2 ABSENCE OF MENSTRUATION: ICD-10-CM

## 2022-12-30 ENCOUNTER — NURSE TRIAGE (OUTPATIENT)
Dept: OTHER | Facility: OTHER | Age: 42
End: 2022-12-30

## 2022-12-30 ENCOUNTER — OFFICE VISIT (OUTPATIENT)
Dept: URGENT CARE | Facility: CLINIC | Age: 42
End: 2022-12-30

## 2022-12-30 ENCOUNTER — TELEPHONE (OUTPATIENT)
Dept: FAMILY MEDICINE CLINIC | Facility: CLINIC | Age: 42
End: 2022-12-30

## 2022-12-30 VITALS
SYSTOLIC BLOOD PRESSURE: 118 MMHG | RESPIRATION RATE: 16 BRPM | TEMPERATURE: 97.8 F | DIASTOLIC BLOOD PRESSURE: 60 MMHG | HEART RATE: 81 BPM | OXYGEN SATURATION: 96 %

## 2022-12-30 DIAGNOSIS — J01.41 ACUTE RECURRENT PANSINUSITIS: Primary | ICD-10-CM

## 2022-12-30 DIAGNOSIS — J01.00 ACUTE NON-RECURRENT MAXILLARY SINUSITIS: Primary | ICD-10-CM

## 2022-12-30 RX ORDER — DOXYCYCLINE 100 MG/1
100 CAPSULE ORAL 2 TIMES DAILY
Qty: 20 CAPSULE | Refills: 0 | Status: SHIPPED | OUTPATIENT
Start: 2022-12-30 | End: 2023-01-09

## 2022-12-30 RX ORDER — AMOXICILLIN AND CLAVULANATE POTASSIUM 875; 125 MG/1; MG/1
1 TABLET, FILM COATED ORAL EVERY 12 HOURS SCHEDULED
Qty: 14 TABLET | Refills: 0 | Status: SHIPPED | OUTPATIENT
Start: 2022-12-30 | End: 2023-01-06

## 2022-12-30 NOTE — TELEPHONE ENCOUNTER
Pt went to urgent care for a sinus infection  They gave her augmentin  She called us because she wanted to let us know she has reactions to that medication  Can we call in something different for her

## 2022-12-30 NOTE — PATIENT INSTRUCTIONS
Take antibiotic as directed until completed  Motrin and/or Tylenol as needed for any fever pain  Follow up with PCP in 3-5 days  Proceed to  ER if symptoms worsen  Sinusitis   AMBULATORY CARE:   Sinusitis  is inflammation or infection of your sinuses  Sinusitis is most often caused by a virus  Acute sinusitis may last up to 12 weeks  Chronic sinusitis lasts longer than 12 weeks  Recurrent sinusitis means you have 4 or more infections in 1 year  Common signs and symptoms:   Fever    Pain, pressure, redness, or swelling around the forehead, cheeks, or eyes    Thick yellow or green discharge from your nose    Tenderness when you touch your face over your sinuses    Dry cough that happens mostly at night or when you lie down    Headache and face pain that is worse when you lean forward    Tooth pain, or pain when you chew    Seek care immediately if:   You have trouble breathing or wheezing that is getting worse  You have a stiff neck, a fever, or a bad headache  You cannot open your eye  Your eyeball bulges out or you cannot move your eye  You are more sleepy than normal, or you notice changes in your ability to think, move, or talk  You have swelling of your forehead or scalp  Call your doctor if:   You have vision changes, such as double vision  Your eye and eyelid are red, swollen, and painful  Your symptoms do not improve or go away after 10 days  You have nausea and are vomiting  Your nose is bleeding  You have questions or concerns about your condition or care  Medicines: Your symptoms may go away on their own  Your healthcare provider may recommend watchful waiting for up to 10 days before starting antibiotics  You may need any of the following:  Acetaminophen  decreases pain and fever  It is available without a doctor's order  Ask how much to take and how often to take it  Follow directions   Read the labels of all other medicines you are using to see if they also contain acetaminophen, or ask your doctor or pharmacist  Acetaminophen can cause liver damage if not taken correctly  Do not use more than 4 grams (4,000 milligrams) total of acetaminophen in one day  NSAIDs , such as ibuprofen, help decrease swelling, pain, and fever  This medicine is available with or without a doctor's order  NSAIDs can cause stomach bleeding or kidney problems in certain people  If you take blood thinner medicine, always ask your healthcare provider if NSAIDs are safe for you  Always read the medicine label and follow directions  Nasal steroid sprays  may help decrease inflammation in your nose and sinuses  Decongestants  help reduce swelling and drain mucus in the nose and sinuses  They may help you breathe easier  Antihistamines  help dry mucus in the nose and relieve sneezing  Antibiotics  help treat or prevent a bacterial infection  Self-care:   Rinse your sinuses as directed  Use a sinus rinse device to rinse your nasal passages with a saline (salt water) solution or distilled water  Do not use tap water  This will help thin the mucus in your nose and rinse away pollen and dirt  It will also help reduce swelling so you can breathe normally  Use a humidifier  to increase air moisture in your home  This may make it easier for you to breathe and help decrease your cough  Sleep with your head elevated  Place an extra pillow under your head before you go to sleep to help your sinuses drain  Drink liquids as directed  Ask your healthcare provider how much liquid to drink each day and which liquids are best for you  Liquids will thin the mucus in your nose and help it drain  Avoid drinks that contain alcohol or caffeine  Do not smoke, and avoid secondhand smoke  Nicotine and other chemicals in cigarettes and cigars can make your symptoms worse  Ask your healthcare provider for information if you currently smoke and need help to quit   E-cigarettes or smokeless tobacco still contain nicotine  Talk to your healthcare provider before you use these products  Prevent the spread of germs:   Wash your hands often with soap and water  Wash your hands after you use the bathroom, change a child's diaper, or sneeze  Wash your hands before you prepare or eat food  Stay away from people who are sick  Some germs spread easily and quickly through contact  Follow up with your doctor as directed: You may be referred to an ear, nose, and throat specialist  Write down your questions so you remember to ask them during your visits  © Copyright [employer] 2022 Information is for End User's use only and may not be sold, redistributed or otherwise used for commercial purposes  All illustrations and images included in CareNotes® are the copyrighted property of [employer]  or Deepthi Rand  The above information is an  only  It is not intended as medical advice for individual conditions or treatments  Talk to your doctor, nurse or pharmacist before following any medical regimen to see if it is safe and effective for you

## 2022-12-30 NOTE — PROGRESS NOTES
[facility]        NAME: Michelle Nj is a 38 y o  female  : 1980    MRN: [id number]  DATE: 2022  TIME: 3:07 PM    Assessment and Plan   Acute recurrent pansinusitis [J01 41]  1  Acute recurrent pansinusitis  amoxicillin-clavulanate (AUGMENTIN) 875-125 mg per tablet            Patient Instructions     Take antibiotic as directed until completed  Motrin and/or Tylenol as needed for any fever pain  Follow up with PCP in 3-5 days  Proceed to  ER if symptoms worsen  Chief Complaint     Chief Complaint   Patient presents with   • Sinusitis     Pt reports ear pressure/pain w/ hearing impairment, top gum pain, and thick, green snot for about a week  Multiple negative home COVID tests  History of Present Illness       53-year-old female presents with sinus pain pressure congestion for over a week  Has been using over-the-counter remedies without any improvement  Has a dry cough from time to time  Denies any fevers or chills  Has taken multiple COVID tests at home which have been negative  Denies any sore throats  Feels like her ears are full and blocked up  No headaches reported    Sinusitis  This is a new problem  The current episode started 1 to 4 weeks ago  The problem has been waxing and waning since onset  There has been no fever  The pain is moderate  Associated symptoms include congestion, coughing and sinus pressure  Pertinent negatives include no chills, diaphoresis, ear pain, headaches, hoarse voice, neck pain, sneezing or sore throat  Past treatments include sitting up, lying down and oral decongestants  The treatment provided no relief  Review of Systems   Review of Systems   Constitutional: Negative  Negative for chills and diaphoresis  HENT: Positive for congestion and sinus pressure  Negative for ear pain, hoarse voice, sneezing and sore throat  Eyes: Negative  Respiratory: Positive for cough  Cardiovascular: Negative  Gastrointestinal: Negative  Musculoskeletal: Negative  Negative for neck pain  Skin: Negative  Neurological: Negative  Negative for headaches           Current Medications       Current Outpatient Medications:   •  amoxicillin-clavulanate (AUGMENTIN) 875-125 mg per tablet, Take 1 tablet by mouth every 12 (twelve) hours for 7 days, Disp: 14 tablet, Rfl: 0  •  ascorbic acid (VITAMIN C) 500 mg tablet, Take by mouth, Disp: , Rfl:   •  aspirin (ECOTRIN LOW STRENGTH) 81 mg EC tablet, Take 81 mg by mouth daily, Disp: , Rfl:   •  cetirizine (ZyrTEC) 10 mg tablet, Take 10 mg by mouth daily, Disp: , Rfl:   •  Cholecalciferol 25 MCG (1000 UT) tablet, Take 1,000 Units by mouth daily (Patient not taking: Reported on 12/2/2022), Disp: , Rfl:   •  Cyanocobalamin (VITAMIN B 12 PO), Take by mouth, Disp: , Rfl:   •  desogestrel-ethinyl estradiol (APRI) 0 15-30 MG-MCG per tablet, Take 1 tablet by mouth daily, Disp: , Rfl:   •  ELDERBERRY PO, Take by mouth, Disp: , Rfl:   •  meclizine (ANTIVERT) 25 mg tablet, Take 1 tablet (25 mg total) by mouth every 8 (eight) hours as needed for dizziness, Disp: 30 tablet, Rfl: 2  •  metoprolol succinate (TOPROL-XL) 25 mg 24 hr tablet, Take 25 mg by mouth daily, Disp: , Rfl:   •  MULTIPLE VITAMINS-MINERALS PO, Take 1 tablet by mouth daily, Disp: , Rfl:   •  sotalol (BETAPACE) 120 mg tablet, Take 1 tablet by mouth 2 (two) times a day, Disp: , Rfl:     Current Allergies     Allergies as of 12/30/2022 - Reviewed 12/30/2022   Allergen Reaction Noted   • Enalapril  04/21/2012   • Other  02/27/2009   • Penicillins Hives 06/19/2012            The following portions of the patient's history were reviewed and updated as appropriate: allergies, current medications, past family history, past medical history, past social history, past surgical history and problem list      Past Medical History:   Diagnosis Date   • Atrial fibrillation ([facility] [address] )     last assessed 03/14/2014   • BRCA1 negative    • BRCA2 negative    • Complete transposition of great vessels     last assessed 02/14/2014   • Eczema    • GERD (gastroesophageal reflux disease)    • History of cardiac cath     Cath RVOT subpulmonary obstruction, last assessed 06/19/2012   • Hyperbilirubinemia (congenital)    • Migraine    • SSS (sick sinus syndrome) ([facility] [address] )    • Torsion of small intestine (HCC)        Past Surgical History:   Procedure Laterality Date   • ADENOIDECTOMY      onset childhood   • ATRIAL SEPTECTOMY      balloon transvenous method   • CARDIAC PACEMAKER PLACEMENT      last assessed 03/14/2014   • LAPAROSCOPIC LYSIS INTESTINAL ADHESIONS     • OOPHORECTOMY     • OTHER SURGICAL HISTORY      transposition repair mustard procedure, description 11 months   • TONSILLECTOMY     • TUBAL LIGATION      last assessed 08/17/2012   • US GUIDED BREAST BIOPSY RIGHT COMPLETE Right 11/18/2019       Family History   Problem Relation Age of Onset   • Cancer Father 61        hodgkins lymphoma    • Melanoma Father    • Colon cancer Sister 25        carcinoid tumor in the rectum   • Heart disease Maternal Grandmother    • Hyperlipidemia Maternal Grandmother    • Diabetes Paternal Grandmother    • Hypertension Paternal Grandmother    • Lung cancer Paternal Grandmother    • Heart disease Paternal Grandfather    • Hyperlipidemia Paternal Grandfather    • Lung cancer Paternal Grandfather    • No Known Problems Mother    • No Known Problems Daughter    • Cancer Maternal Grandfather         tongue   • Breast cancer Paternal Aunt 46   • Ovarian cancer Paternal Aunt 34   • No Known Problems Paternal Aunt    • No Known Problems Maternal Aunt          Medications have been verified  Objective   /60   Pulse 81   Temp 97 8 °F (36 6 °C)   Resp 16   SpO2 96%   No LMP recorded  Physical Exam     Physical Exam  Vitals and nursing note reviewed  Constitutional:       General: She is not in acute distress  Appearance: Normal appearance   She is well-developed  HENT:      Head: Normocephalic and atraumatic  Right Ear: Hearing, tympanic membrane, ear canal and external ear normal  There is no impacted cerumen  Left Ear: Hearing, tympanic membrane, ear canal and external ear normal  There is no impacted cerumen  Nose: Congestion and rhinorrhea present  Right Sinus: Maxillary sinus tenderness and frontal sinus tenderness present  Left Sinus: Maxillary sinus tenderness and frontal sinus tenderness present  Mouth/Throat:      Pharynx: Uvula midline  No oropharyngeal exudate  Eyes:      General:         Right eye: No discharge  Left eye: No discharge  Conjunctiva/sclera: Conjunctivae normal    Cardiovascular:      Rate and Rhythm: Normal rate and regular rhythm  Heart sounds: Normal heart sounds  No murmur heard  Pulmonary:      Effort: Pulmonary effort is normal  No respiratory distress  Breath sounds: Normal breath sounds  No wheezing or rales  Abdominal:      General: Bowel sounds are normal       Palpations: Abdomen is soft  Tenderness: There is no abdominal tenderness  Musculoskeletal:         General: Normal range of motion  Cervical back: Normal range of motion and neck supple  Lymphadenopathy:      Cervical: No cervical adenopathy  Skin:     General: Skin is warm and dry  Neurological:      Mental Status: She is alert and oriented to person, place, and time     Psychiatric:         Mood and Affect: Mood normal

## 2022-12-30 NOTE — TELEPHONE ENCOUNTER
Regarding: Possible sinus infection / ear pain and clogged  ----- Message from Maryellen Duncan sent at 12/30/2022  7:49 AM EST -----  "I think I have a sinus infection   My ear hurts and feels clogged up "

## 2022-12-30 NOTE — TELEPHONE ENCOUNTER
Reason for Disposition  • Earache    Answer Assessment - Initial Assessment Questions  1  LOCATION: "Where does it hurt?"      Left ear and top gums  2  ONSET: "When did the sinus pain start?"  (e g , hours, days)      A week ago  3  SEVERITY: "How bad is the pain?"   (Scale 1-10; mild, moderate or severe)    - MILD (1-3): doesn't interfere with normal activities     - MODERATE (4-7): interferes with normal activities (e g , work or school) or awakens from sleep    - SEVERE (8-10): excruciating pain and patient unable to do any normal activities         6/10   4  RECURRENT SYMPTOM: "Have you ever had sinus problems before?" If so, ask: "When was the last time?" and "What happened that time?"       Gets sinus infections often  5  NASAL CONGESTION: "Is the nose blocked?" If so, ask, "Can you open it or must you breathe through the mouth?"      Yes  6  NASAL DISCHARGE: "Do you have discharge from your nose?" If so ask, "What color?"      Thick, green nasal discharge  7  FEVER: "Do you have a fever?" If so, ask: "What is it, how was it measured, and when did it start?"       Denies  8  OTHER SYMPTOMS: "Do you have any other symptoms?" (e g , sore throat, cough, earache, difficulty breathing)      Denies other symptoms  9   PREGNANCY: "Is there any chance you are pregnant?" "When was your last menstrual period?"      Denies    Protocols used: SINUS PAIN AND CONGESTION-ADULT-[place]

## 2023-01-27 ENCOUNTER — OFFICE VISIT (OUTPATIENT)
Dept: FAMILY MEDICINE CLINIC | Facility: CLINIC | Age: 43
End: 2023-01-27

## 2023-01-27 ENCOUNTER — TELEPHONE (OUTPATIENT)
Dept: OBGYN CLINIC | Facility: HOSPITAL | Age: 43
End: 2023-01-27

## 2023-01-27 VITALS
HEART RATE: 76 BPM | WEIGHT: 139 LBS | TEMPERATURE: 96.5 F | BODY MASS INDEX: 24.63 KG/M2 | SYSTOLIC BLOOD PRESSURE: 109 MMHG | OXYGEN SATURATION: 94 % | DIASTOLIC BLOOD PRESSURE: 74 MMHG | HEIGHT: 63 IN

## 2023-01-27 DIAGNOSIS — I42.9 CARDIOMYOPATHY, UNSPECIFIED TYPE (HCC): ICD-10-CM

## 2023-01-27 DIAGNOSIS — G89.29 CHRONIC RIGHT SHOULDER PAIN: ICD-10-CM

## 2023-01-27 DIAGNOSIS — G56.01 CARPAL TUNNEL SYNDROME ON RIGHT: Primary | ICD-10-CM

## 2023-01-27 DIAGNOSIS — Q20.3 COMPLETE TRANSPOSITION OF GREAT VESSELS: ICD-10-CM

## 2023-01-27 DIAGNOSIS — I49.5 SINOATRIAL NODE DYSFUNCTION (HCC): ICD-10-CM

## 2023-01-27 DIAGNOSIS — M25.511 CHRONIC RIGHT SHOULDER PAIN: ICD-10-CM

## 2023-01-27 DIAGNOSIS — M77.11 LATERAL EPICONDYLITIS OF RIGHT ELBOW: ICD-10-CM

## 2023-01-27 RX ORDER — PREDNISONE 10 MG/1
TABLET ORAL
Qty: 20 TABLET | Refills: 0 | Status: SHIPPED | OUTPATIENT
Start: 2023-01-27 | End: 2023-02-04

## 2023-01-27 RX ORDER — MELOXICAM 15 MG/1
15 TABLET ORAL DAILY
Qty: 30 TABLET | Refills: 0 | Status: SHIPPED | OUTPATIENT
Start: 2023-01-27

## 2023-01-27 NOTE — TELEPHONE ENCOUNTER
Caller: patient    Doctor: n/a    Reason for call: patient called in to schedule a new patient appt for carpal tunnel in right hand      Call back#: [phone]

## 2023-01-27 NOTE — PROGRESS NOTES
Name: Jean Renee      : 1980      MRN: [id number]  Encounter Provider: SAE Fernandez  Encounter Date: 2023   Encounter department: [place] [address]  Carpal tunnel syndrome on right  Assessment & Plan:  Prednisone taper as directed  Hold meloxicam until prednisone taper is at lower dose 20mg  Ice or heat for comfort  Can use tennis elbow band to help with symptoms  Wear carpal tunnel brace at bedtime  Schedule ortho appointment    Orders:  -     Ambulatory Referral to Orthopedic Surgery; Future  -     predniSONE 10 mg tablet; Take 4 tablets (40 mg total) by mouth daily for 2 days, THEN 3 tablets (30 mg total) daily for 2 days, THEN 2 tablets (20 mg total) daily for 2 days, THEN 1 tablet (10 mg total) daily for 2 days  -     meloxicam (Mobic) 15 mg tablet; Take 1 tablet (15 mg total) by mouth daily    2  Lateral epicondylitis of right elbow  Assessment & Plan:  Prednisone taper as directed  Hold meloxicam until prednisone taper is at lower dose 20mg  Ice or heat for comfort  Can use tennis elbow band to help with symptoms  Wear carpal tunnel brace at bedtime  Schedule ortho appointment    Orders:  -     Ambulatory Referral to Orthopedic Surgery; Future  -     predniSONE 10 mg tablet; Take 4 tablets (40 mg total) by mouth daily for 2 days, THEN 3 tablets (30 mg total) daily for 2 days, THEN 2 tablets (20 mg total) daily for 2 days, THEN 1 tablet (10 mg total) daily for 2 days  -     meloxicam (Mobic) 15 mg tablet; Take 1 tablet (15 mg total) by mouth daily    3  Chronic right shoulder pain  Assessment & Plan:  Prednisone taper as directed  Hold meloxicam until prednisone taper is at lower dose 20mg  Ice or heat for comfort  Can use tennis elbow band to help with symptoms  Wear carpal tunnel brace at bedtime  Schedule ortho appointment    Orders:  -     Ambulatory Referral to Orthopedic Surgery; Future  -     meloxicam (Mobic) 15 mg tablet;  Take 1 tablet (15 mg total) by mouth daily    4  Cardiomyopathy, unspecified type [facility])  Assessment & Plan:  Stable, asymptomatic, continues follow up with cardiology        5  Sinoatrial node dysfunction (HCC)  Assessment & Plan:  Stable, follows with EP  Has pacemaker      6  Complete transposition of great vessels         Subjective        Right upper arm pain and neck pain for months worse since Monday, Pain from elebow radiates down into hands, pinky and ring finger on right hand and thumb has a burning sensation, sometimes wakes up at night time with hand feeling asleep  Feels some weakness in arm  Pain with overhead work  Pain is a 7-10/10 advil barely takes off the edge  Has tried ice and heat without relief of symptoms  Owns a house cleaning business  Right hand dominant  Review of Systems   Constitutional: Negative  HENT: Negative  Respiratory: Negative  Cardiovascular: Negative  Gastrointestinal: Negative  Genitourinary: Negative  Musculoskeletal: Positive for arthralgias  Negative for joint swelling  Skin: Negative for rash  Neurological: Positive for numbness (tingling right arm/fingers)         Current Outpatient Medications on File Prior to Visit   Medication Sig   • ascorbic acid (VITAMIN C) 500 mg tablet Take by mouth   • aspirin (ECOTRIN LOW STRENGTH) 81 mg EC tablet Take 81 mg by mouth daily   • cetirizine (ZyrTEC) 10 mg tablet Take 10 mg by mouth daily   • Cyanocobalamin (VITAMIN B 12 PO) Take by mouth   • desogestrel-ethinyl estradiol (APRI) 0 15-30 MG-MCG per tablet Take 1 tablet by mouth daily   • ELDERBERRY PO Take by mouth   • meclizine (ANTIVERT) 25 mg tablet Take 1 tablet (25 mg total) by mouth every 8 (eight) hours as needed for dizziness   • metoprolol succinate (TOPROL-XL) 25 mg 24 hr tablet Take 25 mg by mouth daily   • MULTIPLE VITAMINS-MINERALS PO Take 1 tablet by mouth daily   • sotalol (BETAPACE) 120 mg tablet Take 1 tablet by mouth 2 (two) times a day • Cholecalciferol 25 MCG (1000 UT) tablet Take 1,000 Units by mouth daily (Patient not taking: Reported on 12/2/2022)       Objective     /74   Pulse 76   Temp (!) 96 5 °F (35 8 °C) (Tympanic)   Ht 5' 3" (1 6 m)   Wt 63 kg (139 lb)   SpO2 94%   BMI 24 62 kg/m²     Physical Exam  Vitals and nursing note reviewed  Constitutional:       Appearance: Normal appearance  She is normal weight  HENT:      Head: Normocephalic  Cardiovascular:      Heart sounds: Murmur heard  Pulmonary:      Effort: Pulmonary effort is normal  No respiratory distress  Breath sounds: Normal breath sounds  Musculoskeletal:      Right shoulder: Tenderness present  Decreased range of motion  Normal strength  Left shoulder: Normal       Right upper arm: Normal       Right elbow: No swelling  Tenderness present in lateral epicondyle  Right wrist: Tenderness present  Right hand: Tenderness present  No swelling  Normal strength  Normal sensation  Left hand: Normal       Right lower leg: No edema  Left lower leg: No edema  Comments: Pain with internal rotation of right upper arm  Tenderness lateral epicondylitis  +phalens right    Skin:     General: Skin is warm and dry  Capillary Refill: Capillary refill takes less than 2 seconds  Findings: No rash  Neurological:      Mental Status: She is alert and oriented to person, place, and time     Psychiatric:         Mood and Affect: Mood normal          Behavior: Behavior normal        Jaci Mcallister

## 2023-01-27 NOTE — TELEPHONE ENCOUNTER
Patient is being referred to a orthopedics  Please schedule accordingly      [address]   [phone]

## 2023-01-27 NOTE — PATIENT INSTRUCTIONS
Prednisone taper as directed  Hold meloxicam until prednisone taper is at lower dose 20mg  Ice or heat for comfort  Can use tennis elbow band to help with symptoms  Wear carpal tunnel brace at bedtime  Schedule ortho appointment

## 2023-01-28 PROBLEM — G56.01 CARPAL TUNNEL SYNDROME ON RIGHT: Status: ACTIVE | Noted: 2023-01-28

## 2023-01-28 PROBLEM — G89.29 CHRONIC RIGHT SHOULDER PAIN: Status: ACTIVE | Noted: 2023-01-28

## 2023-01-28 PROBLEM — M77.11 LATERAL EPICONDYLITIS OF RIGHT ELBOW: Status: ACTIVE | Noted: 2023-01-28

## 2023-01-28 PROBLEM — M25.511 CHRONIC RIGHT SHOULDER PAIN: Status: ACTIVE | Noted: 2023-01-28

## 2023-02-13 ENCOUNTER — OFFICE VISIT (OUTPATIENT)
Dept: OBGYN CLINIC | Facility: CLINIC | Age: 43
End: 2023-02-13

## 2023-02-13 VITALS
DIASTOLIC BLOOD PRESSURE: 67 MMHG | BODY MASS INDEX: 23.57 KG/M2 | SYSTOLIC BLOOD PRESSURE: 101 MMHG | HEIGHT: 63 IN | HEART RATE: 72 BPM | WEIGHT: 133 LBS

## 2023-02-13 DIAGNOSIS — G56.01 CARPAL TUNNEL SYNDROME ON RIGHT: ICD-10-CM

## 2023-02-13 DIAGNOSIS — R20.0 NUMBNESS AND TINGLING: Primary | ICD-10-CM

## 2023-02-13 DIAGNOSIS — R20.2 NUMBNESS AND TINGLING: Primary | ICD-10-CM

## 2023-02-13 DIAGNOSIS — R20.2 NUMBNESS AND TINGLING IN RIGHT HAND: ICD-10-CM

## 2023-02-13 DIAGNOSIS — R20.0 NUMBNESS AND TINGLING IN RIGHT HAND: ICD-10-CM

## 2023-02-13 DIAGNOSIS — M54.2 NECK PAIN: ICD-10-CM

## 2023-02-13 DIAGNOSIS — G89.29 CHRONIC RIGHT SHOULDER PAIN: ICD-10-CM

## 2023-02-13 DIAGNOSIS — M25.511 CHRONIC RIGHT SHOULDER PAIN: ICD-10-CM

## 2023-02-13 DIAGNOSIS — M77.11 LATERAL EPICONDYLITIS OF RIGHT ELBOW: ICD-10-CM

## 2023-02-13 NOTE — PROGRESS NOTES
1  Numbness and tingling  EMG 2 Limb Upper Extremity      2  Carpal tunnel syndrome on right  Ambulatory Referral to Orthopedic Surgery      3  Lateral epicondylitis of right elbow  Ambulatory Referral to Orthopedic Surgery    [facility]      4  Chronic right shoulder pain  Ambulatory Referral to Orthopedic Surgery    [facility]      5  Numbness and tingling in right hand  EMG 2 Limb Upper Extremity      6  Neck pain  [facility]        Orders Placed This Encounter   Procedures   • [facility]   • EMG 2 Limb Upper Extremity        IMAGING STUDIES: (I personally reviewed images in PACS and report):         PAST REPORTS:        ASSESSMENT/PLAN:  Right Arm Pain Multifactorial  Lateral epicondylitis  Right arm numbness tingling   PSH: owns house cleaning business  DDX  Radiculopathy  Cubital tunnel syndrome  CTS    Repeat X-ray next visit: None    Return in about 8 weeks (around 4/10/2023)  Patient Instructions   I explained the patient that she has multiple issues for her right arm complaint  Recommend trial physical therapy for neck stiffness as well as possible component of pinched nerves in the neck causing burning sensation in the right hand  Also explained the patient that she has tennis elbow that sometimes people with tennis ball will also get a pinched nerve in the elbow on the opposite side called cubital tunnel syndrome which can make the small finger and ring finger burn or be numbness and tingling  As such I recommended trial of physical therapy for the elbow as well as to use cubital tunnel brace at bedtime  Tennis elbow (lateral epicondylitis) or its cousin, Golfer’s elbow (medial epicondyltitis), are irritations and inflammations of the anchor points of your forearm muscles at your elbow  Tennis elbow is usually caused by overuse, extending your wrist (pushing wrist up) and golfer’s elbow by flexion of the wrist (pushing wrist down)   This takes at least 6 weeks to heal and usually up to 3 months to see complete resolution  Injections help to reduce pain significantly but the pain will return if you do not perform physical therapy  The curative treatment is physical therapy  Injections, tennis elbow straps, and anti-inflammatories only help to reduce pain  This is a clinical diagnosis and may require xray but MRI is usually not considered until at least 3 months of failed treatment  Surgery is generally reserved for cases which do not improve after 6 months of conservative treatment  ([facility] 2017)  Theraband flexbar  Begin with red bar, advance to green bar after 3-4 weeks  8-12 reps for 9 sets every other day          __________________________________________________________________________    HISTORY OF PRESENT ILLNESS:  Evaluation of multiple issues including neck pain and right arm pain  Patient points to her right elbow as source of greatest pain however she also describes burning sensation within her ring and small finger  She also has burning sensation into her thenar eminence  She describes intermittent pain stabbing at times mild to moderate intensity worse with holding things  She has seen her primary care doctor but has not started physical therapy            Review of Systems      Following history reviewed and update:    Past Medical History:   Diagnosis Date   • Atrial fibrillation ([facility] )     last assessed 03/14/2014   • BRCA1 negative    • BRCA2 negative    • Complete transposition of great vessels     last assessed 02/14/2014   • Eczema    • GERD (gastroesophageal reflux disease)    • History of cardiac cath     Cath RVOT subpulmonary obstruction, last assessed 06/19/2012   • Hyperbilirubinemia (congenital)    • Migraine    • SSS (sick sinus syndrome) ([facility] )    • Torsion of small intestine (HCC)      Past Surgical History:   Procedure Laterality Date   • ADENOIDECTOMY      onset childhood   • ATRIAL SEPTECTOMY      balloon transvenous method   • CARDIAC PACEMAKER PLACEMENT      last assessed 03/14/2014   • LAPAROSCOPIC LYSIS INTESTINAL ADHESIONS     • OOPHORECTOMY     • OTHER SURGICAL HISTORY      transposition repair mustard procedure, description 11 months   • TONSILLECTOMY     • TUBAL LIGATION      last assessed 08/17/2012   • US GUIDED BREAST BIOPSY RIGHT COMPLETE Right 11/18/2019     Social History   Social History     Substance and Sexual Activity   Alcohol Use Yes    Comment: social     Social History     Substance and Sexual Activity   Drug Use No     Social History     Tobacco Use   Smoking Status Never   Smokeless Tobacco Never     Family History   Problem Relation Age of Onset   • Cancer Father 61        hodgkins lymphoma    • Melanoma Father    • Colon cancer Sister 25        carcinoid tumor in the rectum   • Heart disease Maternal Grandmother    • Hyperlipidemia Maternal Grandmother    • Diabetes Paternal Grandmother    • Hypertension Paternal Grandmother    • Lung cancer Paternal Grandmother    • Heart disease Paternal Grandfather    • Hyperlipidemia Paternal Grandfather    • Lung cancer Paternal Grandfather    • No Known Problems Mother    • No Known Problems Daughter    • Cancer Maternal Grandfather         tongue   • Breast cancer Paternal Aunt 46   • Ovarian cancer Paternal Aunt 34   • No Known Problems Paternal Aunt    • No Known Problems Maternal Aunt      Allergies   Allergen Reactions   • Enalapril      Reaction Date: 14Apr2011;   Pt denies 2/2018  Patient stated it may interact with sotalol  • Other      Other reaction(s): Other  Tongue feels funny   • Penicillins Hives     Category: Allergy;            Physical Exam  /67 (BP Location: Left arm, Patient Position: Sitting, Cuff Size: Standard)   Pulse 72   Ht 5' 3" (1 6 m)   Wt 60 3 kg (133 lb)   BMI 23 56 kg/m²     Constitutional:  see vital signs  Gen: well-developed, normocephalic/atraumatic, well-groomed  Eyes: No inflammation or discharge of conjunctiva or lids; sclera clear   Pharynx: no inflammation, lesion, or mass of lips  Neck: supple, no masses, non-distended  MSK: no inflammation, lesion, mass, or clubbing of nails and digits except for other than mentioned below  SKIN: no visible rashes or skin lesions  Pulmonary/Chest: Effort normal  No respiratory distress  NEURO: cranial nerves grossly intact  PSYCH:  Alert and oriented to person, place, and time; recent and remote memory intact; mood normal, no depression, anxiety, or agitation, judgment and insight good and intact     Right Elbow Exam     Tenderness   The patient is experiencing tenderness in the lateral epicondyle  Range of Motion   The patient has normal right elbow ROM  Muscle Strength   The patient has normal right elbow strength      Other   Erythema: absent  Scars: absent  Sensation: normal    Comments:  Chief complaint of pain reproduced at lateral epicondyle with resisted isometric contraction of wrist and finger extensors          Cervical  ROM: intact  Midline spinous process tenderness: right   Muscular Tenderness: C7  Sensation UE Bilateral:  C5: normal  C6: normal  C7: normal  C8: normal  T1: normal  Strength UE: 5/5 elbow, wrist, fingers bilateral    RIGHT SHOULDER:  Erythema: no  Swelling: no  Increased Warmth: no    Tenderness: none    ROM  Touchdown sign: intact  External Rotation: Intact  Internal Rotation: Intact    Strength  Abduction: 5/5  ER: 5/5  IR: 5/5    Drop-Arm: negative  Emptycan: negative  Belly Press:   Lift-off Test:    Quesada: negative  Neer: negative    LEFT SHOULDER:  Strength  Abduction: 5/5  ER: 5/5  IR: 5/5    ROM  Touchdown sign: intact    Empty can: negative      Right Elbow:  no swelling, erythema, or increased warmth  rom full  no laxity of joint  Cubital tunnel Tinel's test:neg  Distal Biceps Hook test: neg    Right Wrist  no swelling, erythema, or increased warmth  rom full  nontender  no laxity of joint; druj stable  Carpal tunnel compression test: neg  Houstonen's test: neg  Karla's carpal tunnel test: neg    __________________________________________________________________________  Procedures

## 2023-02-13 NOTE — PATIENT INSTRUCTIONS
I explained the patient that she has multiple issues for her right arm complaint  Recommend trial physical therapy for neck stiffness as well as possible component of pinched nerves in the neck causing burning sensation in the right hand  Also explained the patient that she has tennis elbow that sometimes people with tennis ball will also get a pinched nerve in the elbow on the opposite side called cubital tunnel syndrome which can make the small finger and ring finger burn or be numbness and tingling  As such I recommended trial of physical therapy for the elbow as well as to use cubital tunnel brace at bedtime  Tennis elbow (lateral epicondylitis) or its cousin, Golfer’s elbow (medial epicondyltitis), are irritations and inflammations of the anchor points of your forearm muscles at your elbow  Tennis elbow is usually caused by overuse, extending your wrist (pushing wrist up) and golfer’s elbow by flexion of the wrist (pushing wrist down)  This takes at least 6 weeks to heal and usually up to 3 months to see complete resolution  Injections help to reduce pain significantly but the pain will return if you do not perform physical therapy  The curative treatment is physical therapy  Injections, tennis elbow straps, and anti-inflammatories only help to reduce pain  This is a clinical diagnosis and may require xray but MRI is usually not considered until at least 3 months of failed treatment  Surgery is generally reserved for cases which do not improve after 6 months of conservative treatment  ([facility] 2017)        Theraband flexbar  Begin with red bar, advance to green bar after 3-4 weeks  8-12 reps for 9 sets every other day

## 2023-02-20 ENCOUNTER — EVALUATION (OUTPATIENT)
Dept: PHYSICAL THERAPY | Facility: CLINIC | Age: 43
End: 2023-02-20

## 2023-02-20 DIAGNOSIS — G89.29 CHRONIC RIGHT SHOULDER PAIN: Primary | ICD-10-CM

## 2023-02-20 DIAGNOSIS — M25.511 CHRONIC RIGHT SHOULDER PAIN: Primary | ICD-10-CM

## 2023-02-20 DIAGNOSIS — M54.2 CHRONIC NECK PAIN: ICD-10-CM

## 2023-02-20 DIAGNOSIS — R20.2 PARESTHESIA OF RIGHT UPPER EXTREMITY: ICD-10-CM

## 2023-02-20 DIAGNOSIS — M54.2 NECK PAIN: ICD-10-CM

## 2023-02-20 DIAGNOSIS — G89.29 CHRONIC NECK PAIN: ICD-10-CM

## 2023-02-20 DIAGNOSIS — M77.11 LATERAL EPICONDYLITIS OF RIGHT ELBOW: ICD-10-CM

## 2023-02-20 NOTE — PROGRESS NOTES
PT Evaluation     Today's date: 2023  Patient name: Dianna Dumont  : 1980  MRN: [id number]  Referring provider: Melania Maria  Dx:   Encounter Diagnosis     ICD-10-CM    1  Chronic right shoulder pain  M25 511     G89 29       2  Paresthesia of right upper extremity  R20 2       3  Chronic neck pain  M54 2     G89 29           Start Time: 1452  Stop Time: 8107  Total time in clinic (min): 70 minutes    Assessment  Assessment details: Nikolay Clay is a 38year old patient presenting to OPPT for R shoulder paresthesias in addition to neck stiffness  Upon evaluation they show impairments in the following areas: decreased generalized UE strength, high irritability to movement throughout RUE and cervical motion, hyperalgesia/allodynia to tapping/touch in the area of cubital tunnel with (-) Tinel's test  Nikolay Clay also shows impairments of general decreased nerve mobility secondary to positive nerve tension testing for both median and ulnar nerve  Patient given home exercise program consistent of ER strengthening, ulnar and median nerve flossing  Also demonstrates signs and symptoms of localized R shoulder pain that occurs with tran-jennifer testing positioning and with internal rotation/behind the back positioning that currently limits daily function  Also signs and symptoms consistent with lateral epicondylitis with positive maudsley's test localized to distal portion of the common lateral extensor tendon as well as forearm as well as decreased  strength R > L  These impairments limit their ability to perform daily activities as well as their previous level of function causing decreased quality of life  Patient had positive response to nerve glides and IASTM manual therapy during today's session - reporting decreased symptomology in those provoking positions as well as decreased overall symptoms in a resting position   Has components of hyperalgesic/allodynia components - will benefit from PNE to reduce catastrophization and movement avoidance in order to build movement confidence  Patient requires continued skilled PT services in order to improve aforementioned impairments so that they are able to return to highest level of function possible  Without initiation of skilled PT services, patient will continue to have paresthesias and decreased activity tolerance therefore leading to difficulties with performing daily activities and decreased quality of life  Impairments: abnormal muscle firing, abnormal muscle tone, abnormal or restricted ROM, impaired balance, impaired physical strength, lacks appropriate home exercise program, pain with function, weight-bearing intolerance and poor posture     Symptom irritability: moderateUnderstanding of Dx/Px/POC: good   Prognosis: good    Goals  Short-Term (4 weeks)   1  Patient will have 50% reduction in paresthesia in RUE   2  Patient will improve c/s AROM by 25%   3  Patient will improve T/S AROM by 25%   4  Patient will be able to perform recreational activities without increase greater than 2/10 in symptomology     Long-Term (8 weeks)   1  Patient will improve greater than predicted FOTO score   2  Patient will be independent with HEP and have confidence to independently manage condition   3  Patient will have WNL c/s AROM and t/s AROM  4   Patient will have no paresthesia symptomology    Plan  Patient would benefit from: PT eval and skilled physical therapy  Planned modality interventions: thermotherapy: hydrocollator packs and cryotherapy  Planned therapy interventions: manual therapy, massage, joint mobilization, abdominal trunk stabilization, flexibility, functional ROM exercises, graded exercise, therapeutic activities, therapeutic exercise, strengthening, stretching, self care, postural training and neuromuscular re-education  Frequency: 2x week  Duration in visits: 8  Duration in weeks: 8  Plan of Care beginning date: 2/20/2023  Plan of Care expiration date: 2023  Treatment plan discussed with: patient        Subjective Evaluation    History of Present Illness  Mechanism of injury: Corey Ayala is a 38year old patient presenting to OPPT for initial evaluation regarding R arm complaints  Has neck stiffness combined with numbness and tingling down the RUE  As per her last office visit with referring provider she also has tennis elbow (lateral epicondylitis) - also has burning and tingling within the 4th and 5th digits  Patient has been encouraged by referring provider to wear night cubital tunnel splint to assist with reduction of symptoms  Reports that she has R shoulder, neck, and are painful  Has numbness and tingling in the 4th and 5th digits  Has sharp forearm pain when she flexes the elbow  R shoulder --> feels like a painful crack in her shoulderblade  Feels like she is unable to fully elevate her arm  Is able to elevate her shoulder in abduction but at end range is a little painful in the shoulderblade  Neck pain --> reports that it feels stiff on the R side  Reported that she was sore the neck day  It has been off and on for years  "normally it comes and goes", but this time reports that it has been bothering her since the middle of January  "The only time it felt okay, is when I was prescribed steroids" and that felt a lot better just general steroids, not injections  Notes that since has been doing some flex bar activities to strengthen forearm and wrist, notes that it is very painful  Mopping is very painful and scrubbing (painful to the shoulder)  Quality of life: good    Pain  Current pain ratin ("today it is really bad again"   )  At best pain ratin  At worst pain ratin  Quality: pressure and sharp (pressure in the elbow, sharp pain)  Relieving factors: heat  Exacerbated by: writing, mopping,   Progression: no change    Social Support  Steps to enter house: yes  2  Stairs in house: no   Lives in: [place]  Lives with: spouse    Employment status: working (Owns house cleaning business )  Hand dominance: right  Exercise history: "I walk and use the treadmill/bike"  Primarily cardio with some machines intermixed  Tried a week ago to do some machines at the gym - triceps was fine, biceps was painful  Objective     Active Range of Motion   Cervical/Thoracic Spine       Cervical    Flexion: 32 degrees   Extension: 40 degrees      Left lateral flexion: 20 degrees      Right lateral flexion: 18 degrees      Left rotation: 68 degrees  Right rotation: 70 degrees         Thoracic    Flexion:  Restriction level: moderate  Extension:  Restriction level: moderate  Left rotation:  Restriction level: moderate  Right rotation:  Restriction level: moderate    Left Elbow   Flexion: WFL  Extension: WFL    Right Elbow   Flexion: WFL and with pain  Extension: WFL    Additional Active Range of Motion Details  Functional ER = symmetrical   Functional IR = decreased on R side (L5 on R compared to T8 on L)     Symmetrical active motion - increased discomfort on R side during abduction but forward elevation no pain or discomfort  Strength/Myotome Testing     Left Shoulder     Planes of Motion   Flexion: 4   Extension: 4   Abduction: 4-   External rotation at 0°: 4     Right Shoulder     Planes of Motion   Flexion: 4   Extension: 4   Abduction: 4-   External rotation at 0°: 4 (Increased pain during wrist extension if pushing on wrists )     Left Elbow   Flexion: 4-  Extension: 4+    Right Elbow   Flexion: 4- (Increased pain biceps )  Extension: 4+    Left Wrist/Hand   Wrist extension: 5    Right Wrist/Hand   Wrist extension: 3+ (increased pain )    Tests   Cervical   Negative cervical distraction, alar ligament test, Sharp-Felicitas test and transverse ligament test      Left   Negative Spurling's Test A  Right   Negative Spurling's Test A  Left Shoulder   Negative drop arm       Right Shoulder   Positive Belinda Font and Speed's  Left Elbow   Negative Cozen's, Maudsley's, Mill's and Tinel's sign (cubital tunnel)  Right Elbow   Positive Maudsley's  Negative Cozen's, Mill's and Tinel's sign (cubital tunnel)  Precautions: Right bundle branch block, V-Tach non-sustained, congenital heart disease, hx of bppv, arrhythmias, PACEMAKER  High co-pay, chronic pain    Access Code: [id number] [address]  URL: [email or web address]  Date: 02/20/2023  Prepared by: Trev Knox    Exercises  • Shoulder External Rotation and Scapular Retraction with Resistance - 1 x daily - 7 x weekly - 2 sets - 10 reps  • Ulnar Nerve Flossing - 1 x daily - 7 x weekly - 1 sets - 20 reps  • Median Nerve Flossing - Tray - 1 x daily - 7 x weekly - 1 sets - 20 reps  • Mid-Lower Cervical Extension SNAG with Strap - 1 x daily - 7 x weekly - 3 sets - 10 reps  • Seated Assisted Cervical Rotation with Towel - 1 x daily - 7 x weekly - 3 sets - 10 reps  • Standing Shoulder Internal Rotation Stretch with Towel - 1 x daily - 7 x weekly - 1 sets - 3 reps - 30 hold  • Seated Isometric Wrist Extension - 1 x daily - 7 x weekly - 1 sets - 20 reps - 5 hold      Manuals 2/20 IE             IASTM lateral extensor tendon + cubital tunnel 8' MH             Vitals prior to aerobic exercise                                        Neuro Re-Ed             No Money  2x10 RTB             Median Nerve Glides x20 seated            Ulnar Nerve glides x20 seated            Rows / Ext              DNF endurance             Wrist Extensor Isometrics 10x10"                         Ther Ex             C/S Snags rot + s/b  10x5" ea             Serratus Anterior strengthening             Scapular Stabilizer Strengthening             Towel IR stretch 3x30" R side            TB ER/IR              Thoracic Rot + Ext activities                                       Ther Activity             Carry series              Push up progression             Plank progression             Gait Training                                       Modalities Self-care education PNE, chronic pain, hyperalgesia, movement confidence, nervous system anatomy x7 mins            MH/CP PRN              Dynamometer Testing NV **

## 2023-02-27 ENCOUNTER — OFFICE VISIT (OUTPATIENT)
Dept: PHYSICAL THERAPY | Facility: CLINIC | Age: 43
End: 2023-02-27

## 2023-02-27 DIAGNOSIS — G89.29 CHRONIC RIGHT SHOULDER PAIN: ICD-10-CM

## 2023-02-27 DIAGNOSIS — R20.2 PARESTHESIA OF RIGHT UPPER EXTREMITY: ICD-10-CM

## 2023-02-27 DIAGNOSIS — M77.11 LATERAL EPICONDYLITIS OF RIGHT ELBOW: ICD-10-CM

## 2023-02-27 DIAGNOSIS — M54.2 CHRONIC NECK PAIN: ICD-10-CM

## 2023-02-27 DIAGNOSIS — G89.29 CHRONIC NECK PAIN: ICD-10-CM

## 2023-02-27 DIAGNOSIS — M54.2 NECK PAIN: Primary | ICD-10-CM

## 2023-02-27 DIAGNOSIS — M25.511 CHRONIC RIGHT SHOULDER PAIN: ICD-10-CM

## 2023-02-27 NOTE — PROGRESS NOTES
Daily Note     Today's date: 2023  Patient name: Ann Marie Olivera  : 1980  MRN: [id number]  Referring provider: Ana Shin  Dx:   Encounter Diagnosis     ICD-10-CM    1  Neck pain  M54 2       2  Chronic right shoulder pain  M25 511     G89 29       3  Paresthesia of right upper extremity  R20 2       4  Chronic neck pain  M54 2     G89 29       5  Lateral epicondylitis of right elbow  M77 11                      Subjective: Patient reports exercises went okay - she had pain with median nerve glides and wasn't sure she was doing the SNAGs correctly  Notes feeling that her arm symptoms are doing a little better      Objective: See treatment diary below    Repeated seated cervical retraction 10x: worse  Repeated seated cervical retraction with self OP: better  Repeated seated thoracic extension: better    Assessment: Patient tolerated treatment well  Note reduction of right arm symptoms and shoulder blade symptoms with repeated movements of the cervical spine as noted above  Updated HEP  Education on cubital tunnel brace at night  She will benefit from continued skilled PT to address impairments and return to PLOF      Plan: Progress as tolerated - monitor radicular symptoms     Precautions: Right bundle branch block, V-Tach non-sustained, congenital heart disease, hx of bppv, arrhythmias, PACEMAKER  High co-pay, chronic pain    Access Code: [id number] [address]  URL: [email or web address]  Date: 2023  Prepared by: Ana Fuentes    Exercises  • Shoulder External Rotation and Scapular Retraction with Resistance - 1 x daily - 7 x weekly - 2 sets - 10 reps  • Ulnar Nerve Flossing - 1 x daily - 7 x weekly - 1 sets - 20 reps  • Median Nerve Flossing - Tray - 1 x daily - 7 x weekly - 1 sets - 20 reps  • Mid-Lower Cervical Extension SNAG with Strap - 1 x daily - 7 x weekly - 3 sets - 10 reps  • Seated Assisted Cervical Rotation with Towel - 1 x daily - 7 x weekly - 3 sets - 10 reps  • Standing Shoulder Internal Rotation Stretch with Towel - 1 x daily - 7 x weekly - 1 sets - 3 reps - 30 hold  • Seated Isometric Wrist Extension - 1 x daily - 7 x weekly - 1 sets - 20 reps - 5 hold      Manuals 2/20 IE  2/27           IASTM lateral extensor tendon + cubital tunnel [address]' [address]             Vitals prior to aerobic exercise              Seated CTJ HVLA  4'           Prone thoracic PA HVLA  4'                        Neuro Re-Ed             No Money  2x10 RTB             Median Nerve Glides x20 seated            Ulnar Nerve glides x20 seated            Rows / Ext              DNF endurance             Wrist Extensor Isometrics 10x10"                         Ther Ex             C/S Snags rot + s/b  10x5" ea  5ea           Serratus Anterior strengthening             Scapular Stabilizer Strengthening             Towel IR stretch 3x30" R side            TB ER/IR              Seated thoracic ext  10x                                     Ther Activity             Carry series              Push up progression             Plank progression             Gait Training                                       Modalities Self-care education PNE, chronic pain, hyperalgesia, movement confidence, nervous system anatomy x7 mins            MH/CP PRN              Dynamometer Testing NV **

## 2023-03-07 ENCOUNTER — OFFICE VISIT (OUTPATIENT)
Dept: PHYSICAL THERAPY | Facility: CLINIC | Age: 43
End: 2023-03-07

## 2023-03-07 DIAGNOSIS — M54.2 CHRONIC NECK PAIN: ICD-10-CM

## 2023-03-07 DIAGNOSIS — R20.2 PARESTHESIA OF RIGHT UPPER EXTREMITY: ICD-10-CM

## 2023-03-07 DIAGNOSIS — G89.29 CHRONIC NECK PAIN: ICD-10-CM

## 2023-03-07 DIAGNOSIS — G89.29 CHRONIC RIGHT SHOULDER PAIN: ICD-10-CM

## 2023-03-07 DIAGNOSIS — M25.511 CHRONIC RIGHT SHOULDER PAIN: ICD-10-CM

## 2023-03-07 DIAGNOSIS — M77.11 LATERAL EPICONDYLITIS OF RIGHT ELBOW: ICD-10-CM

## 2023-03-07 DIAGNOSIS — M54.2 NECK PAIN: Primary | ICD-10-CM

## 2023-03-07 NOTE — PROGRESS NOTES
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  Reports less pain with nerve glides      Objective: See treatment diary below    Repeated seated cervical retraction with self OP: decreased, better  Repeated seated cervical retraction extension 30x: decreased, better  Repeated cervical retraction extension in supine with PT OP: abolished    Assessment: Patient tolerated treatment well  Abolished pain with shoulder abduction with above repeated movements  Added cervical isometrics for neck strengthening  She will benefit from continued skilled PT to monitor radicular symptoms  Updated HEP      Plan: Progress as tolerated - monitor radicular symptoms     Precautions: Right bundle branch block, V-Tach non-sustained, congenital heart disease, hx of bppv, arrhythmias, PACEMAKER  High co-pay, chronic pain    Access Code: [id number] [address]ley Vazquez  URL: [email or web address]  Date: 2023  Prepared by: Caro Marti  • Shoulder External Rotation and Scapular Retraction with Resistance - 1 x daily - 7 x weekly - 2 sets - 10 reps  • Ulnar Nerve Flossing - 1 x daily - 7 x weekly - 1 sets - 20 reps  • Median Nerve Flossing - Tray - 1 x daily - 7 x weekly - 1 sets - 20 reps  • Mid-Lower Cervical Extension SNAG with Strap - 1 x daily - 7 x weekly - 3 sets - 10 reps  • Seated Assisted Cervical Rotation with Towel - 1 x daily - 7 x weekly - 3 sets - 10 reps  • Standing Shoulder Internal Rotation Stretch with Towel - 1 x daily - 7 x weekly - 1 sets - 3 reps - 30 hold  • Seated Isometric Wrist Extension - 1 x daily - 7 x weekly - 1 sets - 20 reps - 5 hold      Manuals 2/20 IE  2/27 3/7          IASTM lateral extensor tendon + cubital tunnel [address]' [address]             Vitals prior to aerobic exercise              Seated CTJ HVLA  4'           Prone thoracic PA HVLA  4'           Cervical retraction with ext supine   12x          Neuro Re-Ed             No Money  2x10 RTB             Median Nerve Glides x20 seated            Ulnar Nerve glides x20 seated            Rows / Ext              DNF endurance             Wrist Extensor Isometrics 10x10"                         Ther Ex             C/S Snags rot + s/b  10x5" ea  5ea           Serratus Anterior strengthening             Scapular Stabilizer Strengthening             Towel IR stretch 3x30" R side            TB ER/IR              Seated thoracic ext  10x 20x          Cervical isometrics      5"x5 ea                       Ther Activity             Carry series              Push up progression             Plank progression             Gait Training                                       Modalities Self-care education PNE, chronic pain, hyperalgesia, movement confidence, nervous system anatomy x7 mins            MH/CP PRN              Dynamometer Testing NV **

## 2023-03-13 ENCOUNTER — APPOINTMENT (OUTPATIENT)
Dept: PHYSICAL THERAPY | Facility: CLINIC | Age: 43
End: 2023-03-13

## 2023-03-20 ENCOUNTER — OFFICE VISIT (OUTPATIENT)
Dept: PHYSICAL THERAPY | Facility: CLINIC | Age: 43
End: 2023-03-20

## 2023-03-20 DIAGNOSIS — M77.11 LATERAL EPICONDYLITIS OF RIGHT ELBOW: ICD-10-CM

## 2023-03-20 DIAGNOSIS — G89.29 CHRONIC RIGHT SHOULDER PAIN: ICD-10-CM

## 2023-03-20 DIAGNOSIS — M25.511 CHRONIC RIGHT SHOULDER PAIN: ICD-10-CM

## 2023-03-20 DIAGNOSIS — M54.2 CHRONIC NECK PAIN: ICD-10-CM

## 2023-03-20 DIAGNOSIS — R20.2 PARESTHESIA OF RIGHT UPPER EXTREMITY: ICD-10-CM

## 2023-03-20 DIAGNOSIS — M54.2 NECK PAIN: Primary | ICD-10-CM

## 2023-03-20 DIAGNOSIS — G89.29 CHRONIC NECK PAIN: ICD-10-CM

## 2023-03-20 NOTE — PROGRESS NOTES
Daily Note     Today's date: 3/20/2023  Patient name: Janene Bertrand  : 1980  MRN: [id number]  Referring provider: Dee Dee Andrews  Dx:   Encounter Diagnosis     ICD-10-CM    1  Neck pain  M54 2       2  Chronic right shoulder pain  M25 511     G89 29       3  Paresthesia of right upper extremity  R20 2       4  Chronic neck pain  M54 2     G89 29       5  Lateral epicondylitis of right elbow  M77 11                      Subjective: Patient reports having pain in her right elbow and continuing to have pain into her right pinky  Overall she gets relief with the cervical retraction extension exercise      Objective: See treatment diary below    TTP to R cubital tunnel  Radiating pain into forearm with elbow flexion with OP  (+) ulnar nerve tension on R    Assessment: Patient tolerated treatment well  Improved ulnar nerve tension with manual sideglides with ulnar nerve tension  Abolished R elbow pain with humeroulnar distraction on right  She will benefit from skilled PT to continue monitoring symptoms to return to PLOF  Patient is going to tentatively schedule EMG but may cancel if continuing to progress well    Plan: Progress as tolerated - monitor radicular symptoms     Precautions: Right bundle branch block, V-Tach non-sustained, congenital heart disease, hx of bppv, arrhythmias, PACEMAKER  High co-pay, chronic pain    Access Code: [id number] [address]  URL: [email or web address]  Date: 2023  Prepared by: Kylah Ng    Exercises  • Shoulder External Rotation and Scapular Retraction with Resistance - 1 x daily - 7 x weekly - 2 sets - 10 reps  • Ulnar Nerve Flossing - 1 x daily - 7 x weekly - 1 sets - 20 reps  • Median Nerve Flossing - Tray - 1 x daily - 7 x weekly - 1 sets - 20 reps  • Mid-Lower Cervical Extension SNAG with Strap - 1 x daily - 7 x weekly - 3 sets - 10 reps  • Seated Assisted Cervical Rotation with Towel - 1 x daily - 7 x weekly - 3 sets - 10 reps  • Standing Shoulder Internal Rotation Stretch with Towel - 1 x daily - 7 x weekly - 1 sets - 3 reps - 30 hold  • Seated Isometric Wrist Extension - 1 x daily - 7 x weekly - 1 sets - 20 reps - 5 hold      Manuals 2/20 IE  2/27 3/7 3/20         IASTM lateral extensor tendon + cubital tunnel [address]' [address]             Vitals prior to aerobic exercise              Seated CTJ HVLA  4'           Prone thoracic PA HVLA  4'           Cervical retraction with ext supine   12x          Supine left to right C5-C7 sideglides with ulnar nerve tension    6'         humeroulnar distraction mob    Grade 4  6'                                   Neuro Re-Ed             No Money  2x10 RTB             Median Nerve Glides x20 seated            Ulnar Nerve glides x20 seated   10x and HEP         Rows / Ext              DNF endurance             Wrist Extensor Isometrics 10x10"                         Ther Ex             C/S Snags rot + s/b  10x5" ea  5ea           Serratus Anterior strengthening             Scapular Stabilizer Strengthening             Towel IR stretch 3x30" R side            TB ER/IR              Seated thoracic ext  10x 20x          Cervical isometrics      5"x5 ea                       Ther Activity             Carry series              Push up progression             Plank progression             Gait Training                                       Modalities Self-care education PNE, chronic pain, hyperalgesia, movement confidence, nervous system anatomy x7 mins            MH/CP PRN              Dynamometer Testing NV **

## 2023-03-28 ENCOUNTER — APPOINTMENT (OUTPATIENT)
Dept: PHYSICAL THERAPY | Facility: CLINIC | Age: 43
End: 2023-03-28

## 2023-04-04 ENCOUNTER — OFFICE VISIT (OUTPATIENT)
Dept: PHYSICAL THERAPY | Facility: CLINIC | Age: 43
End: 2023-04-04

## 2023-04-04 DIAGNOSIS — G89.29 CHRONIC RIGHT SHOULDER PAIN: ICD-10-CM

## 2023-04-04 DIAGNOSIS — R20.2 PARESTHESIA OF RIGHT UPPER EXTREMITY: ICD-10-CM

## 2023-04-04 DIAGNOSIS — M77.11 LATERAL EPICONDYLITIS OF RIGHT ELBOW: ICD-10-CM

## 2023-04-04 DIAGNOSIS — M54.2 NECK PAIN: Primary | ICD-10-CM

## 2023-04-04 DIAGNOSIS — M25.511 CHRONIC RIGHT SHOULDER PAIN: ICD-10-CM

## 2023-04-04 DIAGNOSIS — M54.2 CHRONIC NECK PAIN: ICD-10-CM

## 2023-04-04 DIAGNOSIS — G89.29 CHRONIC NECK PAIN: ICD-10-CM

## 2023-04-04 NOTE — PROGRESS NOTES
Daily Note     Today's date: 2023  Patient name: Yovany Verdin  : 1980  MRN: [id number]  Referring provider: Som Pichardo  Dx:   Encounter Diagnosis     ICD-10-CM    1  Neck pain  M54 2       2  Chronic right shoulder pain  M25 511     G89 29       3  Paresthesia of right upper extremity  R20 2       4  Chronic neck pain  M54 2     G89 29       5  Lateral epicondylitis of right elbow  M77 11                      Subjective: Patient notes feeling good overall - does continue to have R elbow pain tender to touch  Notes she did not schedule the EMG - feels she does not need it      Objective: See treatment diary below    TTP to R cubital tunnel  (+) ulnar nerve tension on R    Assessment: Patient tolerated treatment well  Improved ulnar nerve tension with manual sideglides with ulnar nerve tension  Abolished R elbow pain with humeroulnar distraction on right  Updated HEP to include theraband row, shoulder extension, and horizontal abduction as well as elbow flexion PROM with towel roll in elbow to simulate humeroulnar distraction  Potential discharge next visit if continuing to manage well with independent HEP    Plan: Possible discharge     Precautions: Right bundle branch block, V-Tach non-sustained, congenital heart disease, hx of bppv, arrhythmias, PACEMAKER  High co-pay, chronic pain    Access Code: [id number] [address]  URL: [email or web address]  Date: 2023  Prepared by: Ruthy Marti  • Shoulder External Rotation and Scapular Retraction with Resistance - 1 x daily - 7 x weekly - 2 sets - 10 reps  • Ulnar Nerve Flossing - 1 x daily - 7 x weekly - 1 sets - 20 reps  • Median Nerve Flossing - Tray - 1 x daily - 7 x weekly - 1 sets - 20 reps  • Mid-Lower Cervical Extension SNAG with Strap - 1 x daily - 7 x weekly - 3 sets - 10 reps  • Seated Assisted Cervical Rotation with Towel - 1 x daily - 7 x weekly - 3 sets - 10 reps  • Standing Shoulder Internal Rotation "Stretch with Towel - 1 x daily - 7 x weekly - 1 sets - 3 reps - 30 hold  • Seated Isometric Wrist Extension - 1 x daily - 7 x weekly - 1 sets - 20 reps - 5 hold      Manuals 2/20 IE  2/27 3/7 3/20 4/4        IASTM lateral extensor tendon + cubital tunnel [address]' [address]             Vitals prior to aerobic exercise              Seated CTJ HVLA  4'           Prone thoracic PA HVLA  4'           Cervical retraction with ext supine   12x          Supine left to right C5-C7 sideglides with ulnar nerve tension    6' 6'        humeroulnar distraction mob    Grade 4  6' grade 4  6'                                  Neuro Re-Ed             No Money  2x10 RTB             Median Nerve Glides x20 seated            Ulnar Nerve glides x20 seated   10x and HEP         Rows / Ext      GTB 20ea        DNF endurance             Wrist Extensor Isometrics 10x10\"            TB horz abd     OTB 20ea        Ther Ex             C/S Snags rot + s/b  10x5\" ea  5ea           Serratus Anterior strengthening             Scapular Stabilizer Strengthening             Towel IR stretch 3x30\" R side            TB ER/IR              Seated thoracic ext  10x 20x          Cervical isometrics      5\"x5 ea          Elbow flexion PROM w/ towel roll in elbow    HEP         Ther Activity             Carry series              Push up progression             Plank progression             Gait Training                                       Modalities Self-care education PNE, chronic pain, hyperalgesia, movement confidence, nervous system anatomy x7 mins            MH/CP PRN              Dynamometer Testing NV **                  "

## 2023-04-12 PROBLEM — Z13.0 SCREENING FOR DEFICIENCY ANEMIA: Status: RESOLVED | Noted: 2020-05-11 | Resolved: 2023-04-12

## 2023-04-12 PROBLEM — Z12.31 ENCOUNTER FOR SCREENING MAMMOGRAM FOR BREAST CANCER: Status: ACTIVE | Noted: 2023-04-12

## 2023-04-12 PROBLEM — Z13.220 SCREENING FOR LIPID DISORDERS: Status: RESOLVED | Noted: 2020-05-11 | Resolved: 2023-04-12

## 2023-04-12 PROBLEM — L20.84 INTRINSIC ECZEMA: Status: ACTIVE | Noted: 2023-04-12

## 2023-04-12 PROBLEM — Z13.29 SCREENING FOR THYROID DISORDER: Status: RESOLVED | Noted: 2020-05-11 | Resolved: 2023-04-12

## 2023-04-12 PROBLEM — Z13.29 SCREENING FOR ENDOCRINE DISORDER: Status: RESOLVED | Noted: 2020-05-11 | Resolved: 2023-04-12

## 2023-04-17 ENCOUNTER — APPOINTMENT (OUTPATIENT)
Dept: PHYSICAL THERAPY | Facility: CLINIC | Age: 43
End: 2023-04-17

## 2023-05-10 ENCOUNTER — OFFICE VISIT (OUTPATIENT)
Dept: FAMILY MEDICINE CLINIC | Facility: CLINIC | Age: 43
End: 2023-05-10

## 2023-05-10 VITALS
OXYGEN SATURATION: 98 % | HEART RATE: 92 BPM | HEIGHT: 63 IN | WEIGHT: 135 LBS | DIASTOLIC BLOOD PRESSURE: 66 MMHG | TEMPERATURE: 98.5 F | SYSTOLIC BLOOD PRESSURE: 98 MMHG | BODY MASS INDEX: 23.92 KG/M2

## 2023-05-10 DIAGNOSIS — H65.92 LEFT OTITIS MEDIA WITH EFFUSION: Primary | ICD-10-CM

## 2023-05-10 RX ORDER — CEFDINIR 300 MG/1
300 CAPSULE ORAL EVERY 12 HOURS SCHEDULED
Qty: 20 CAPSULE | Refills: 0 | Status: SHIPPED | OUTPATIENT
Start: 2023-05-10 | End: 2023-05-20

## 2023-05-10 NOTE — PROGRESS NOTES
Name: Tawanna Opitz      : 1980      MRN: [id number]  Encounter Provider: SAE Majano  Encounter Date: 5/10/2023   Encounter department: [place] [address]  Left otitis media with effusion  Assessment & Plan:  Continue nasal spray and antihistamine  Cefdinir twice daily for 10 days, take with food    Orders:  -     cefdinir (OMNICEF) 300 mg capsule; Take 1 capsule (300 mg total) by mouth every 12 (twelve) hours for 10 days      Depression Screening and Follow-up Plan: Patient was screened for depression during today's encounter  They screened negative with a PHQ-2 score of 0  Subjective      Left ear pain for about a week, thought it was allergies but now is worse  Sounds like static in ear, comes and goes  Pulling ear makes it worse  No hearing changes  Takes allergy pill every day, has nose spray she uses daily  Sometimes has popping and cracking in ear  No drainage from ear  No fevers  Review of Systems   Constitutional: Negative  HENT: Positive for ear pain, hearing loss and postnasal drip  Negative for ear discharge and sore throat  Respiratory: Negative for cough and shortness of breath  Gastrointestinal: Negative  Neurological: Negative          Current Outpatient Medications on File Prior to Visit   Medication Sig   • ascorbic acid (VITAMIN C) 500 mg tablet Take by mouth   • aspirin (ECOTRIN LOW STRENGTH) 81 mg EC tablet Take 81 mg by mouth daily   • betamethasone, augmented, (DIPROLENE) 0 05 % ointment Apply topically 2 (two) times a day   • cetirizine (ZyrTEC) 10 mg tablet Take 10 mg by mouth daily   • Cholecalciferol 25 MCG (1000 UT) tablet Take 1,000 Units by mouth daily   • Cyanocobalamin (VITAMIN B 12 PO) Take by mouth   • ELDERBERRY PO Take by mouth   • meclizine (ANTIVERT) 25 mg tablet Take 1 tablet (25 mg total) by mouth every 8 (eight) hours as needed for dizziness   • meloxicam (Mobic) 15 mg tablet Take 1 "tablet (15 mg total) by mouth daily   • metoprolol succinate (TOPROL-XL) 25 mg 24 hr tablet Take 25 mg by mouth daily   • mometasone (ELOCON) 0 1 % cream Apply topically daily   • MULTIPLE VITAMINS-MINERALS PO Take 1 tablet by mouth daily   • sotalol (BETAPACE) 120 mg tablet Take 1 tablet by mouth 2 (two) times a day       Objective     BP 98/66   Pulse 92   Temp 98 5 °F (36 9 °C)   Ht 5' 3\" (1 6 m)   Wt 61 2 kg (135 lb)   SpO2 98%   BMI 23 91 kg/m²     Physical Exam  Constitutional:       General: She is not in acute distress  Appearance: Normal appearance  She is normal weight  HENT:      Right Ear: Tympanic membrane, ear canal and external ear normal       Left Ear: A middle ear effusion is present  Tympanic membrane is erythematous and bulging  Nose: Rhinorrhea present  Mouth/Throat:      Mouth: Mucous membranes are moist       Pharynx: Oropharynx is clear  Eyes:      Extraocular Movements: Extraocular movements intact  Pupils: Pupils are equal, round, and reactive to light  Cardiovascular:      Rate and Rhythm: Regular rhythm  Heart sounds: Murmur heard  Pulmonary:      Effort: No respiratory distress  Breath sounds: Normal breath sounds  No rhonchi  Abdominal:      Palpations: Abdomen is soft  Neurological:      Mental Status: She is alert         SAE Ayala  "

## 2023-05-17 PROBLEM — H65.92 LEFT OTITIS MEDIA WITH EFFUSION: Status: ACTIVE | Noted: 2023-05-17

## 2023-05-18 ENCOUNTER — HOSPITAL ENCOUNTER (OUTPATIENT)
Dept: NEUROLOGY | Facility: CLINIC | Age: 43
End: 2023-05-18

## 2023-05-18 DIAGNOSIS — R20.0 NUMBNESS AND TINGLING: ICD-10-CM

## 2023-05-18 DIAGNOSIS — R20.2 NUMBNESS AND TINGLING: ICD-10-CM

## 2023-05-18 DIAGNOSIS — R20.0 NUMBNESS AND TINGLING IN RIGHT HAND: ICD-10-CM

## 2023-05-18 DIAGNOSIS — R20.2 NUMBNESS AND TINGLING IN RIGHT HAND: ICD-10-CM

## 2023-06-11 PROBLEM — Z00.00 WELL ADULT EXAM: Status: RESOLVED | Noted: 2020-05-11 | Resolved: 2023-06-11

## 2023-06-16 ENCOUNTER — OFFICE VISIT (OUTPATIENT)
Dept: FAMILY MEDICINE CLINIC | Facility: CLINIC | Age: 43
End: 2023-06-16
Payer: COMMERCIAL

## 2023-06-16 VITALS
OXYGEN SATURATION: 97 % | WEIGHT: 136 LBS | SYSTOLIC BLOOD PRESSURE: 98 MMHG | DIASTOLIC BLOOD PRESSURE: 64 MMHG | BODY MASS INDEX: 24.09 KG/M2 | TEMPERATURE: 97.7 F | HEART RATE: 73 BPM

## 2023-06-16 DIAGNOSIS — R68.81 EARLY SATIETY: ICD-10-CM

## 2023-06-16 DIAGNOSIS — Z12.31 ENCOUNTER FOR SCREENING MAMMOGRAM FOR BREAST CANCER: Primary | ICD-10-CM

## 2023-06-16 DIAGNOSIS — N91.2 AMENORRHEA: ICD-10-CM

## 2023-06-16 DIAGNOSIS — R10.32 LLQ PAIN: ICD-10-CM

## 2023-06-16 DIAGNOSIS — N64.4 BREAST PAIN, RIGHT: ICD-10-CM

## 2023-06-16 LAB
SL AMB  POCT GLUCOSE, UA: NORMAL
SL AMB LEUKOCYTE ESTERASE,UA: NORMAL
SL AMB POCT BILIRUBIN,UA: NORMAL
SL AMB POCT BLOOD,UA: NORMAL
SL AMB POCT CLARITY,UA: CLEAR
SL AMB POCT COLOR,UA: YELLOW
SL AMB POCT KETONES,UA: NORMAL
SL AMB POCT NITRITE,UA: NORMAL
SL AMB POCT PH,UA: 6.5
SL AMB POCT SPECIFIC GRAVITY,UA: >=1.03
SL AMB POCT URINE HCG: NORMAL
SL AMB POCT URINE PROTEIN: NORMAL
SL AMB POCT UROBILINOGEN: 0.2

## 2023-06-16 PROCEDURE — 81002 URINALYSIS NONAUTO W/O SCOPE: CPT | Performed by: NURSE PRACTITIONER

## 2023-06-16 PROCEDURE — 81025 URINE PREGNANCY TEST: CPT | Performed by: NURSE PRACTITIONER

## 2023-06-16 PROCEDURE — 99213 OFFICE O/P EST LOW 20 MIN: CPT | Performed by: NURSE PRACTITIONER

## 2023-06-16 NOTE — PROGRESS NOTES
Name: Julio Champion      : 1980      MRN: [id number]  Encounter Provider: SAE Smith  Encounter Date: 2023   Encounter department: [facility]     1  Encounter for screening mammogram for breast cancer    2  LLQ pain  Assessment & Plan:  Check US    Orders:  -     POCT urine dip    3  Amenorrhea  Assessment & Plan:  Urine pregnancy negative  Schedule US abd pelvis with transvaginal     Orders:  -     POCT urine HCG    4  Breast pain, right  -     Mammo diagnostic bilateral w cad; Future; Expected date: 2023  -     US breast right limited (diagnostic); Future; Expected date: 2023    5  Early satiety         Subjective      Left lower quadrant pain/pressure intermittently for the last 1-2 months  Used to get that feeling all the time before getting her menses in the past   May 6-may 16th had spotting on and off but then no regular menses  Was due last Friday for menses and has not had it yet  Sexually active, had tubal ligation  Pregnancy test in office was negative  Hx of ovarian cysts, had removed  approximately  Normal bowel movements for her- mix of constipation and diarrhea, no changes  No blood in stool or black tarry stools, this pain sometimes makes her feel like she has to have a bowel movement but she doesn't  Used to be sharp pain with sitting but then once she got her period it would go away  This time its pressure, worse with laying down feels a weight on the area  Slight nausea since May, feels sick to her stomach  Early satiety, feels she is eating smaller meals throughout the day instead of larger meal at end of day  Had pelvic US 2022 because she hadnt had a period from Oct-2022    Also has right breast pain that has been intermittent usually with her menses  No discharge no nipple, no changes in skin texture  No rashes, no lumps or bumps in breast  Only the right breast is bothersome   Hx of right breat biopsy 2019  Right breast pain- cannot have screening mammogram  Needs new orders for diagnostic imaging    Abdominal Pain  This is a recurrent problem  The current episode started 1 to 4 weeks ago  The onset quality is gradual  The problem occurs intermittently  The most recent episode lasted 4 hours  The problem has been waxing and waning  The pain is located in the LLQ and left flank  The pain is at a severity of 3/10  The quality of the pain is dull and a sensation of fullness  The abdominal pain radiates to the pelvis and perineum  Associated symptoms include anorexia, frequency and nausea  Pertinent negatives include no arthralgias, belching, constipation, diarrhea, dysuria, fever, flatus, headaches, hematochezia, hematuria, melena, myalgias, vomiting or weight loss  The pain is aggravated by certain positions and palpation  The pain is relieved by nothing  Review of Systems   Constitutional: Negative for fever and weight loss  Gastrointestinal: Positive for abdominal pain, anorexia and nausea  Negative for constipation, diarrhea, flatus, hematochezia, melena and vomiting  Genitourinary: Positive for frequency  Negative for dysuria and hematuria  Musculoskeletal: Negative for arthralgias and myalgias  Neurological: Negative for headaches         Current Outpatient Medications on File Prior to Visit   Medication Sig   • ascorbic acid (VITAMIN C) 500 mg tablet Take by mouth   • aspirin (ECOTRIN LOW STRENGTH) 81 mg EC tablet Take 81 mg by mouth daily   • betamethasone, augmented, (DIPROLENE) 0 05 % ointment Apply topically 2 (two) times a day   • cetirizine (ZyrTEC) 10 mg tablet Take 10 mg by mouth daily   • Cholecalciferol 25 MCG (1000 UT) tablet Take 1,000 Units by mouth daily   • Cyanocobalamin (VITAMIN B 12 PO) Take by mouth   • ELDERBERRY PO Take by mouth   • meclizine (ANTIVERT) 25 mg tablet Take 1 tablet (25 mg total) by mouth every 8 (eight) hours as needed for dizziness   • meloxicam (Mobic) 15 mg tablet Take 1 tablet (15 mg total) by mouth daily   • metoprolol succinate (TOPROL-XL) 25 mg 24 hr tablet Take 25 mg by mouth daily   • mometasone (ELOCON) 0 1 % cream Apply topically daily   • MULTIPLE VITAMINS-MINERALS PO Take 1 tablet by mouth daily   • sotalol (BETAPACE) 120 mg tablet Take 1 tablet by mouth 2 (two) times a day       Objective     BP 98/64   Pulse 73   Temp 97 7 °F (36 5 °C)   Wt 61 7 kg (136 lb)   SpO2 97%   BMI 24 09 kg/m²     Physical Exam  Vitals and nursing note reviewed  Constitutional:       Appearance: Normal appearance  She is normal weight  HENT:      Head: Normocephalic  Eyes:      General: No scleral icterus  Conjunctiva/sclera: Conjunctivae normal       Pupils: Pupils are equal, round, and reactive to light  Cardiovascular:      Rate and Rhythm: Normal rate and regular rhythm  Heart sounds: Murmur heard  Pulmonary:      Effort: Pulmonary effort is normal  No respiratory distress  Breath sounds: Normal breath sounds  Abdominal:      General: Bowel sounds are normal       Palpations: Abdomen is soft  Tenderness: There is abdominal tenderness (LLQ)  There is no right CVA tenderness, left CVA tenderness or guarding  Neurological:      Mental Status: She is alert     Psychiatric:         Mood and Affect: Mood normal          Behavior: Behavior normal        Reanna Martinez

## 2023-06-22 ENCOUNTER — HOSPITAL ENCOUNTER (OUTPATIENT)
Dept: ULTRASOUND IMAGING | Facility: HOSPITAL | Age: 43
End: 2023-06-22
Payer: COMMERCIAL

## 2023-06-22 DIAGNOSIS — R68.81 EARLY SATIETY: ICD-10-CM

## 2023-06-22 DIAGNOSIS — N91.2 AMENORRHEA: ICD-10-CM

## 2023-06-22 DIAGNOSIS — R10.32 LLQ PAIN: ICD-10-CM

## 2023-06-22 PROCEDURE — 76700 US EXAM ABDOM COMPLETE: CPT

## 2023-06-22 PROCEDURE — 76830 TRANSVAGINAL US NON-OB: CPT

## 2023-06-22 PROCEDURE — 76856 US EXAM PELVIC COMPLETE: CPT

## 2023-06-25 PROBLEM — N91.2 AMENORRHEA: Status: ACTIVE | Noted: 2023-06-25

## 2023-06-25 PROBLEM — R68.81 EARLY SATIETY: Status: ACTIVE | Noted: 2023-06-25

## 2023-06-25 PROBLEM — R10.32 LLQ PAIN: Status: ACTIVE | Noted: 2023-06-25

## 2023-06-30 ENCOUNTER — TELEPHONE (OUTPATIENT)
Dept: FAMILY MEDICINE CLINIC | Facility: CLINIC | Age: 43
End: 2023-06-30

## 2023-06-30 NOTE — TELEPHONE ENCOUNTER
----- Message from Zhang Stapleton, Alicia Rand sent at 6/29/2023  8:53 PM EDT -----  Kathie Mondragon, Your pelvic ultrasound shows a left ovarian cyst that they recommend repeating an ultrasound in 8 to 12 weeks to ensure stability and/or resolution  Given your tenderness and pain in that area I think see GYN would be appropriate  On the right side there is a probable hemorrhagic follicle- these can develop during ovulation when egg is released through ovarian follicle that follicle bleeds into a cyst  The findings mentioned were not present on pelvic US in December

## 2023-06-30 NOTE — RESULT ENCOUNTER NOTE
Shekhar Xavier, Your pelvic ultrasound shows a left ovarian cyst that they recommend repeating an ultrasound in 8 to 12 weeks to ensure stability and/or resolution  Given your tenderness and pain in that area I think see GYN would be appropriate  On the right side there is a probable hemorrhagic follicle- these can develop during ovulation when egg is released through ovarian follicle that follicle bleeds into a cyst  The findings mentioned were not present on pelvic US in December

## 2023-06-30 NOTE — TELEPHONE ENCOUNTER
----- Message from Shirly Dakins, [address] sent at 6/29/2023  8:33 PM EDT -----  Roberta Torres, Your abdominal ultrasound was normal

## 2023-07-26 ENCOUNTER — HOSPITAL ENCOUNTER (OUTPATIENT)
Dept: ULTRASOUND IMAGING | Facility: CLINIC | Age: 43
Discharge: HOME | End: 2023-07-26
Payer: COMMERCIAL

## 2023-07-26 ENCOUNTER — HOSPITAL ENCOUNTER (OUTPATIENT)
Dept: MAMMOGRAPHY | Facility: CLINIC | Age: 43
Discharge: HOME | End: 2023-07-26
Payer: COMMERCIAL

## 2023-07-26 ENCOUNTER — TELEPHONE (OUTPATIENT)
Dept: FAMILY MEDICINE CLINIC | Facility: CLINIC | Age: 43
End: 2023-07-26

## 2023-07-26 DIAGNOSIS — N64.4 BREAST PAIN, RIGHT: ICD-10-CM

## 2023-07-26 PROCEDURE — 77066 DX MAMMO INCL CAD BI: CPT

## 2023-07-26 PROCEDURE — G0279 TOMOSYNTHESIS, MAMMO: HCPCS

## 2023-07-26 NOTE — TELEPHONE ENCOUNTER
----- Message from Cindy Cadena, [address] [place] [address] sent at 7/26/2023  4:02 PM EDT -----  Kenzie Winston, Your diagnostic mammogram was normal no evidence of malignancy, repeat screening in 1 year of both breasts.

## 2023-07-26 NOTE — RESULT ENCOUNTER NOTE
Shekhar Xavier, Your diagnostic mammogram was normal no evidence of malignancy, repeat screening in 1 year of both breasts.

## 2023-10-03 ENCOUNTER — OFFICE VISIT (OUTPATIENT)
Dept: FAMILY MEDICINE CLINIC | Facility: CLINIC | Age: 43
End: 2023-10-03
Payer: COMMERCIAL

## 2023-10-03 ENCOUNTER — TELEPHONE (OUTPATIENT)
Dept: FAMILY MEDICINE CLINIC | Facility: CLINIC | Age: 43
End: 2023-10-03

## 2023-10-03 VITALS
HEIGHT: 63 IN | WEIGHT: 133 LBS | OXYGEN SATURATION: 97 % | HEART RATE: 78 BPM | SYSTOLIC BLOOD PRESSURE: 100 MMHG | BODY MASS INDEX: 23.57 KG/M2 | TEMPERATURE: 97.5 F | DIASTOLIC BLOOD PRESSURE: 70 MMHG

## 2023-10-03 DIAGNOSIS — M77.12 LATERAL EPICONDYLITIS, LEFT ELBOW: Primary | ICD-10-CM

## 2023-10-03 PROCEDURE — 99213 OFFICE O/P EST LOW 20 MIN: CPT | Performed by: NURSE PRACTITIONER

## 2023-10-03 RX ORDER — PREDNISONE 10 MG/1
TABLET ORAL
Qty: 20 TABLET | Refills: 0 | Status: SHIPPED | OUTPATIENT
Start: 2023-10-03 | End: 2023-10-10

## 2023-10-03 NOTE — TELEPHONE ENCOUNTER
Confirmation - Referral [id number]  Effective: 07/05/2023     Expires: 10/03/2023  Active  Referred From  [facility]  Group NPI: [id number]  Provider ID: [id number]  Tax ID: [id number]  [address] Dnaica Lai, [address]  Referred To  [address]  Specialty: Not Available  Tier 3  Group NPI: [id number]  Provider ID: [id number]  Tax ID: [id number]  This referral is valid at any location for the above group  Patient Info  Jessica Sanchez  [id number]  Female  1980  [address]  [place], [address] [facility]  Clinical Information  Place of Service  Office  Service Type  Medical Care  Diagnoses  1 Z95.0 - presence of cardiac pacemaker  2 I49.5 - sick sinus syndrome  Procedures  1 69489 - interrogation device evaluation(s) (remote), up to 90 days; single, dual, or multiple lead pacemaker system, or leadless pacemaker system with interim analysis, review(s) and report(s) by a physician or other qualified health care professio [facility]  2 47835 - interrogation device evaluation(s) (remote), up to 90 days; single, dual, or multiple lead pacemaker system, leadless pacemaker system, or implantable defibrillator system, remote data acquisition(s), receipt of transmissions and technician review, technical support and distribution of results

## 2023-10-03 NOTE — PROGRESS NOTES
Name: Bernabe Cadena      : 1980      MRN: [id number]  Encounter Provider: ASE Carr  Encounter Date: 10/3/2023   Encounter department: [facility]    Assessment & Plan     1. Lateral epicondylitis, left elbow  Assessment & Plan:  Prednisone taper as directed  Ice or heat for comfort  Can use tennis elbow band to help with symptoms  Schedule ortho appointment    Orders:  -     predniSONE 10 mg tablet; Take 4 tablets (40 mg total) by mouth daily for 2 days, THEN 3 tablets (30 mg total) daily for 2 days, THEN 2 tablets (20 mg total) daily for 2 days, THEN 1 tablet (10 mg total) daily for 2 days. -     Ambulatory Referral to Orthopedic Surgery; Future           Subjective      Started with left elbow pain for the last month, getting worse, doing physical therapy exercises. Advil takes edge off  Has tried ice and heat without much improvement  Went to chiropractor and massage- hurts to do stretches. Pain is isolated to elbow  Not radiating down into hands/fingers. Tried tennis elbow band without improvement           Review of Systems   Constitutional: Negative. HENT: Negative. Respiratory: Negative. Cardiovascular: Negative. Gastrointestinal: Negative. Genitourinary: Negative. Musculoskeletal: Positive for arthralgias (left  elbow). Negative for joint swelling. Skin: Negative for rash. Neurological: Negative for numbness.        Current Outpatient Medications on File Prior to Visit   Medication Sig   • ascorbic acid (VITAMIN C) 500 mg tablet Take by mouth   • aspirin (ECOTRIN LOW STRENGTH) 81 mg EC tablet Take 81 mg by mouth daily   • betamethasone, augmented, (DIPROLENE) 0.05 % ointment Apply topically 2 (two) times a day   • cetirizine (ZyrTEC) 10 mg tablet Take 10 mg by mouth daily   • Cholecalciferol 25 MCG (1000 UT) tablet Take 1,000 Units by mouth daily   • Cyanocobalamin (VITAMIN B 12 PO) Take by mouth   • ELDERBERRY PO Take by mouth   • meclizine (ANTIVERT) 25 mg tablet Take 1 tablet (25 mg total) by mouth every 8 (eight) hours as needed for dizziness   • meloxicam (Mobic) 15 mg tablet Take 1 tablet (15 mg total) by mouth daily   • metoprolol succinate (TOPROL-XL) 25 mg 24 hr tablet Take 25 mg by mouth daily   • mometasone (ELOCON) 0.1 % cream Apply topically daily   • MULTIPLE VITAMINS-MINERALS PO Take 1 tablet by mouth daily   • sotalol (BETAPACE) 120 mg tablet Take 1 tablet by mouth 2 (two) times a day       Objective     /70 (BP Location: Left arm, Patient Position: Sitting, Cuff Size: Standard)   Pulse 78   Temp 97.5 °F (36.4 °C) (Tympanic)   Ht 5' 3" (1.6 m)   Wt 60.3 kg (133 lb)   SpO2 97%   BMI 23.56 kg/m²     Physical Exam  Vitals and nursing note reviewed. Constitutional:       Appearance: Normal appearance. She is normal weight. HENT:      Head: Normocephalic. Cardiovascular:      Heart sounds: Murmur heard. Pulmonary:      Effort: Pulmonary effort is normal. No respiratory distress. Breath sounds: Normal breath sounds. Musculoskeletal:      Right shoulder: No tenderness. Normal range of motion. Normal strength. Left shoulder: Tenderness present. Decreased range of motion. Normal strength. Right upper arm: Normal.      Left upper arm: Normal.      Right elbow: No swelling. Left elbow: No swelling. Tenderness present in lateral epicondyle. Right wrist: Normal.      Right hand: Normal.      Left hand: Normal.      Right lower leg: No edema. Left lower leg: No edema. Comments: Pain in left arm with supination/pronation  Tenderness left lateral epicondylitis  Negative phalens, tinels, finkelsteins     Skin:     General: Skin is warm and dry. Capillary Refill: Capillary refill takes less than 2 seconds. Findings: No rash. Neurological:      Mental Status: She is alert and oriented to person, place, and time.    Psychiatric:         Mood and Affect: Mood normal. Behavior: Behavior normal.       Vikash Sanchez

## 2023-10-03 NOTE — ASSESSMENT & PLAN NOTE
Prednisone taper as directed  Ice or heat for comfort  Can use tennis elbow band to help with symptoms  Schedule ortho appointment

## 2023-11-13 ENCOUNTER — APPOINTMENT (OUTPATIENT)
Dept: RADIOLOGY | Facility: CLINIC | Age: 43
End: 2023-11-13
Payer: COMMERCIAL

## 2023-11-13 ENCOUNTER — OFFICE VISIT (OUTPATIENT)
Dept: OBGYN CLINIC | Facility: CLINIC | Age: 43
End: 2023-11-13
Payer: COMMERCIAL

## 2023-11-13 VITALS — DIASTOLIC BLOOD PRESSURE: 74 MMHG | SYSTOLIC BLOOD PRESSURE: 109 MMHG | HEART RATE: 76 BPM

## 2023-11-13 DIAGNOSIS — M77.12 LATERAL EPICONDYLITIS, LEFT ELBOW: ICD-10-CM

## 2023-11-13 DIAGNOSIS — M77.12 LATERAL EPICONDYLITIS, LEFT ELBOW: Primary | ICD-10-CM

## 2023-11-13 PROCEDURE — 99214 OFFICE O/P EST MOD 30 MIN: CPT | Performed by: FAMILY MEDICINE

## 2023-11-13 PROCEDURE — 73080 X-RAY EXAM OF ELBOW: CPT

## 2023-11-13 NOTE — PATIENT INSTRUCTIONS
Tennis elbow (lateral epicondylitis) or its cousin, Golfer’s elbow (medial epicondyltitis), are irritations and inflammations of the anchor points of your forearm muscles at your elbow. Tennis elbow is usually caused by overuse, extending your wrist (pushing wrist up) and golfer’s elbow by flexion of the wrist (pushing wrist down). This takes at least 6 weeks to heal and usually up to 3 months to see complete resolution. Injections help to reduce pain significantly but the pain will return if you do not perform physical therapy. The curative treatment is physical therapy. Injections, tennis elbow straps, and anti-inflammatories only help to reduce pain. This is a clinical diagnosis and may require xray but MRI is usually not considered until at least 3 months of failed treatment. Surgery is generally reserved for cases which do not improve after 6 months of conservative treatment. (CAN Chan 2017). Theraband flexbar  Begin with red bar, advance to green bar after 3-4 weeks  8-12 reps for 9 sets every other day      PRP stands for Platelet Rich Plasma and is a Injectate solution prepared from a patient's own blood that has a high concentration of platelets. Blood is drawn from the patient in the same way that occurs during a routine blood draw. A special machine then extracts platelet growth factors and other natural chemicals from the patient's own blood that are hypothesized to help healing tissue. PRP has been used to help healing tissues since approximately 1999, and specifically, for arthritis and cartilage problems since 2003. Injection of PRP is recommended under ultrasound to attain visuzlization of needle and injectate into target site. Once injected, PRP begins to clot and within 10 minutes of clotting, the platelets begin to secrete their PDGFs (Platelet derived growth factors) which help to aid healing. Lab studies in petri dishes have shown that these healing factors aid in growth of tissue. There have been a number of studies to show evidence that PRP is better in humans when injected compared to placebo, but there is no definitive evidence. Specifically, PRP injection does not result in relief consistently 100% of the time and is considered experimental. As such, PRP should not be viewed as curative but as a treatment modality that may offer relief.

## 2023-11-13 NOTE — PROGRESS NOTES
1. Lateral epicondylitis, left elbow  Ambulatory Referral to Orthopedic Surgery    XR elbow 3+ vw left    CBC and differential    Sedimentation rate, automated    Rheumatoid Arthritis Factor    Cyclic citrul peptide antibody, IgG    MARICRUZ Screen w/ Reflex to Titer/Pattern    Lyme Total AB W Reflex to IGM/IGG    TSH, 3rd generation with Free T4 reflex    Lyme Total AB W Reflex to IGM/IGG    TSH, 3rd generation with Free T4 reflex        Orders Placed This Encounter   Procedures    XR elbow 3+ vw left    CBC and differential    Sedimentation rate, automated    Rheumatoid Arthritis Factor    Cyclic citrul peptide antibody, IgG    MARICRUZ Screen w/ Reflex to Titer/Pattern    Lyme Total AB W Reflex to IGM/IGG    TSH, 3rd generation with Free T4 reflex        IMAGING STUDIES: (I personally reviewed images in PACS and report):  Xray left elbow 11/13/23:  No acute osseous abnormality      PAST REPORTS:  EMG 5/18/23 right:  Normal with comparison to left      ASSESSMENT/PLAN:  Left Tennis Elbow  Right Tennis Elbow resolved  PMH: Right bundle branch block, V-Tach non-sustained, congenital heart disease, hx of bppv, arrhythmias, PACEMAKER. High co-pay for PT     Repeat X-ray next visit: None    Return for follow-up for USG PRP at [facility]. Patient instructions below verbally summarized in person during encounter:  Patient Instructions   Tennis elbow (lateral epicondylitis) or its cousin, Golfer’s elbow (medial epicondyltitis), are irritations and inflammations of the anchor points of your forearm muscles at your elbow. Tennis elbow is usually caused by overuse, extending your wrist (pushing wrist up) and golfer’s elbow by flexion of the wrist (pushing wrist down). This takes at least 6 weeks to heal and usually up to 3 months to see complete resolution. Injections help to reduce pain significantly but the pain will return if you do not perform physical therapy. The curative treatment is physical therapy.  Injections, tennis elbow straps, and anti-inflammatories only help to reduce pain. This is a clinical diagnosis and may require xray but MRI is usually not considered until at least 3 months of failed treatment. Surgery is generally reserved for cases which do not improve after 6 months of conservative treatment. (CAN Chna 2017). Theraband flexbar  Begin with red bar, advance to green bar after 3-4 weeks  8-12 reps for 9 sets every other day      PRP stands for Platelet Rich Plasma and is a Injectate solution prepared from a patient's own blood that has a high concentration of platelets. Blood is drawn from the patient in the same way that occurs during a routine blood draw. A special machine then extracts platelet growth factors and other natural chemicals from the patient's own blood that are hypothesized to help healing tissue. PRP has been used to help healing tissues since approximately 1999, and specifically, for arthritis and cartilage problems since 2003. Injection of PRP is recommended under ultrasound to attain visuzlization of needle and injectate into target site. Once injected, PRP begins to clot and within 10 minutes of clotting, the platelets begin to secrete their PDGFs (Platelet derived growth factors) which help to aid healing. Lab studies in petri dishes have shown that these healing factors aid in growth of tissue. There have been a number of studies to show evidence that PRP is better in humans when injected compared to placebo, but there is no definitive evidence. Specifically, PRP injection does not result in relief consistently 100% of the time and is considered experimental. As such, PRP should not be viewed as curative but as a treatment modality that may offer relief.         __________________________________________________________________________    HISTORY OF PRESENT ILLNESS:  Left elbow pain posterolateral. PMH contralateral right elbow pain with tennis elbow improved with PT.  Began therapy at home exercise program for left elbow 2 months with only mild relief. No numbness tingling. No radiatoin pain. No injury. Sharp tolerable but at times severe. Denies neck pain.            Review of Systems      Following history reviewed and update:    Past Medical History:   Diagnosis Date    Atrial fibrillation ([address])     last assessed 03/14/2014    BRCA1 negative     BRCA2 negative     Complete transposition of great vessels     last assessed 02/14/2014    Eczema     GERD (gastroesophageal reflux disease)     History of cardiac cath     Cath RVOT subpulmonary obstruction, last assessed 06/19/2012    Hyperbilirubinemia (congenital)     Migraine     SSS (sick sinus syndrome) ([address])     Torsion of small intestine (HCC)      Past Surgical History:   Procedure Laterality Date    ADENOIDECTOMY      onset childhood    ATRIAL SEPTECTOMY      balloon transvenous method    CARDIAC PACEMAKER PLACEMENT      last assessed 03/14/2014    LAPAROSCOPIC LYSIS INTESTINAL ADHESIONS      OOPHORECTOMY      OTHER SURGICAL HISTORY      transposition repair mustard procedure, description 11 months    TONSILLECTOMY      TUBAL LIGATION      last assessed 08/17/2012    US GUIDED BREAST BIOPSY RIGHT COMPLETE Right 11/18/2019     Social History   Social History     Substance and Sexual Activity   Alcohol Use Yes    Comment: social     Social History     Substance and Sexual Activity   Drug Use No     Social History     Tobacco Use   Smoking Status Never   Smokeless Tobacco Never     Family History   Problem Relation Age of Onset    Cancer Father 61        hodgkins lymphoma     Melanoma Father     Colon cancer Sister 25        carcinoid tumor in the rectum    Heart disease Maternal Grandmother     Hyperlipidemia Maternal Grandmother     Diabetes Paternal Grandmother     Hypertension Paternal Grandmother     Lung cancer Paternal Grandmother     Heart disease Paternal Grandfather     Hyperlipidemia Paternal Grandfather     Lung cancer Paternal Grandfather     No Known Problems Mother     No Known Problems Daughter     Cancer Maternal Grandfather         tongue    Breast cancer Paternal Aunt 46    Ovarian cancer Paternal Aunt 27    No Known Problems Paternal Aunt     No Known Problems Maternal Aunt      Allergies   Allergen Reactions    Enalapril      Reaction Date: [id number];   Pt denies 2/2018  Patient stated it may interact with sotalol. Other      Other reaction(s): Other  Tongue feels funny    Penicillins Hives     Category: Allergy; Physical Exam  /74 (BP Location: Right arm, Patient Position: Sitting, Cuff Size: Standard)   Pulse 76     Constitutional:  see vital signs  Gen: well-developed, normocephalic/atraumatic, well-groomed  Eyes: No inflammation or discharge of conjunctiva or lids; sclera clear   Pharynx: no inflammation, lesion, or mass of lips  Neck: supple, no masses, non-distended  MSK: no inflammation, lesion, mass, or clubbing of nails and digits except for other than mentioned below  SKIN: no visible rashes or skin lesions  Pulmonary/Chest: Effort normal. No respiratory distress. NEURO: cranial nerves grossly intact  PSYCH:  Alert and oriented to person, place, and time; recent and remote memory intact; mood normal, no depression, anxiety, or agitation, judgment and insight good and intact     Left Elbow Exam     Tenderness   The patient is experiencing tenderness in the lateral epicondyle. Range of Motion   The patient has normal left elbow ROM.     Muscle Strength   Pronation:  5/5   Supination:  5/5     Other   Erythema: absent  Sensation: normal    Comments:  Chief complaint of pain reproduced at lateral epicondyle with resisted isometric contraction of wrist and finger extensors            __________________________________________________________________________  Procedures

## 2023-12-24 ENCOUNTER — HOSPITAL ENCOUNTER (OUTPATIENT)
Dept: ULTRASOUND IMAGING | Facility: HOSPITAL | Age: 43
Discharge: HOME | End: 2023-12-24
Payer: COMMERCIAL

## 2023-12-24 DIAGNOSIS — N83.292 OTHER OVARIAN CYST, LEFT SIDE: ICD-10-CM

## 2023-12-24 PROCEDURE — 76830 TRANSVAGINAL US NON-OB: CPT

## 2023-12-24 PROCEDURE — 76856 US EXAM PELVIC COMPLETE: CPT

## 2023-12-25 ENCOUNTER — APPOINTMENT (EMERGENCY)
Dept: CT IMAGING | Facility: HOSPITAL | Age: 43
End: 2023-12-25
Attending: EMERGENCY MEDICINE
Payer: COMMERCIAL

## 2023-12-25 ENCOUNTER — HOSPITAL ENCOUNTER (OUTPATIENT)
Facility: HOSPITAL | Age: 43
Setting detail: OBSERVATION
Discharge: HOME | End: 2023-12-25
Attending: EMERGENCY MEDICINE | Admitting: HOSPITALIST
Payer: COMMERCIAL

## 2023-12-25 ENCOUNTER — APPOINTMENT (EMERGENCY)
Dept: RADIOLOGY | Facility: HOSPITAL | Age: 43
End: 2023-12-25
Payer: COMMERCIAL

## 2023-12-25 VITALS
TEMPERATURE: 97.5 F | HEIGHT: 62 IN | SYSTOLIC BLOOD PRESSURE: 112 MMHG | RESPIRATION RATE: 12 BRPM | BODY MASS INDEX: 25.76 KG/M2 | HEART RATE: 70 BPM | DIASTOLIC BLOOD PRESSURE: 67 MMHG | WEIGHT: 140 LBS | OXYGEN SATURATION: 95 %

## 2023-12-25 DIAGNOSIS — R04.2 HEMOPTYSIS: Primary | ICD-10-CM

## 2023-12-25 DIAGNOSIS — R79.89 ELEVATED SERUM HCG: ICD-10-CM

## 2023-12-25 DIAGNOSIS — J96.01 ACUTE RESPIRATORY FAILURE WITH HYPOXIA (HCC): ICD-10-CM

## 2023-12-25 PROBLEM — I49.3 PVCS (PREMATURE VENTRICULAR CONTRACTIONS): Status: ACTIVE | Noted: 2023-12-25

## 2023-12-25 LAB
2HR DELTA HS TROPONIN: 23 NG/L
4HR DELTA HS TROPONIN: 27 NG/L
ALBUMIN SERPL BCP-MCNC: 4.5 G/DL (ref 3.5–5)
ALP SERPL-CCNC: 65 U/L (ref 34–104)
ALT SERPL W P-5'-P-CCNC: 14 U/L (ref 7–52)
ANION GAP SERPL CALCULATED.3IONS-SCNC: 7 MMOL/L
ANION GAP SERPL CALCULATED.3IONS-SCNC: 7 MMOL/L
APTT PPP: 29 SECONDS (ref 23–37)
AST SERPL W P-5'-P-CCNC: 16 U/L (ref 13–39)
B-HCG SERPL-ACNC: 6 MIU/ML (ref 0–5)
BASOPHILS # BLD AUTO: 0.02 THOUSANDS/ÂΜL (ref 0–0.1)
BASOPHILS # BLD AUTO: 0.02 THOUSANDS/ÂΜL (ref 0–0.1)
BASOPHILS NFR BLD AUTO: 0 % (ref 0–1)
BASOPHILS NFR BLD AUTO: 0 % (ref 0–1)
BILIRUB SERPL-MCNC: 1.11 MG/DL (ref 0.2–1)
BNP SERPL-MCNC: 91 PG/ML (ref 0–100)
BUN SERPL-MCNC: 16 MG/DL (ref 5–25)
BUN SERPL-MCNC: 18 MG/DL (ref 5–25)
CALCIUM SERPL-MCNC: 9.4 MG/DL (ref 8.4–10.2)
CALCIUM SERPL-MCNC: 9.5 MG/DL (ref 8.4–10.2)
CARDIAC TROPONIN I PNL SERPL HS: 10 NG/L
CARDIAC TROPONIN I PNL SERPL HS: 33 NG/L
CARDIAC TROPONIN I PNL SERPL HS: 37 NG/L
CHLORIDE SERPL-SCNC: 104 MMOL/L (ref 96–108)
CHLORIDE SERPL-SCNC: 107 MMOL/L (ref 96–108)
CO2 SERPL-SCNC: 24 MMOL/L (ref 21–32)
CO2 SERPL-SCNC: 26 MMOL/L (ref 21–32)
CREAT SERPL-MCNC: 0.65 MG/DL (ref 0.6–1.3)
CREAT SERPL-MCNC: 0.76 MG/DL (ref 0.6–1.3)
D DIMER PPP FEU-MCNC: <0.27 UG/ML FEU
EOSINOPHIL # BLD AUTO: 0.11 THOUSAND/ÂΜL (ref 0–0.61)
EOSINOPHIL # BLD AUTO: 0.41 THOUSAND/ÂΜL (ref 0–0.61)
EOSINOPHIL NFR BLD AUTO: 1 % (ref 0–6)
EOSINOPHIL NFR BLD AUTO: 6 % (ref 0–6)
ERYTHROCYTE [DISTWIDTH] IN BLOOD BY AUTOMATED COUNT: 12.1 % (ref 11.6–15.1)
ERYTHROCYTE [DISTWIDTH] IN BLOOD BY AUTOMATED COUNT: 12.2 % (ref 11.6–15.1)
EXT PREGNANCY TEST URINE: NEGATIVE
EXT. CONTROL: NORMAL
FLUAV RNA RESP QL NAA+PROBE: NEGATIVE
FLUBV RNA RESP QL NAA+PROBE: NEGATIVE
GFR SERPL CREATININE-BSD FRML MDRD: 110 ML/MIN/1.73SQ M
GFR SERPL CREATININE-BSD FRML MDRD: 97 ML/MIN/1.73SQ M
GLUCOSE P FAST SERPL-MCNC: 119 MG/DL (ref 65–99)
GLUCOSE SERPL-MCNC: 119 MG/DL (ref 65–140)
GLUCOSE SERPL-MCNC: 139 MG/DL (ref 65–140)
HCG SERPL QL: ABNORMAL
HCT VFR BLD AUTO: 40.1 % (ref 34.8–46.1)
HCT VFR BLD AUTO: 40.4 % (ref 34.8–46.1)
HCT VFR BLD AUTO: 41.9 % (ref 34.8–46.1)
HGB BLD-MCNC: 13.3 G/DL (ref 11.5–15.4)
HGB BLD-MCNC: 13.5 G/DL (ref 11.5–15.4)
HGB BLD-MCNC: 14.1 G/DL (ref 11.5–15.4)
IMM GRANULOCYTES # BLD AUTO: 0.02 THOUSAND/UL (ref 0–0.2)
IMM GRANULOCYTES # BLD AUTO: 0.09 THOUSAND/UL (ref 0–0.2)
IMM GRANULOCYTES NFR BLD AUTO: 0 % (ref 0–2)
IMM GRANULOCYTES NFR BLD AUTO: 1 % (ref 0–2)
INR PPP: 0.95 (ref 0.84–1.19)
LYMPHOCYTES # BLD AUTO: 1.24 THOUSANDS/ÂΜL (ref 0.6–4.47)
LYMPHOCYTES # BLD AUTO: 2.56 THOUSANDS/ÂΜL (ref 0.6–4.47)
LYMPHOCYTES NFR BLD AUTO: 10 % (ref 14–44)
LYMPHOCYTES NFR BLD AUTO: 34 % (ref 14–44)
MCH RBC QN AUTO: 30 PG (ref 26.8–34.3)
MCH RBC QN AUTO: 30.1 PG (ref 26.8–34.3)
MCHC RBC AUTO-ENTMCNC: 33.7 G/DL (ref 31.4–37.4)
MCHC RBC AUTO-ENTMCNC: 33.7 G/DL (ref 31.4–37.4)
MCV RBC AUTO: 89 FL (ref 82–98)
MCV RBC AUTO: 89 FL (ref 82–98)
MONOCYTES # BLD AUTO: 0.69 THOUSAND/ÂΜL (ref 0.17–1.22)
MONOCYTES # BLD AUTO: 0.79 THOUSAND/ÂΜL (ref 0.17–1.22)
MONOCYTES NFR BLD AUTO: 6 % (ref 4–12)
MONOCYTES NFR BLD AUTO: 9 % (ref 4–12)
NEUTROPHILS # BLD AUTO: 10.27 THOUSANDS/ÂΜL (ref 1.85–7.62)
NEUTROPHILS # BLD AUTO: 3.75 THOUSANDS/ÂΜL (ref 1.85–7.62)
NEUTS SEG NFR BLD AUTO: 51 % (ref 43–75)
NEUTS SEG NFR BLD AUTO: 82 % (ref 43–75)
NRBC BLD AUTO-RTO: 0 /100 WBCS
NRBC BLD AUTO-RTO: 0 /100 WBCS
PLATELET # BLD AUTO: 187 THOUSANDS/UL (ref 149–390)
PLATELET # BLD AUTO: 219 THOUSANDS/UL (ref 149–390)
PMV BLD AUTO: 10.5 FL (ref 8.9–12.7)
PMV BLD AUTO: 10.9 FL (ref 8.9–12.7)
POTASSIUM SERPL-SCNC: 3.6 MMOL/L (ref 3.5–5.3)
POTASSIUM SERPL-SCNC: 4.1 MMOL/L (ref 3.5–5.3)
PROT SERPL-MCNC: 7.3 G/DL (ref 6.4–8.4)
PROTHROMBIN TIME: 13.1 SECONDS (ref 11.6–14.5)
RBC # BLD AUTO: 4.5 MILLION/UL (ref 3.81–5.12)
RBC # BLD AUTO: 4.69 MILLION/UL (ref 3.81–5.12)
RSV RNA RESP QL NAA+PROBE: NEGATIVE
SARS-COV-2 RNA RESP QL NAA+PROBE: NEGATIVE
SODIUM SERPL-SCNC: 137 MMOL/L (ref 135–147)
SODIUM SERPL-SCNC: 138 MMOL/L (ref 135–147)
WBC # BLD AUTO: 12.52 THOUSAND/UL (ref 4.31–10.16)
WBC # BLD AUTO: 7.45 THOUSAND/UL (ref 4.31–10.16)

## 2023-12-25 PROCEDURE — 99285 EMERGENCY DEPT VISIT HI MDM: CPT | Performed by: EMERGENCY MEDICINE

## 2023-12-25 PROCEDURE — 81025 URINE PREGNANCY TEST: CPT

## 2023-12-25 PROCEDURE — 85014 HEMATOCRIT: CPT | Performed by: STUDENT IN AN ORGANIZED HEALTH CARE EDUCATION/TRAINING PROGRAM

## 2023-12-25 PROCEDURE — 0241U HB NFCT DS VIR RESP RNA 4 TRGT: CPT | Performed by: EMERGENCY MEDICINE

## 2023-12-25 PROCEDURE — 36415 COLL VENOUS BLD VENIPUNCTURE: CPT | Performed by: EMERGENCY MEDICINE

## 2023-12-25 PROCEDURE — 85610 PROTHROMBIN TIME: CPT | Performed by: EMERGENCY MEDICINE

## 2023-12-25 PROCEDURE — 84702 CHORIONIC GONADOTROPIN TEST: CPT | Performed by: EMERGENCY MEDICINE

## 2023-12-25 PROCEDURE — 84484 ASSAY OF TROPONIN QUANT: CPT | Performed by: INTERNAL MEDICINE

## 2023-12-25 PROCEDURE — 99223 1ST HOSP IP/OBS HIGH 75: CPT | Performed by: INTERNAL MEDICINE

## 2023-12-25 PROCEDURE — 85025 COMPLETE CBC W/AUTO DIFF WBC: CPT | Performed by: INTERNAL MEDICINE

## 2023-12-25 PROCEDURE — 71275 CT ANGIOGRAPHY CHEST: CPT

## 2023-12-25 PROCEDURE — G1004 CDSM NDSC: HCPCS

## 2023-12-25 PROCEDURE — 84703 CHORIONIC GONADOTROPIN ASSAY: CPT | Performed by: INTERNAL MEDICINE

## 2023-12-25 PROCEDURE — 85730 THROMBOPLASTIN TIME PARTIAL: CPT | Performed by: EMERGENCY MEDICINE

## 2023-12-25 PROCEDURE — 84484 ASSAY OF TROPONIN QUANT: CPT | Performed by: EMERGENCY MEDICINE

## 2023-12-25 PROCEDURE — 80048 BASIC METABOLIC PNL TOTAL CA: CPT | Performed by: INTERNAL MEDICINE

## 2023-12-25 PROCEDURE — 93005 ELECTROCARDIOGRAM TRACING: CPT

## 2023-12-25 PROCEDURE — 85025 COMPLETE CBC W/AUTO DIFF WBC: CPT | Performed by: EMERGENCY MEDICINE

## 2023-12-25 PROCEDURE — RECHECK: Performed by: STUDENT IN AN ORGANIZED HEALTH CARE EDUCATION/TRAINING PROGRAM

## 2023-12-25 PROCEDURE — 83880 ASSAY OF NATRIURETIC PEPTIDE: CPT | Performed by: INTERNAL MEDICINE

## 2023-12-25 PROCEDURE — 71045 X-RAY EXAM CHEST 1 VIEW: CPT

## 2023-12-25 PROCEDURE — 85379 FIBRIN DEGRADATION QUANT: CPT | Performed by: EMERGENCY MEDICINE

## 2023-12-25 PROCEDURE — 99284 EMERGENCY DEPT VISIT MOD MDM: CPT

## 2023-12-25 PROCEDURE — 80053 COMPREHEN METABOLIC PANEL: CPT | Performed by: EMERGENCY MEDICINE

## 2023-12-25 PROCEDURE — 85018 HEMOGLOBIN: CPT | Performed by: STUDENT IN AN ORGANIZED HEALTH CARE EDUCATION/TRAINING PROGRAM

## 2023-12-25 RX ORDER — MELATONIN
1000 DAILY
Status: DISCONTINUED | OUTPATIENT
Start: 2023-12-25 | End: 2023-12-25 | Stop reason: HOSPADM

## 2023-12-25 RX ORDER — SOTALOL HYDROCHLORIDE 120 MG/1
120 TABLET ORAL EVERY 12 HOURS SCHEDULED
Status: DISCONTINUED | OUTPATIENT
Start: 2023-12-25 | End: 2023-12-25 | Stop reason: HOSPADM

## 2023-12-25 RX ORDER — METOPROLOL SUCCINATE 25 MG/1
25 TABLET, EXTENDED RELEASE ORAL DAILY
Status: DISCONTINUED | OUTPATIENT
Start: 2023-12-25 | End: 2023-12-25 | Stop reason: HOSPADM

## 2023-12-25 RX ORDER — ACETAMINOPHEN 325 MG/1
650 TABLET ORAL EVERY 6 HOURS PRN
Status: DISCONTINUED | OUTPATIENT
Start: 2023-12-25 | End: 2023-12-25 | Stop reason: HOSPADM

## 2023-12-25 RX ORDER — FUROSEMIDE 20 MG/1
20 TABLET ORAL DAILY
Qty: 7 TABLET | Refills: 0 | Status: SHIPPED | OUTPATIENT
Start: 2023-12-25 | End: 2024-01-01

## 2023-12-25 RX ORDER — FUROSEMIDE 10 MG/ML
40 INJECTION INTRAMUSCULAR; INTRAVENOUS ONCE
Status: COMPLETED | OUTPATIENT
Start: 2023-12-25 | End: 2023-12-25

## 2023-12-25 RX ORDER — LORATADINE 10 MG/1
10 TABLET ORAL DAILY
Status: DISCONTINUED | OUTPATIENT
Start: 2023-12-25 | End: 2023-12-25 | Stop reason: HOSPADM

## 2023-12-25 RX ADMIN — Medication 1000 UNITS: at 08:25

## 2023-12-25 RX ADMIN — FUROSEMIDE 40 MG: 10 INJECTION, SOLUTION INTRAMUSCULAR; INTRAVENOUS at 13:13

## 2023-12-25 RX ADMIN — SOTALOL HYDROCHLORIDE 120 MG: 120 TABLET ORAL at 08:26

## 2023-12-25 RX ADMIN — IOHEXOL 70 ML: 350 INJECTION, SOLUTION INTRAVENOUS at 02:15

## 2023-12-25 RX ADMIN — MULTIPLE VITAMINS W/ MINERALS TAB 1 TABLET: TAB ORAL at 08:26

## 2023-12-25 RX ADMIN — LORATADINE 10 MG: 10 TABLET ORAL at 08:26

## 2023-12-25 NOTE — ASSESSMENT & PLAN NOTE
Presents from home due to an episode of hemoptysis.  Per patient was feeling her normal self prior to going to bed woke up coughing and noticed her sputum was pinkish/red.  Denies clots.   Initially in the ER patient short of breath and hypoxic into the 80s.  Placed on 2 L nasal cannula.   Received no medication/intervention and able to be weaned to room air.   Takes aspirin 81 mg daily due to prior cardiac history.  No other blood thinners  Aspirin was held on admission-> continued on discharge  CTA chest   No evidence of pulmonary embolus.  Findings of interstitial pulmonary edema.   Postsurgical repair of D-transposition of the great vessels.  COVID/flu/RSV negative  Hemoglobin WNL  Sputum culture ordered  Has had no further episodes  Repeat Hb stable  Given 1x dose of IV lasix 40  Discharge with lasix 20 qd  She is to f/u with her primary cardiology team to obtain new echo and evaluation of Pulm edema  Recommending discharge with a 2L fluid restriction

## 2023-12-25 NOTE — ASSESSMENT & PLAN NOTE
Presents from home due to an episode of hemoptysis.  Per patient was feeling her normal self prior to going to bed woke up coughing and noticed her sputum was pinkish/red.  Denies clots.   Initially in the ER patient short of breath and hypoxic into the 80s.  Placed on 2 L nasal cannula.   Received no medication/intervention and able to be weaned to room air.   Takes aspirin 81 mg daily due to prior cardiac history.  No other blood thinners  Hold home aspirin  CTA chest   No evidence of pulmonary embolus.  Findings of interstitial pulmonary edema.   Postsurgical repair of D-transposition of the great vessels.  COVID/flu/RSV negative  Hemoglobin WNL  Sputum culture ordered  Continue to monitor for further episodes.  If no further episodes safe to be discharged later today

## 2023-12-25 NOTE — DISCHARGE SUMMARY
[facility]  Discharge- Morenita Jensen 1980, 43 y.o. female MRN: [id number]  Unit/Bed#: -01 Encounter: [id number]  Primary Care Provider: Genet Lopez DO   Date and time admitted to hospital: 12/25/2023 12:47 AM    * Hemoptysis  Assessment & Plan  Presents from home due to an episode of hemoptysis.  Per patient was feeling her normal self prior to going to bed woke up coughing and noticed her sputum was pinkish/red.  Denies clots.   Initially in the ER patient short of breath and hypoxic into the 80s.  Placed on 2 L nasal cannula.   Received no medication/intervention and able to be weaned to room air.   Takes aspirin 81 mg daily due to prior cardiac history.  No other blood thinners  Aspirin was held on admission-> continued on discharge  CTA chest   No evidence of pulmonary embolus.  Findings of interstitial pulmonary edema.   Postsurgical repair of D-transposition of the great vessels.  COVID/flu/RSV negative  Hemoglobin WNL  Sputum culture ordered  Has had no further episodes  Repeat Hb stable  Given 1x dose of IV lasix 40  Discharge with lasix 20 qd  She is to f/u with her primary cardiology team to obtain new echo and evaluation of Pulm edema  Recommending discharge with a 2L fluid restriction    Elevated serum hCG  Assessment & Plan  hCG minimally elevated at 6  History of tubal ligation  repeat stating borderline  Repeat in 1 week    Sinoatrial node dysfunction (HCC)  Assessment & Plan  S/P pacemaker   Continue home metoprolol and sotalol  Follows with [facility]ology at [facility]    History of complete transposition of great vessels  Assessment & Plan  Follows with [facility]  Postsurgical repair noted on CT imaging.         Medical Problems       Resolved Problems  Date Reviewed: 12/25/2023   None       Discharging Physician / Practitioner: Alannah Ivan MD  PCP: Genet Lopez DO  Admission Date:   Admission Orders (From admission, onward)       Ordered        12/25/23 0425   Place in Observation  Once                          Discharge Date: 12/25/23    Consultations During Hospital Stay:  none    Procedures Performed:   CTA ED chest PE Study   Final Result      No evidence of pulmonary embolus.      Findings of interstitial pulmonary edema. Postsurgical repair of D-transposition of the great vessels.            Workstation performed: [id number]         XR chest portable   Final Result   Interval development of mild vascular congestion               Workstation performed: [id number]               Significant Findings / Test Results:   See above    Incidental Findings:   none       Test Results Pending at Discharge (will require follow up):   none     Outpatient Tests Requested:  Serum hcg    Complications:  none known at this time    Reason for Admission: Hemoptysis    Hospital Course:   Morenita Jensen is a 43 y.o. female patient who originally presented to the hospital on 12/25/2023 due to having a coughing fit with mucus production and was found to be pinkish-red in color.  In the ED she initially required 2 L nasal cannula and was weaned to room air shortly after.  CTA chest showed interstitial pulmonary edema.  Repeat hemoglobin was within normal limits.  Patient had no further episodes.  Was given a one-time dose of IV Lasix 40 mg.  Due to her history of congenital heart condition recommending that she follow-up with her primary cardiology team to obtain a new echo and evaluation of pulmonary edema.  She is otherwise remained hemodynamically stable afebrile and on room air with no acute respiratory distress.  As previously stated she is to follow-up with her primary cardiology team to obtain a new echo and evaluation of pulmonary edema she will be discharged with 7 days of Lasix 20 mg daily.  She is to continue to take ASA unless told otherwise by her cardiologist.  She has been medically cleared for discharge.        Please see above list of diagnoses and related plan for  "additional information.     Condition at Discharge: stable    Discharge Day Visit / Exam:   Subjective:  Morenita was seen and examined at bedside.  No acute events overnight.  Discussed plan of care.  All questions and concerns were answered and addressed.  She has had no further episodes.  She will receive a one-time dose of Lasix IV.  She will take Lasix orally starting tomorrow.  Recommending that she follow-up with her PCP and primary cardiology team.  Vitals: Blood Pressure: 107/61 (12/25/23 1317)  Pulse: 71 (12/25/23 1317)  Temperature: 97.7 °F (36.5 °C) (12/25/23 0514)  Temp Source: Temporal (12/25/23 0052)  Respirations: 12 (12/25/23 0816)  Height: 5' 2\" (157.5 cm) (12/25/23 0052)  Weight - Scale: 63.5 kg (140 lb) (12/25/23 0052)  SpO2: 96 % (12/25/23 1317)  Exam:   Physical Exam  Vitals and nursing note reviewed.   Constitutional:       General: She is not in acute distress.     Appearance: She is not ill-appearing.   HENT:      Head: Normocephalic and atraumatic.   Cardiovascular:      Rate and Rhythm: Normal rate and regular rhythm.      Pulses: Normal pulses.      Heart sounds: Normal heart sounds.   Pulmonary:      Effort: Pulmonary effort is normal.      Breath sounds: Normal breath sounds.   Abdominal:      General: Abdomen is flat. Bowel sounds are normal.      Palpations: Abdomen is soft.   Musculoskeletal:      Right lower leg: No edema.      Left lower leg: No edema.   Skin:     General: Skin is warm.   Neurological:      General: No focal deficit present.      Mental Status: She is alert and oriented to person, place, and time.          Discussion with Family: Updated  () at bedside.    Discharge instructions/Information to patient and family:   See after visit summary for information provided to patient and family.      Provisions for Follow-Up Care:  See after visit summary for information related to follow-up care and any pertinent home health orders.      Mobility at time " of Discharge:   Basic Mobility Inpatient Raw Score: 24  -HLM Goal: 8: Walk 250 feet or more  HLM Goal achieved. Continue to encourage appropriate mobility.     Disposition:   Home    Planned Readmission: no     Discharge Statement:  I spent 45 minutes discharging the patient. This time was spent on the day of discharge. I had direct contact with the patient on the day of discharge. Greater than 50% of the total time was spent examining patient, answering all patient questions, arranging and discussing plan of care with patient as well as directly providing post-discharge instructions.  Additional time then spent on discharge activities.    Discharge Medications:  See after visit summary for reconciled discharge medications provided to patient and/or family.      **Please Note: This note may have been constructed using a voice recognition system**       Pt. found by family with above complaints, + pallor, +gurgling and unable to speak. Pt. lost conciousness, rapid response called and code blue called immediately after.

## 2023-12-25 NOTE — PLAN OF CARE
Problem: PAIN - ADULT  Goal: Verbalizes/displays adequate comfort level or baseline comfort level  Description: Interventions:  - Encourage patient to monitor pain and request assistance  - Assess pain using appropriate pain scale  - Administer analgesics based on type and severity of pain and evaluate response  - Implement non-pharmacological measures as appropriate and evaluate response  - Consider cultural and social influences on pain and pain management  - Notify physician/advanced practitioner if interventions unsuccessful or patient reports new pain  Outcome: Progressing     Problem: INFECTION - ADULT  Goal: Absence or prevention of progression during hospitalization  Description: INTERVENTIONS:  - Assess and monitor for signs and symptoms of infection  - Monitor lab/diagnostic results  - Monitor all insertion sites, i.e. indwelling lines, tubes, and drains  - Monitor endotracheal if appropriate and nasal secretions for changes in amount and color  - [place] appropriate cooling/warming therapies per order  - Administer medications as ordered  - Instruct and encourage patient and family to use good hand hygiene technique  - Identify and instruct in appropriate isolation precautions for identified infection/condition  Outcome: Progressing  Goal: Absence of fever/infection during neutropenic period  Description: INTERVENTIONS:  - Monitor WBC    Outcome: Progressing     Problem: SAFETY ADULT  Goal: Patient will remain free of falls  Description: INTERVENTIONS:  - Educate patient/family on patient safety including physical limitations  - Instruct patient to call for assistance with activity   - Consult OT/PT to assist with strengthening/mobility   - Keep Call bell within reach  - Keep bed low and locked with side rails adjusted as appropriate  - Keep care items and personal belongings within reach  - Initiate and maintain comfort rounds  - Make Fall Risk Sign visible to staff  - Offer Toileting every x Hours,  in advance of need  - Initiate/Maintain xalarm  - Obtain necessary fall risk management equipment: x  - Apply yellow socks and bracelet for high fall risk patients  - Consider moving patient to room near nurses station  Outcome: Progressing  Goal: Maintain or return to baseline ADL function  Description: INTERVENTIONS:  -  Assess patient's ability to carry out ADLs; assess patient's baseline for ADL function and identify physical deficits which impact ability to perform ADLs (bathing, care of mouth/teeth, toileting, grooming, dressing, etc.)  - Assess/evaluate cause of self-care deficits   - Assess range of motion  - Assess patient's mobility; develop plan if impaired  - Assess patient's need for assistive devices and provide as appropriate  - Encourage maximum independence but intervene and supervise when necessary  - Involve family in performance of ADLs  - Assess for home care needs following discharge   - Consider OT consult to assist with ADL evaluation and planning for discharge  - Provide patient education as appropriate  Outcome: Progressing  Goal: Maintains/Returns to pre admission functional level  Description: INTERVENTIONS:  - Perform AM-PAC 6 Click Basic Mobility/ Daily Activity assessment daily.  - Set and communicate daily mobility goal to care team and patient/family/caregiver.   - Collaborate with rehabilitation services on mobility goals if consulted  - Perform Range of Motion x times a day.  - Reposition patient every xx hours.  - Dangle patient x times a day  - Stand patient x times a day  - Ambulate patient x times a day  - Out of bed to chair x times a day   - Out of bed for meals xxxx times a day  - Out of bed for toileting  - Record patient progress and toleration of activity level   Outcome: Progressing     Problem: DISCHARGE PLANNING  Goal: Discharge to home or other facility with appropriate resources  Description: INTERVENTIONS:  - Identify barriers to discharge w/patient and caregiver  -  Arrange for needed discharge resources and transportation as appropriate  - Identify discharge learning needs (meds, wound care, etc.)  - Arrange for interpretive services to assist at discharge as needed  - Refer to Case Management Department for coordinating discharge planning if the patient needs post-hospital services based on physician/advanced practitioner order or complex needs related to functional status, cognitive ability, or social support system  Outcome: Progressing     Problem: Nutrition/Hydration-ADULT  Goal: Nutrient/Hydration intake appropriate for improving, restoring or maintaining nutritional needs  Description: Monitor and assess patient's nutrition/hydration status for malnutrition. Collaborate with interdisciplinary team and initiate plan and interventions as ordered.  Monitor patient's weight and dietary intake as ordered or per policy. Utilize nutrition screening tool and intervene as necessary. Determine patient's food preferences and provide high-protein, high-caloric foods as appropriate.     INTERVENTIONS:  - Monitor oral intake, urinary output, labs, and treatment plans  - Assess nutrition and hydration status and recommend course of action  - Evaluate amount of meals eaten  - Assist patient with eating if necessary   - Allow adequate time for meals  - Recommend/ encourage appropriate diets, oral nutritional supplements, and vitamin/mineral supplements  - Order, calculate, and assess calorie counts as needed  - Recommend, monitor, and adjust tube feedings and TPN/PPN based on assessed needs  - Assess need for intravenous fluids  - Provide specific nutrition/hydration education as appropriate  - Include patient/family/caregiver in decisions related to nutrition  Outcome: Progressing     Problem: RESPIRATORY - ADULT  Goal: Achieves optimal ventilation and oxygenation  Description: INTERVENTIONS:  - Assess for changes in respiratory status  - Assess for changes in mentation and behavior  -  Position to facilitate oxygenation and minimize respiratory effort  - Oxygen administered by appropriate delivery if ordered  - Initiate smoking cessation education as indicated  - Encourage broncho-pulmonary hygiene including cough, deep breathe, Incentive Spirometry  - Assess the need for suctioning and aspirate as needed  - Assess and instruct to report SOB or any respiratory difficulty  - Respiratory Therapy support as indicated  Outcome: Progressing

## 2023-12-25 NOTE — H&P
[facility]  H&P  Name: Morenita Jensen 43 y.o. female I MRN: [id number]  Unit/Bed#: -01 I Date of Admission: 12/25/2023   Date of Service: 12/25/2023 I Hospital Day: 0      Assessment/Plan   * Hemoptysis  Assessment & Plan  Presents from home due to an episode of hemoptysis.  Per patient was feeling her normal self prior to going to bed woke up coughing and noticed her sputum was pinkish/red.  Denies clots.   Initially in the ER patient short of breath and hypoxic into the 80s.  Placed on 2 L nasal cannula.   Received no medication/intervention and able to be weaned to room air.   Takes aspirin 81 mg daily due to prior cardiac history.  No other blood thinners  Hold home aspirin  CTA chest   No evidence of pulmonary embolus.  Findings of interstitial pulmonary edema.   Postsurgical repair of D-transposition of the great vessels.  COVID/flu/RSV negative  Hemoglobin WNL  Sputum culture ordered  Continue to monitor for further episodes.  If no further episodes safe to be discharged later today    Elevated serum hCG  Assessment & Plan  hCG minimally elevated at 6  History of tubal ligation  ED had reached out to Gyn who recommended repeating the lab later today.  Suspect false positive test    Sinoatrial node dysfunction (HCC)  Assessment & Plan  S/P pacemaker   Continue home metoprolol and sotalol  Follows with cardiology at [facility]    History of complete transposition of great vessels  Assessment & Plan  Follows with [facility]  Postsurgical repair noted on CT imaging.            VTE Pharmacologic Prophylaxis: VTE Score: 2 Low Risk (Score 0-2) - Encourage Ambulation.  Code Status: Level 1 - Full Code   Discussion with family: Patient declined call to .     Anticipated Length of Stay: Patient will be admitted on an observation basis with an anticipated length of stay of less than 2 midnights secondary to mopped assist.    Total Time Spent on Date of Encounter in care of  patient: 55 mins. This time was spent on one or more of the following: performing physical exam; counseling and coordination of care; obtaining or reviewing history; documenting in the medical record; reviewing/ordering tests, medications or procedures; communicating with other healthcare professionals and discussing with patient's family/caregivers.    Chief Complaint: Coughed up blood    History of Present Illness:  Morenita Jensen is a 43 y.o. female with a PMH of complete transposition of great vessels, sinoatrial node dysfunction with pacemaker who presents from home due to an episode of hemoptysis.  Per patient she was feeling her normal self prior to going to bed and then woke up coughing.  She got a tissue to cough into because she had some phlegm and noticed it was pinkish/red in color.  Denies clots.  Felt short of breath thus came into the ER.  Initially in the ER hypoxic into the 80s.  Placed on 2 L nasal cannula.  Workup unremarkable.  Negative CTA chest, COVID/flu/RSV and labs WNL.  Patient was able to be weaned down to room air without any intervention.  No further episodes in the ER.     Review of Systems:  Review of Systems   Constitutional:  Negative for fatigue and fever.   HENT:  Negative for sore throat.    Respiratory:  Positive for cough (Hemoptysis). Negative for chest tightness and shortness of breath.    Cardiovascular:  Negative for chest pain.   Gastrointestinal:  Negative for abdominal distention, abdominal pain, diarrhea, nausea and vomiting.   Genitourinary:  Negative for difficulty urinating.   Musculoskeletal:  Negative for arthralgias.   Neurological:  Negative for weakness and headaches.   Psychiatric/Behavioral:  Negative for agitation and behavioral problems.    All other systems reviewed and are negative.      Past Medical and Surgical History:   Past Medical History:   Diagnosis Date    Atrial fibrillation (HCC)     last assessed 03/14/2014    BRCA1 negative     BRCA2  negative     Complete transposition of great vessels     last assessed 02/14/2014    Eczema     GERD (gastroesophageal reflux disease)     History of cardiac cath     Cath RVOT subpulmonary obstruction, last assessed 06/19/2012    Hyperbilirubinemia (congenital)     Migraine     SSS (sick sinus syndrome) (HCC)     Torsion of small intestine (HCC)        Past Surgical History:   Procedure Laterality Date    ADENOIDECTOMY      onset childhood    ATRIAL SEPTECTOMY      balloon transvenous method    CARDIAC PACEMAKER PLACEMENT      last assessed 03/14/2014    LAPAROSCOPIC LYSIS INTESTINAL ADHESIONS      OOPHORECTOMY      OTHER SURGICAL HISTORY      transposition repair mustard procedure, description 11 months    TONSILLECTOMY      TUBAL LIGATION      last assessed 08/17/2012    US GUIDED BREAST BIOPSY RIGHT COMPLETE Right 11/18/2019       Meds/Allergies:  Prior to Admission medications    Medication Sig Start Date End Date Taking? Authorizing Provider   ascorbic acid (VITAMIN C) 500 mg tablet Take by mouth    Historical Provider, MD   aspirin (ECOTRIN LOW STRENGTH) 81 mg EC tablet Take 81 mg by mouth daily 8/20/13   Historical Provider, MD   betamethasone, augmented, (DIPROLENE) 0.05 % ointment Apply topically 2 (two) times a day 4/12/23   Genet Lopez DO   cetirizine (ZyrTEC) 10 mg tablet Take 10 mg by mouth daily    Historical Provider, MD   Cholecalciferol 25 MCG (1000 UT) tablet Take 1,000 Units by mouth daily    Historical Provider, MD   Cyanocobalamin (VITAMIN B 12 PO) Take by mouth    Historical Provider, MD   ELDERBERRY PO Take by mouth    Historical Provider, MD   meclizine (ANTIVERT) 25 mg tablet Take 1 tablet (25 mg total) by mouth every 8 (eight) hours as needed for dizziness 12/2/22   Genet Lopez DO   metoprolol succinate (TOPROL-XL) 25 mg 24 hr tablet Take 25 mg by mouth daily 11/12/21   Historical Provider, MD   mometasone (ELOCON) 0.1 % cream Apply topically daily 4/12/23   Genet Lopez DO   MULTIPLE  VITAMINS-MINERALS PO Take 1 tablet by mouth daily    Historical Provider, MD   sotalol (BETAPACE) 120 mg tablet Take 1 tablet by mouth 2 (two) times a day 10/13/18   Historical Provider, MD   meloxicam (Mobic) 15 mg tablet Take 1 tablet (15 mg total) by mouth daily 1/27/23 12/25/23  SAE Cullen     I have reviewed home medications with patient personally.    Allergies:   Allergies   Allergen Reactions    Enalapril      Reaction Date: 14Apr2011;   Pt denies 2/2018  Patient stated it may interact with sotalol.    Other      Other reaction(s): Other  Tongue feels funny    Penicillins Hives     Category: Allergy;        Social History:  Marital Status: /Civil Union   Occupation: Unknown  Patient Pre-hospital Living Situation: Home  Patient Pre-hospital Level of Mobility: walks  Patient Pre-hospital Diet Restrictions: Regular  Substance Use History:   Social History     Substance and Sexual Activity   Alcohol Use Yes    Comment: social     Social History     Tobacco Use   Smoking Status Never   Smokeless Tobacco Never     Social History     Substance and Sexual Activity   Drug Use No       Family History:  Family History   Problem Relation Age of Onset    Cancer Father 63        hodgkins lymphoma     Melanoma Father     Colon cancer Sister 22        carcinoid tumor in the rectum    Heart disease Maternal Grandmother     Hyperlipidemia Maternal Grandmother     Diabetes Paternal Grandmother     Hypertension Paternal Grandmother     Lung cancer Paternal Grandmother     Heart disease Paternal Grandfather     Hyperlipidemia Paternal Grandfather     Lung cancer Paternal Grandfather     No Known Problems Mother     No Known Problems Daughter     Cancer Maternal Grandfather         tongue    Breast cancer Paternal Aunt 52    Ovarian cancer Paternal Aunt 30    No Known Problems Paternal Aunt     No Known Problems Maternal Aunt        Physical Exam:     Vitals:   Blood Pressure: 112/60 (12/25/23 0514)  Pulse:  "74 (12/25/23 0514)  Temperature: 97.7 °F (36.5 °C) (12/25/23 0514)  Temp Source: Temporal (12/25/23 0052)  Respirations: 21 (12/25/23 0400)  Height: 5' 2\" (157.5 cm) (12/25/23 0052)  Weight - Scale: 63.5 kg (140 lb) (12/25/23 0052)  SpO2: 96 % (12/25/23 0514)    Physical Exam  Vitals and nursing note reviewed.   Constitutional:       Appearance: Normal appearance.   HENT:      Head: Normocephalic.   Eyes:      Extraocular Movements: Extraocular movements intact.      Pupils: Pupils are equal, round, and reactive to light.   Cardiovascular:      Rate and Rhythm: Normal rate and regular rhythm.      Heart sounds: No murmur heard.  Pulmonary:      Effort: Pulmonary effort is normal. No respiratory distress.      Breath sounds: Normal breath sounds. No wheezing.   Abdominal:      General: Bowel sounds are normal. There is no distension.      Tenderness: There is no abdominal tenderness. There is no guarding.   Musculoskeletal:         General: Normal range of motion.      Cervical back: Normal range of motion.      Right lower leg: No edema.      Left lower leg: No edema.   Skin:     General: Skin is warm.   Neurological:      General: No focal deficit present.      Mental Status: She is alert and oriented to person, place, and time.   Psychiatric:         Mood and Affect: Mood normal.         Behavior: Behavior normal.         Thought Content: Thought content normal.          Additional Data:     Lab Results:  Results from last 7 days   Lab Units 12/25/23  0539   WBC Thousand/uL 12.52*   HEMOGLOBIN g/dL 13.5   HEMATOCRIT % 40.1   PLATELETS Thousands/uL 187   NEUTROS PCT % 82*   LYMPHS PCT % 10*   MONOS PCT % 6   EOS PCT % 1     Results from last 7 days   Lab Units 12/25/23  0539 12/25/23  0117   SODIUM mmol/L 138 137   POTASSIUM mmol/L 4.1 3.6   CHLORIDE mmol/L 107 104   CO2 mmol/L 24 26   BUN mg/dL 16 18   CREATININE mg/dL 0.65 0.76   ANION GAP mmol/L 7 7   CALCIUM mg/dL 9.4 9.5   ALBUMIN g/dL  --  4.5   TOTAL " BILIRUBIN mg/dL  --  1.11*   ALK PHOS U/L  --  65   ALT U/L  --  14   AST U/L  --  16   GLUCOSE RANDOM mg/dL 119 139     Results from last 7 days   Lab Units 12/25/23  0117   INR  0.95                   Lines/Drains:  Invasive Devices       Peripheral Intravenous Line  Duration             Peripheral IV 12/25/23 Left Antecubital <1 day                        Imaging: Reviewed radiology reports from this admission including: chest CT scan  CTA ED chest PE Study   Final Result by Atif Pabon MD (12/25 0235)      No evidence of pulmonary embolus.      Findings of interstitial pulmonary edema. Postsurgical repair of D-transposition of the great vessels.            Workstation performed: [id number]         XR chest portable    (Results Pending)       EKG and Other Studies Reviewed on Admission:   EKG: NSR. HR 75.    ** Please Note: This note has been constructed using a voice recognition system. **

## 2023-12-25 NOTE — ASSESSMENT & PLAN NOTE
S/P pacemaker   Continue home metoprolol and sotalol  Follows with cardiology at [facility]

## 2023-12-25 NOTE — ASSESSMENT & PLAN NOTE
S/P pacemaker   Continue home metoprolol and sotalol  Follows with cardiology at [facility]

## 2023-12-25 NOTE — ASSESSMENT & PLAN NOTE
hCG minimally elevated at 6  History of tubal ligation  ED had reached out to Gyn who recommended repeating the lab later today.  Suspect false positive test

## 2023-12-25 NOTE — ASSESSMENT & PLAN NOTE
hCG minimally elevated at 6  History of tubal ligation  repeat stating borderline  Repeat in 1 week

## 2023-12-25 NOTE — ED PROVIDER NOTES
History  Chief Complaint   Patient presents with    Cough     Reports cough x45 minutes, thinks she is coughing up blood. Took SpO2 at home reports 87%, on ASA, no chest pain, feels like she cannot catch her breath. Non-productive cough.      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  Not on any blood thinners.  No sick contacts    Otherwise no recent medication changes, no lower extremity swelling, no history of VTE no VTE risk factors        Prior to Admission Medications   Prescriptions Last Dose Informant Patient Reported? Taking?   Cholecalciferol 25 MCG (1000 UT) tablet  Self Yes No   Sig: Take 1,000 Units by mouth daily   Cyanocobalamin (VITAMIN B 12 PO)  Self Yes No   Sig: Take by mouth   ELDERBERRY PO  Self Yes No   Sig: Take by mouth   MULTIPLE VITAMINS-MINERALS PO  Self Yes No   Sig: Take 1 tablet by mouth daily   ascorbic acid (VITAMIN C) 500 mg tablet  Self Yes No   Sig: Take by mouth   aspirin (ECOTRIN LOW STRENGTH) 81 mg EC tablet  Self Yes No   Sig: Take 81 mg by mouth daily   betamethasone, augmented, (DIPROLENE) 0.05 % ointment  Self No No   Sig: Apply topically 2 (two) times a day   cetirizine (ZyrTEC) 10 mg tablet  Self Yes No   Sig: Take 10 mg by mouth daily   meclizine (ANTIVERT) 25 mg tablet  Self No No   Sig: Take 1 tablet (25 mg total) by mouth every 8 (eight) hours as needed for dizziness   meloxicam (Mobic) 15 mg tablet  Self No No   Sig: Take 1 tablet (15 mg total) by mouth daily   metoprolol succinate (TOPROL-XL) 25 mg 24 hr tablet  Self Yes No   Sig: Take 25 mg by mouth daily   mometasone (ELOCON) 0.1 % cream  Self No No   Sig: Apply topically daily   sotalol (BETAPACE) 120 mg tablet  Self Yes No   Sig: Take 1 tablet by mouth 2 (two) times a day       Facility-Administered Medications: None       Past Medical History:   Diagnosis Date    Atrial fibrillation (HCC)     last assessed 03/14/2014    BRCA1 negative     BRCA2 negative     Complete transposition of great vessels     last assessed 02/14/2014    Eczema     GERD (gastroesophageal reflux disease)     History of cardiac cath     Cath RVOT subpulmonary obstruction, last assessed 06/19/2012    Hyperbilirubinemia (congenital)     Migraine     SSS (sick sinus syndrome) (HCC)     Torsion of small intestine (HCC)        Past Surgical History:   Procedure Laterality Date    ADENOIDECTOMY      onset childhood    ATRIAL SEPTECTOMY      balloon transvenous method    CARDIAC PACEMAKER PLACEMENT      last assessed 03/14/2014    LAPAROSCOPIC LYSIS INTESTINAL ADHESIONS      OOPHORECTOMY      OTHER SURGICAL HISTORY      transposition repair mustard procedure, description 11 months    TONSILLECTOMY      TUBAL LIGATION      last assessed 08/17/2012    US GUIDED BREAST BIOPSY RIGHT COMPLETE Right 11/18/2019       Family History   Problem Relation Age of Onset    Cancer Father 63        hodgkins lymphoma     Melanoma Father     Colon cancer Sister 22        carcinoid tumor in the rectum    Heart disease Maternal Grandmother     Hyperlipidemia Maternal Grandmother     Diabetes Paternal Grandmother     Hypertension Paternal Grandmother     Lung cancer Paternal Grandmother     Heart disease Paternal Grandfather     Hyperlipidemia Paternal Grandfather     Lung cancer Paternal Grandfather     No Known Problems Mother     No Known Problems Daughter     Cancer Maternal Grandfather         tongue    Breast cancer Paternal Aunt 52    Ovarian cancer Paternal Aunt 30    No Known Problems Paternal Aunt     No Known Problems Maternal Aunt      I have reviewed and agree with the history as documented.    E-Cigarette/Vaping    E-Cigarette Use Never User      E-Cigarette/Vaping Substances    Nicotine No     THC No     CBD No     Flavoring No      Other No     Unknown No      Social History     Tobacco Use    Smoking status: Never    Smokeless tobacco: Never   Vaping Use    Vaping status: Never Used   Substance Use Topics    Alcohol use: Yes     Comment: social    Drug use: No       Review of Systems   Constitutional:  Negative for appetite change and fever.   HENT:  Negative for rhinorrhea and sore throat.    Eyes:  Negative for photophobia and visual disturbance.   Respiratory:  Positive for cough. Negative for chest tightness and wheezing.         Hemoptysis   Cardiovascular:  Negative for chest pain, palpitations and leg swelling.   Gastrointestinal:  Negative for abdominal distention, abdominal pain, blood in stool, constipation and diarrhea.   Genitourinary:  Negative for dysuria, flank pain, frequency, hematuria and urgency.   Musculoskeletal:  Negative for back pain.   Skin:  Negative for rash.   Neurological:  Negative for dizziness, weakness and headaches.   All other systems reviewed and are negative.      Physical Exam  Physical Exam  Vitals and nursing note reviewed.   Constitutional:       Appearance: She is well-developed.   HENT:      Head: Normocephalic and atraumatic.   Eyes:      Pupils: Pupils are equal, round, and reactive to light.   Cardiovascular:      Rate and Rhythm: Normal rate and regular rhythm.      Heart sounds: No murmur heard.     No friction rub. No gallop.   Pulmonary:      Effort: Pulmonary effort is normal.      Breath sounds: Wheezing present. No rales.      Comments: Left-sided wheezing and crackles  Chest:      Chest wall: No tenderness.   Abdominal:      General: There is no distension.      Palpations: Abdomen is soft. There is no mass.      Tenderness: There is no guarding or rebound.   Musculoskeletal:      Cervical back: Normal range of motion and neck supple.   Skin:     General: Skin is warm and dry.   Neurological:      Mental Status: She is alert and oriented to person, place, and time.         Vital  Signs  ED Triage Vitals [12/25/23 0052]   Temperature Pulse Respirations Blood Pressure SpO2   (!) 97 °F (36.1 °C) 86 18 122/82 90 %      Temp Source Heart Rate Source Patient Position - Orthostatic VS BP Location FiO2 (%)   Temporal Monitor Sitting Left arm --      Pain Score       --           Vitals:    12/25/23 0052 12/25/23 0400   BP: 122/82 109/58   Pulse: 86 74   Patient Position - Orthostatic VS: Sitting          Visual Acuity      ED Medications  Medications   iohexol (OMNIPAQUE) 350 MG/ML injection (MULTI-DOSE) 70 mL (70 mL Intravenous Given 12/25/23 0215)       Diagnostic Studies  Results Reviewed       Procedure Component Value Units Date/Time    HS Troponin I 2hr [[id number]] Collected: 12/25/23 0416    Lab Status: In process Specimen: Blood from Arm, Left Updated: 12/25/23 0420    HS Troponin I 4hr [[id number]]     Lab Status: No result Specimen: Blood     D-dimer, quantitative [[id number]]  (Normal) Collected: 12/25/23 0117    Lab Status: Final result Specimen: Blood from Arm, Left Updated: 12/25/23 0203     D-Dimer, Quant <0.27 ug/ml FEU     FLU/RSV/COVID - if FLU/RSV clinically relevant [[id number]]  (Normal) Collected: 12/25/23 0117    Lab Status: Final result Specimen: Nares from Nose Updated: 12/25/23 0201     SARS-CoV-2 Negative     INFLUENZA A PCR Negative     INFLUENZA B PCR Negative     RSV PCR Negative    Narrative:      FOR PEDIATRIC PATIENTS - copy/paste COVID Guidelines URL to browser: [email or web address]    SARS-CoV-2 assay is a Nucleic Acid Amplification assay intended for the  qualitative detection of nucleic acid from SARS-CoV-2 in nasopharyngeal  swabs. Results are for the presumptive identification of SARS-CoV-2 RNA.    Positive results are indicative of infection with SARS-CoV-2, the virus  causing COVID-19, but do not rule out bacterial infection or co-infection  with other viruses. Laboratories within the [place] and  its  territories are required to report all positive results to the appropriate  public health authorities. Negative results do not preclude SARS-CoV-2  infection and should not be used as the sole basis for treatment or other  patient management decisions. Negative results must be combined with  clinical observations, patient history, and epidemiological information.  This test has not been FDA cleared or approved.    This test has been authorized by FDA under an Emergency Use Authorization  (EUA). This test is only authorized for the duration of time the  declaration that circumstances exist justifying the authorization of the  emergency use of an in vitro diagnostic tests for detection of SARS-CoV-2  virus and/or diagnosis of COVID-19 infection under section 564(b)(1) of  the Act, 21 U.S.C. 360bbb-3(b)(1), unless the authorization is terminated  or revoked sooner. The test has been validated but independent review by FDA  and CLIA is pending.    Test performed using [employer] GeneXpert: This RT-PCR assay targets N2,  a region unique to SARS-CoV-2. A conserved region in the E-gene was chosen  for pan-Sarbecovirus detection which includes SARS-CoV-2.    According to CMS-2020-01-R, this platform meets the definition of high-throughput technology.    HS Troponin 0hr (reflex protocol) [[id number]]  (Normal) Collected: 12/25/23 0117    Lab Status: Final result Specimen: Blood from Arm, Left Updated: 12/25/23 0150     hs TnI 0hr 10 ng/L     hCG, quantitative [[id number]]  (Abnormal) Collected: 12/25/23 0117    Lab Status: Final result Specimen: Blood from Arm, Left Updated: 12/25/23 0150     HCG, Quant 6 mIU/mL     Narrative:       Expected Ranges:    HCG results between 5 and 25 mIU/mL may be indicative of early pregnancy but should be interpreted in light of the total clinical presentation.    HCG can rise to detectable levels in williams and post menopausal women (0-11.6 mIU/mL).     Approximate               Approximate  HCG  Gestation age          Concentration ( mIU/mL)  _____________          ______________________   Weeks                      HCG values  0.2-1                       5-50  1-2                           2-3                         100-5000  3-4                         500-25918  4-5                         1000-59919  5-6                         [id number]  6-8                         [id number]  8-12                        [id number]      Protime-INR [[id number]]  (Normal) Collected: 12/25/23 0117    Lab Status: Final result Specimen: Blood from Arm, Left Updated: 12/25/23 0149     Protime 13.1 seconds      INR 0.95    APTT [[id number]]  (Normal) Collected: 12/25/23 0117    Lab Status: Final result Specimen: Blood from Arm, Left Updated: 12/25/23 0149     PTT 29 seconds     Comprehensive metabolic panel [[id number]]  (Abnormal) Collected: 12/25/23 0117    Lab Status: Final result Specimen: Blood from Arm, Left Updated: 12/25/23 0142     Sodium 137 mmol/L      Potassium 3.6 mmol/L      Chloride 104 mmol/L      CO2 26 mmol/L      ANION GAP 7 mmol/L      BUN 18 mg/dL      Creatinine 0.76 mg/dL      Glucose 139 mg/dL      Calcium 9.5 mg/dL      AST 16 U/L      ALT 14 U/L      Alkaline Phosphatase 65 U/L      Total Protein 7.3 g/dL      Albumin 4.5 g/dL      Total Bilirubin 1.11 mg/dL      eGFR 97 ml/min/1.73sq m     Narrative:      National Kidney Disease Foundation guidelines for Chronic Kidney Disease (CKD):     Stage 1 with normal or high GFR (GFR > 90 mL/min/1.73 square meters)    Stage 2 Mild CKD (GFR = 60-89 mL/min/1.73 square meters)    Stage 3A Moderate CKD (GFR = 45-59 mL/min/1.73 square meters)    Stage 3B Moderate CKD (GFR = 30-44 mL/min/1.73 square meters)    Stage 4 Severe CKD (GFR = 15-29 mL/min/1.73 square meters)    Stage 5 End Stage CKD (GFR <15 mL/min/1.73 square meters)  Note: GFR calculation is accurate only with a steady state creatinine    CBC and differential [[id number]] Collected:  12/25/23 0117    Lab Status: Final result Specimen: Blood from Arm, Left Updated: 12/25/23 0124     WBC 7.45 Thousand/uL      RBC 4.69 Million/uL      Hemoglobin 14.1 g/dL      Hematocrit 41.9 %      MCV 89 fL      MCH 30.1 pg      MCHC 33.7 g/dL      RDW 12.2 %      MPV 10.9 fL      Platelets 219 Thousands/uL      nRBC 0 /100 WBCs      Neutrophils Relative 51 %      Immat GRANS % 0 %      Lymphocytes Relative 34 %      Monocytes Relative 9 %      Eosinophils Relative 6 %      Basophils Relative 0 %      Neutrophils Absolute 3.75 Thousands/µL      Immature Grans Absolute 0.02 Thousand/uL      Lymphocytes Absolute 2.56 Thousands/µL      Monocytes Absolute 0.69 Thousand/µL      Eosinophils Absolute 0.41 Thousand/µL      Basophils Absolute 0.02 Thousands/µL     POCT pregnancy, urine [[id number]]  (Normal) Resulted: 12/25/23 0104    Lab Status: Final result Updated: 12/25/23 0104     EXT Preg Test, Ur Negative     Control Valid                   CTA ED chest PE Study   Final Result by Atif Pabon MD (12/25 0235)      No evidence of pulmonary embolus.      Findings of interstitial pulmonary edema. Postsurgical repair of D-transposition of the great vessels.            Workstation performed: [id number]         XR chest portable    (Results Pending)              Procedures  Procedures         ED Course  ED Course as of 12/25/23 0430   Mon Dec 25, 2023   0043 Medical record reviewed most recently seen by OB/GYN 12/19/2023, exam unremarkable, patient status post tubal ligation   0104 Patient moderate risk for VTE will add D-dimer   0104 Procedure Note: EKG  Date/Time: 12/25/23 1:04 AM   Performed by: MARTA RHODES  Authorized by: MARTA RHODES  Indications / Diagnosis: CP  ECG reviewed by me, the ED Provider: yes   The EKG demonstrates:  Rhythm: a -paced  Intervals: normal intervals  Axis: normal axis  QRS/Blocks: normal QRS  ST Changes:No acute ST Changes, no STD/BUBBA.  Similar to previous       0133 SpO2: 90 %  On 2 L O2  supplemental due to desaturations to high 80s   0327 HCG QUANTITATIVE(!): 6  Tubal ligation, no current abdominal pain, will discuss with OB   0340 Patient states that when she coughs there is a burning feeling in the back of her throat, no history of GERD, does again state that the blood was in her sputum not vomiting   0350 No acute respiratory distress, saturating 98% on 2 L will trial off of O2   0353 discussed with OB/GYN, low suspicion for true ectopic, recommends repeating hCG in 24 hours   0430 Patient currently low 90s on room air, no clear reason for her relative hypoxia and hemoptysis, will admit for further evaluation                               SBIRT 20yo+      Flowsheet Row Most Recent Value   Initial Alcohol Screen: US AUDIT-C     1. How often do you have a drink containing alcohol? 0 Filed at: 12/25/2023 0054   2. How many drinks containing alcohol do you have on a typical day you are drinking?  0 Filed at: 12/25/2023 0054   3a. Male UNDER 65: How often do you have five or more drinks on one occasion? 0 Filed at: 12/25/2023 0054   3b. FEMALE Any Age, or MALE 65+: How often do you have 4 or more drinks on one occassion? 0 Filed at: 12/25/2023 0054   Audit-C Score 0 Filed at: 12/25/2023 0054   XU: How many times in the past year have you...    Used an illegal drug or used a prescription medication for non-medical reasons? Never Filed at: 12/25/2023 0054            Wells' Criteria for PE      Flowsheet Row Most Recent Value   Wells' Criteria for PE    Clinical signs and symptoms of DVT --   PE is primary diagnosis or equally likely 3 Filed at: 12/25/2023 0104   HR >100 0 Filed at: 12/25/2023 0104   Immobilization at least 3 days or Surgery in the previous 4 weeks 0 Filed at: 12/25/2023 0104   Previous, objectively diagnosed PE or DVT 0 Filed at: 12/25/2023 0104   Hemoptysis 1 Filed at: 12/25/2023 0104   Malignancy with treatment within 6 months or palliative 0 Filed at: 12/25/2023 0104   Wells'  Criteria Total 4 Filed at: 12/25/2023 0104                  Medical Decision Making  43-year-old female with acute onset hemoptysis, will obtain EKG to evaluate for arrhythmia, x-ray to evaluate for pneumothorax, pneumonia  Other considerations VTE, alveolar hemorrhage, mass lesion, bronchitis    Amount and/or Complexity of Data Reviewed  Labs: ordered. Decision-making details documented in ED Course.  Radiology: ordered.    Risk  Prescription drug management.  Decision regarding hospitalization.             Disposition  Final diagnoses:   Acute respiratory failure with hypoxia (HCC)   Hemoptysis     Time reflects when diagnosis was documented in both MDM as applicable and the Disposition within this note       Time User Action Codes Description Comment    12/25/2023  3:36 AM Maliha Haider [J96.01] Acute respiratory failure with hypoxia (HCC)     12/25/2023  3:36 AM Maliha Haider Add [K92.0] Hematemesis     12/25/2023  4:28 AM Maliha Haider Remove [K92.0] Hematemesis     12/25/2023  4:29 AM Maliha Haider Add [R04.2] Hemoptysis           ED Disposition       ED Disposition   Admit    Condition   Stable    Date/Time   Mon Dec 25, 2023 0429    Comment   Case was discussed with KOFFI and the patient's admission status was agreed to be Admission Status: observation status to the service of Dr. Almonte .               Follow-up Information    None         Patient's Medications   Discharge Prescriptions    No medications on file       No discharge procedures on file.    PDMP Review       None            ED Provider  Electronically Signed by             Maliha Haider DO  12/25/23 0430       Maliha Haider DO  12/25/23 0430

## 2023-12-25 NOTE — PLAN OF CARE
Problem: PAIN - ADULT  Goal: Verbalizes/displays adequate comfort level or baseline comfort level  Description: Interventions:  - Encourage patient to monitor pain and request assistance  - Assess pain using appropriate pain scale  - Administer analgesics based on type and severity of pain and evaluate response  - Implement non-pharmacological measures as appropriate and evaluate response  - Consider cultural and social influences on pain and pain management  - Notify physician/advanced practitioner if interventions unsuccessful or patient reports new pain  Outcome: Progressing     Problem: INFECTION - ADULT  Goal: Absence or prevention of progression during hospitalization  Description: INTERVENTIONS:  - Assess and monitor for signs and symptoms of infection  - Monitor lab/diagnostic results  - Monitor all insertion sites, i.e. indwelling lines, tubes, and drains  - Monitor endotracheal if appropriate and nasal secretions for changes in amount and color  - Riva appropriate cooling/warming therapies per order  - Administer medications as ordered  - Instruct and encourage patient and family to use good hand hygiene technique  - Identify and instruct in appropriate isolation precautions for identified infection/condition  Outcome: Progressing  Goal: Absence of fever/infection during neutropenic period  Description: INTERVENTIONS:  - Monitor WBC    Outcome: Progressing     Problem: SAFETY ADULT  Goal: Patient will remain free of falls  Description: INTERVENTIONS:  - Educate patient/family on patient safety including physical limitations  - Instruct patient to call for assistance with activity   - Consult OT/PT to assist with strengthening/mobility   - Keep Call bell within reach  - Keep bed low and locked with side rails adjusted as appropriate  - Keep care items and personal belongings within reach  - Initiate and maintain comfort rounds  - Make Fall Risk Sign visible to staff  - Outcome: Progressing  Goal:  Maintain or return to baseline ADL function  Description: INTERVENTIONS:  -  Assess patient's ability to carry out ADLs; assess patient's baseline for ADL function and identify physical deficits which impact ability to perform ADLs (bathing, care of mouth/teeth, toileting, grooming, dressing, etc.)  - Assess/evaluate cause of self-care deficits   - Assess range of motion  - Assess patient's mobility; develop plan if impaired  - Assess patient's need for assistive devices and provide as appropriate  - Encourage maximum independence but intervene and supervise when necessary  - Involve family in performance of ADLs  - Assess for home care needs following discharge   - Consider OT consult to assist with ADL evaluation and planning for discharge  - Provide patient education as appropriate  Outcome: Progressing  Goal: Maintains/Returns to pre admission functional level  Description: INTERVENTIONS:  - Perform AM-PAC 6 Click Basic Mobility/ Daily Activity assessment daily.  - Set and communicate daily mobility goal to care team and patient/family/caregiver.   - Collaborate with rehabilitation services on mobility goals if consulted  - Perform Range of Motion 4 times a day.  - Reposition patient every 4 hours.  - Dangle patient 4 times a day  - Stand patient 4 times a day  - Ambulate patient 4 times a day  - Out of bed to chair 4 times a day   - Out of bed for meals 4 times a day  - Out of bed for toileting  - Record patient progress and toleration of activity level   Outcome: Progressing     Problem: DISCHARGE PLANNING  Goal: Discharge to home or other facility with appropriate resources  Description: INTERVENTIONS:  - Identify barriers to discharge w/patient and caregiver  - Arrange for needed discharge resources and transportation as appropriate  - Identify discharge learning needs (meds, wound care, etc.)  - Arrange for interpretive services to assist at discharge as needed  - Refer to Case Management Department for  coordinating discharge planning if the patient needs post-hospital services based on physician/advanced practitioner order or complex needs related to functional status, cognitive ability, or social support system  Outcome: Progressing     Problem: Nutrition/Hydration-ADULT  Goal: Nutrient/Hydration intake appropriate for improving, restoring or maintaining nutritional needs  Description: Monitor and assess patient's nutrition/hydration status for malnutrition. Collaborate with interdisciplinary team and initiate plan and interventions as ordered.  Monitor patient's weight and dietary intake as ordered or per policy. Utilize nutrition screening tool and intervene as necessary. Determine patient's food preferences and provide high-protein, high-caloric foods as appropriate.     INTERVENTIONS:  - Monitor oral intake, urinary output, labs, and treatment plans  - Assess nutrition and hydration status and recommend course of action  - Evaluate amount of meals eaten  - Assist patient with eating if necessary   - Allow adequate time for meals  - Recommend/ encourage appropriate diets, oral nutritional supplements, and vitamin/mineral supplements  - Order, calculate, and assess calorie counts as needed  - Recommend, monitor, and adjust tube feedings and TPN/PPN based on assessed needs  - Assess need for intravenous fluids  - Provide specific nutrition/hydration education as appropriate  - Include patient/family/caregiver in decisions related to nutrition  Outcome: Progressing     Problem: RESPIRATORY - ADULT  Goal: Achieves optimal ventilation and oxygenation  Description: INTERVENTIONS:  - Assess for changes in respiratory status  - Assess for changes in mentation and behavior  - Position to facilitate oxygenation and minimize respiratory effort  - Oxygen administered by appropriate delivery if ordered  - Initiate smoking cessation education as indicated  - Encourage broncho-pulmonary hygiene including cough, deep breathe,  Incentive Spirometry  - Assess the need for suctioning and aspirate as needed  - Assess and instruct to report SOB or any respiratory difficulty  - Respiratory Therapy support as indicated  Outcome: Progressing

## 2023-12-25 NOTE — DISCHARGE INSTR - AVS FIRST PAGE
You are to follow-up with your primary cardiology team sometime this week for updated echo and further evaluation. You are to continue to take aspirin unless told to stop by your cardiologist.    I also recommend that you follow-up with your PCP in 1 to 2 weeks.    You will be discharged on Lasix 20 mg to be taken once a day for 7 days. You are to only intake 2L of fluids per day

## 2023-12-26 ENCOUNTER — TRANSITIONAL CARE MANAGEMENT (OUTPATIENT)
Dept: FAMILY MEDICINE CLINIC | Facility: CLINIC | Age: 43
End: 2023-12-26

## 2023-12-27 ENCOUNTER — TELEPHONE (OUTPATIENT)
Age: 43
End: 2023-12-27

## 2023-12-27 NOTE — TELEPHONE ENCOUNTER
Caller: Patient    Doctor: Marla    Reason for call: Patient was in the hospital. Her cardiologist recommends that she postpone her injection until later in January. Please contact patient to reschedule usgi    Call back#: [phone]

## 2023-12-28 ENCOUNTER — OFFICE VISIT (OUTPATIENT)
Dept: FAMILY MEDICINE CLINIC | Facility: CLINIC | Age: 43
End: 2023-12-28
Payer: COMMERCIAL

## 2023-12-28 VITALS
OXYGEN SATURATION: 97 % | DIASTOLIC BLOOD PRESSURE: 69 MMHG | BODY MASS INDEX: 25.95 KG/M2 | HEIGHT: 62 IN | SYSTOLIC BLOOD PRESSURE: 103 MMHG | WEIGHT: 141 LBS | HEART RATE: 83 BPM | TEMPERATURE: 97.9 F

## 2023-12-28 DIAGNOSIS — I49.5 SINOATRIAL NODE DYSFUNCTION (HCC): ICD-10-CM

## 2023-12-28 DIAGNOSIS — R04.2 HEMOPTYSIS: Primary | ICD-10-CM

## 2023-12-28 DIAGNOSIS — R79.89 ELEVATED SERUM HCG: ICD-10-CM

## 2023-12-28 PROCEDURE — 99496 TRANSJ CARE MGMT HIGH F2F 7D: CPT | Performed by: NURSE PRACTITIONER

## 2023-12-28 NOTE — PROGRESS NOTES
Assessment & Plan     1. Hemoptysis  Assessment & Plan:  No recurrence, suspect it could have been food related as she ate about 1 hour prior cherries, sprinkles that were red. No clots were passed  Continue follow up with cardiology  Has had no shortness of breath, decreased pulse ox or abnormalities  Did not take lasix upon discharge      2. Elevated serum hCG  Assessment & Plan:  Hcg was minimally elevated in hospital  History of tubal  Due for repeat HCG in in 3-4 days      3. Sinoatrial node dysfunction (HCC)  Assessment & Plan:  Has pacemaker follows with [facility]  Continue sotalol and metoprolol        Depression Screening and Follow-up Plan: Patient was screened for depression during today's encounter. They screened negative with a PHQ-2 score of 0.      Subjective     Transitional Care Management Review:   Morenita Jensen is a 43 y.o. female here for TCM follow up.     During the TCM phone call patient stated:  TCM Call     Date and time call was made  12/26/2023  9:07 AM    Patient was hospitialized at  [facility]    Date of Admission  12/25/23    Date of discharge  12/25/23    Diagnosis  Hemoptysis    Disposition  Home    Current Symptoms  None      TCM Call     Post hospital issues  None    Do you need help managing your prescriptions or medications  No    Is transportation to your appointment needed  No    I have advised the patient to call PCP with any new or worsening symptoms  Misty Bowman,     Living Arrangements  Spouse or Significiant other    Support System  Partner        Went to ER on 12/25/23 in middle of night due to episode of hemopytsis. Woke up coughing and noticed sputum was pinkish/red. Denied clots. O2 in ER in 80s and she was short of breath. Placed on oxygen- weaned to room air without intervention. ASA was help on admission she typically takes 81mg daily  CTA chest   - No evidence of pulmonary embolus.  - Findings of interstitial pulmonary edema.  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  ? COVID/flu/RSV negative  ? Hemoglobin WNL  ? Sputum culture ordered- not collected  ? Has had no further episodes  ? Repeat Hb stable  ? Given 1x dose of IV lasix 40  ? Discharge with lasix 20 qd for 7 days  ? She is to f/u with her primary cardiology team to obtain new echo and evaluation of Pulm edema  ? Recommending discharge with a 2L fluid restriction    Urine pregnancy negative, hcg quantitative 6,  hcg serum qualitative borderline- repeat in 1 week    LMP 12/7/23- started lighter pink usually is dark blood- very light which isnt abnormal for her. Had US pelvis on 12/24/23. Did not have a menses in November. October had normal menses.             Review of Systems   Constitutional: Negative.  Negative for fatigue and fever.   HENT: Negative.     Eyes: Negative.    Respiratory:  Positive for choking (very rarely will choke on water when drinking). Negative for cough and shortness of breath.    Cardiovascular: Negative.  Negative for chest pain, palpitations and leg swelling.   Gastrointestinal: Negative.    Endocrine: Negative.    Genitourinary: Negative.    Musculoskeletal: Negative.    Skin:  Negative for rash.        hands   Allergic/Immunologic: Negative.    Neurological: Negative.    Psychiatric/Behavioral: Negative.         Objective     /69   Pulse 83   Temp 97.9 °F (36.6 °C) (Tympanic)   Ht 5' 2\" (1.575 m)   Wt 64 kg (141 lb)   LMP 12/07/2023   SpO2 97%   BMI 25.79 kg/m²      Physical Exam  Vitals and nursing note reviewed.   Constitutional:       General: She is not in acute distress.     Appearance: She is not ill-appearing.   HENT:      Head: Normocephalic and atraumatic.   Cardiovascular:      Rate and Rhythm: Normal rate and regular rhythm.      Pulses: Normal pulses.      Heart sounds: Murmur heard.   Pulmonary:      Effort: Pulmonary effort is normal.      Breath sounds: Normal breath sounds.   Abdominal:      " General: Abdomen is flat. Bowel sounds are normal.      Palpations: Abdomen is soft.   Musculoskeletal:      Right lower leg: No edema.      Left lower leg: No edema.   Skin:     General: Skin is warm.   Neurological:      General: No focal deficit present.      Mental Status: She is alert and oriented to person, place, and time.       Medications have been reviewed by provider in current encounter    SAE Cullen

## 2023-12-29 LAB
ATRIAL RATE: 75 BPM
P AXIS: 30 DEGREES
PR INTERVAL: 294 MS
QRS AXIS: -88 DEGREES
QRSD INTERVAL: 22 MS
QT INTERVAL: 338 MS
QTC INTERVAL: 377 MS
T WAVE AXIS: 140 DEGREES
VENTRICULAR RATE: 75 BPM

## 2023-12-29 NOTE — ASSESSMENT & PLAN NOTE
No recurrence, suspect it could have been food related as she ate about 1 hour prior cherries, sprinkles that were red. No clots were passed  Continue follow up with cardiology  Has had no shortness of breath, decreased pulse ox or abnormalities  Did not take lasix upon discharge

## 2024-01-29 NOTE — PROGRESS NOTES
1. Chronic pain of left elbow    2. Lateral epicondylitis, left elbow        No orders of the defined types were placed in this encounter.    No orders of the defined types were placed in this encounter.       IMAGING STUDIES: (I personally reviewed images in PACS and report):     PAST REPORTS:    Cardiology consult 1/23/2024      XR left elbow 11/13/2023  No significant degenerative changes.   No acute osseous abnormality.     EMG UE 5/18/2020  Normal with comparison to left .       ASSESSMENT/PLAN:    Chronic left elbow pain  Lateral epicondylitis left elbow  Right Tennis Elbow resolved  PMHX: Right bundle branch block, V-Tach non-sustained, congenital heart disease, hx of bppv, arrhythmias, PACEMAKER. High co-pay for PT   She is agreeable for PRP injection and tolerated the procedure well today.     Repeat X-ray next visit: None    No follow-ups on file.    Patient instructions below verbally summarized in person during encounter:  There are no Patient Instructions on file for this visit.      Ultrasound-guided PRP injection to left elbow administered today.    Red flags and risks of injection include but are not limited to infection <0.072% as referenced in some sources, nerve or artery penetration, and if steroids are used-skin dimpling <1%, hypo-pigmentation <1%.  Keep the injection area clean and dry for the next 24 hours. Avoid submerging underwater in a tub, pool, ocean, lake, jacuzzi, hot tub, or any other body of water for 1 week until needle wound closes due to risk of infection. May take showers. Clean needle site with soap and water and keep covered at all times with sterile bandage such as a band-aid until fully healed.  If any symptoms including fevers, chills, swelling, or worsening symptoms occur then to call office or go to hospital for immediate care if physician unavailable due to possible infection or other complication which is a serious medical problem.  May resume regular activities as  tolerated and use local ice application or over-the-counter pain medications if there is any discomfort.     PRP stands for Platelet Rich Plasma and is a Injectate solution prepared from a patient's own blood that has a high concentration of platelets. Blood is drawn from the patient in the same way that occurs during a routine blood draw. A special machine then extracts platelet growth factors and other natural chemicals from the patient's own blood that are hypothesized to help healing tissue. PRP has been used to help healing tissues since approximately 1999, and specifically, for arthritis and cartilage problems since 2003.     Injection of PRP is recommended under ultrasound to attain visuzlization of needle and injectate into target site. Once injected, PRP begins to clot and within 10 minutes of clotting, the platelets begin to secrete their PDGFs (Platelet derived growth factors) which help to aid healing. Lab studies in petri dishes have shown that these healing factors aid in growth of tissue. There have been a number of studies to show evidence that PRP is better in humans when injected compared to placebo, but there is no definitive evidence. Specifically, PRP injection does not result in relief consistently 100% of the time and is considered experimental. As such, PRP should not be viewed as curative but as a treatment modality that may offer relief.     USG PRP TENNIS ELBOW:  Corticosteriod injections have been used since the 1950s for pain relief in lateral epicondylitis with great short-term relief; however, studies in the early 2000s began to demonstrate that corticosteroids may provide no difference in long-term benefit compared to other procedures such as needle fenestration without steroid. Subsequently, many physicians have begun to stray away from corticosteroid injections in search of a curative long-term treatment. PRP is a potential and yet inconsistent curative treatment for the long-term  resolution of lateral epicondylitis. It does provide short-term relief with some studies illustrating equal benefit for pain reduction to corticosteroids but anecdotally the pain relief is likely inferior at first. The goal of PRP, therefore, is not to provide short-term relief but to help jumpstart the healing process within the scarred tendon in an effort to provide persistent curative relief down the road. The full benefits of PRP may not be appreciated until 6-12 months as a curative treatment preventing the eventual return of lateral epicondylitis symptoms that is usually seen with corticosteroid injections. In summary, PRP treatment should be seen as an adjunct to other treatments such as activity modification, tennis elbow strap, night-time wrist bracing, and physical therapy.       __________________________________________________________________________    HISTORY OF PRESENT ILLNESS:    43 y.o. female presents for 3-months follow-up for PRP injection of LEFT lateral epicondylitis.  Last visit on 11/13/2023 patient was recommended home exercise, blood work ordered, and scheduled follow-up for PRP injection.    11/14/2023 lab result  CBC wnl  ESR negative  RF negative  CCP negative  MARICRUZ negative  Lyme IgG negative  TSH negative      1/30/2024:  Today patient reports persistent pain in her lateral elbow, rated her pain 5-6/10 worst 9/10. This has been going on for 3 months. Forearm supination and pronation aggravated the pain. She has tried tennis strap and PT for one session and she has been doing home exercises at home. However the pain persists.     She is right handed. She owns a house cleaning business.     Review of Systems    Following history reviewed and updated:  Historical Information   Past Medical History:   Diagnosis Date    Atrial fibrillation (HCC)     last assessed 03/14/2014    BRCA1 negative     BRCA2 negative     Complete transposition of great vessels     last assessed 02/14/2014     Eczema     GERD (gastroesophageal reflux disease)     History of cardiac cath     Cath RVOT subpulmonary obstruction, last assessed 06/19/2012    Hyperbilirubinemia (congenital)     Migraine     SSS (sick sinus syndrome) (HCC)     Torsion of small intestine (HCC)      Past Surgical History:   Procedure Laterality Date    ADENOIDECTOMY      onset childhood    ATRIAL SEPTECTOMY      balloon transvenous method    CARDIAC PACEMAKER PLACEMENT      last assessed 03/14/2014    LAPAROSCOPIC LYSIS INTESTINAL ADHESIONS      OOPHORECTOMY      OTHER SURGICAL HISTORY      transposition repair mustard procedure, description 11 months    TONSILLECTOMY      TUBAL LIGATION      last assessed 08/17/2012    US GUIDED BREAST BIOPSY RIGHT COMPLETE Right 11/18/2019     Social History     Socioeconomic History    Marital status: /Civil Union     Spouse name: Not on file    Number of children: Not on file    Years of education: Not on file    Highest education level: Not on file   Occupational History    Not on file   Tobacco Use    Smoking status: Never    Smokeless tobacco: Never   Vaping Use    Vaping status: Never Used   Substance and Sexual Activity    Alcohol use: Yes     Comment: social    Drug use: No    Sexual activity: Not on file   Other Topics Concern    Not on file   Social History Narrative    Not on file     Social Determinants of Health     Financial Resource Strain: Not on file   Food Insecurity: Not on file   Transportation Needs: Not on file   Physical Activity: Not on file   Stress: Not on file   Social Connections: Not on file   Intimate Partner Violence: Not on file   Housing Stability: Not on file     Family History   Problem Relation Age of Onset    Cancer Father 63        hodgkins lymphoma     Melanoma Father     Colon cancer Sister 22        carcinoid tumor in the rectum    Heart disease Maternal Grandmother     Hyperlipidemia Maternal Grandmother     Diabetes Paternal Grandmother     Hypertension Paternal  Grandmother     Lung cancer Paternal Grandmother     Heart disease Paternal Grandfather     Hyperlipidemia Paternal Grandfather     Lung cancer Paternal Grandfather     No Known Problems Mother     No Known Problems Daughter     Cancer Maternal Grandfather         tongue    Breast cancer Paternal Aunt 52    Ovarian cancer Paternal Aunt 30    No Known Problems Paternal Aunt     No Known Problems Maternal Aunt       Allergies   Allergen Reactions    Enalapril      Reaction Date: 14Apr2011;   Pt denies 2/2018  Patient stated it may interact with sotalol.    Other      Other reaction(s): Other  Tongue feels funny    Penicillins Hives     Category: Allergy;        There were no vitals taken for this visit.    Physical Exam  Constitutional:  Vitals and nursing note reviewed.   General: Normal appearance. Not ill-appearing, toxic-appearing or diaphoretic. not in acute distress.  HENT: Head is normocephalic and atraumatic. Bilateral external ear and nose normal  Eyes: No discharge. Extraocular movements intact.   Cardiovascular: Normal rate  Pulmonary:  Pulmonary effort is normal. No respiratory distress.   Musculoskeletal: No gross injury or deformity except for other than mention below.  Skin: Skin is warm.   Neurological: Awake, alert, and mental status is at baseline. No gross focal deficit present.  Psychiatric:  Mood normal. Behavior normal.     Ortho Exam  LEFT ELBOW:  No wounds, swelling, erythema, ecchymosis, or increased warmth  ROM full  Tenderness: lateral epicondyle  No laxity of joint  Elbow strength flexion:  5/5  Elbow strength extension:  5/5  Elbow strength pronation: 5/5   Elbow strength supination: 5/5, with pain   Tinel's test ulnar nerve:  Negative   Resisted wrist flexion and finger extension caused pain    _  Medium joint arthrocentesis  Universal Protocol:  Procedure performed by: (supervised by Dr. Gutiérrez)  Consent: Verbal consent obtained.  Risks and benefits: risks, benefits and alternatives  were discussed  Consent given by: patient  Patient understanding: patient states understanding of the procedure being performed  Patient consent: the patient's understanding of the procedure matches consent given  Site marked: the operative site was marked  Radiology Images displayed and confirmed. If images not available, report reviewed: imaging studies available  Patient identity confirmed: verbally with patient  Supporting Documentation  Indications: pain   Procedure Details  Location: elbow (lateral epicondyle) -   Needle size: 22 G  Ultrasound guidance: yes  Approach: anterolateral      The patient was explained that PRP injections stands for platelet rich plasma in which he will undergo venipuncture.  The obtained blood sample is then centrifuge to isolate a concentrate of platelet rich plasma which is thereafter injected into the pathological tissue.  The patient was explained that this method of treatment is still considered experimental and is not reversible via his insurance.  It was confirmed that the patient currently does not have any blood dyscrasias or active symptoms of infection either locally or systemically.  The patient was explained that the treatment has likelihood of higher efficacy if combined with physical therapy rehabilitation.  Post injection, patient is advised not to use any nonsteroidal anti-inflammatory medication for pain control at least over the next 2-3 weeks.  However, the patient may consider using local ice application or oral acetaminophen for pain as needed.  Upon explanation of risks and benefits of this procedure a verbal consent was obtained.  Under sterile conditions, 30 mL of venous blood was obtained via venipuncture of the right upper extremity.  Upon centrifugation of the sample 5 mL of platelet rich plasma was obtained for each knee.  Lateral epicondyle of the left elbow was identified via office based clinical ultrasound.  Injection site was cleaned using iodine,  chlorhexidine and alcohol.  Sterile ultrasound gel were used along with a linear transducer.  A 1.5  inch 22-gauge spinal needle was then introduced through a lateral approach under direct needle visualization into the area of pathology.  3 mL of PRP solution was then injected into this area of pathology under direct ultrasound visualization in the epicondyle and common extensor tendon.  Thereafter, a sterile dressing was placed.

## 2024-01-30 ENCOUNTER — OFFICE VISIT (OUTPATIENT)
Dept: OBGYN CLINIC | Facility: OTHER | Age: 44
End: 2024-01-30

## 2024-01-30 VITALS
SYSTOLIC BLOOD PRESSURE: 92 MMHG | DIASTOLIC BLOOD PRESSURE: 64 MMHG | WEIGHT: 141 LBS | HEIGHT: 62 IN | BODY MASS INDEX: 25.95 KG/M2

## 2024-01-30 DIAGNOSIS — M77.12 LATERAL EPICONDYLITIS, LEFT ELBOW: Primary | ICD-10-CM

## 2024-01-30 DIAGNOSIS — G89.29 CHRONIC PAIN OF LEFT ELBOW: ICD-10-CM

## 2024-01-30 DIAGNOSIS — M25.522 CHRONIC PAIN OF LEFT ELBOW: ICD-10-CM

## 2024-01-30 PROCEDURE — 20606 DRAIN/INJ JOINT/BURSA W/US: CPT | Performed by: FAMILY MEDICINE

## 2024-01-30 NOTE — PATIENT INSTRUCTIONS
Trial left elbow USG tenotomy and PRP injection    Tennis elbow (lateral epicondylitis) or its cousin, Golfer’s elbow (medial epicondyltitis), are irritations and inflammations of the anchor points of your forearm muscles at your elbow. Tennis elbow is usually caused by overuse, extending your wrist (pushing wrist up) and golfer’s elbow by flexion of the wrist (pushing wrist down). This takes at least 6 weeks to heal and usually up to 3 months to see complete resolution even after PRP injecton.    You will begin range of motion exercises and stretching the first week. Please make an appointment to begin physical therapy formally next week.     Please avoid nsaids for pain relief as this will inhibit and block PRP healing. Use tylenol (acetaminophen) for pain relief.     I recommend against taking anti-inflammatory medications also known NSAIDs  (non-steroidal anti-inflammatory pills including advil, ibuprofen, motrin, meloxicam, celecoxib, aleve, naproxen, and aspirin). If you have no liver problems, you may take Tylenol (also known as acetaminophen) at a  maximum of 1,000  Mg per dose every 6 hours as needed for pain but no more 3 doses or 3,000 mg per day.    PRP stands for Platelet Rich Plasma and is a Injectate solution prepared from a patient's own blood that has a high concentration of platelets. Blood is drawn from the patient in the same way that occurs during a routine blood draw. A special machine then extracts platelet growth factors and other natural chemicals from the patient's own blood that are hypothesized to help healing tissue. PRP has been used to help healing tissues since approximately 1999, and specifically, for arthritis and cartilage problems since 2003.     Injection of PRP is recommended under ultrasound to attain visuzlization of needle and injectate into target site. Once injected, PRP begins to clot and within 10 minutes of clotting, the platelets begin to secrete their PDGFs (Platelet  derived growth factors) which help to aid healing. Lab studies in petri dishes have shown that these healing factors aid in growth of tissue. There have been a number of studies to show evidence that PRP is better in humans when injected compared to placebo, but there is no definitive evidence. Specifically, PRP injection does not result in relief consistently 100% of the time and is considered experimental. As such, PRP should not be viewed as curative but as a treatment modality that may offer relief.

## 2024-02-13 ENCOUNTER — TELEMEDICINE (OUTPATIENT)
Dept: FAMILY MEDICINE CLINIC | Facility: CLINIC | Age: 44
End: 2024-02-13
Payer: COMMERCIAL

## 2024-02-13 DIAGNOSIS — R07.81 RIB PAIN ON RIGHT SIDE: Primary | ICD-10-CM

## 2024-02-13 PROCEDURE — 99213 OFFICE O/P EST LOW 20 MIN: CPT | Performed by: NURSE PRACTITIONER

## 2024-02-13 NOTE — PROGRESS NOTES
Virtual Regular Visit    Verification of patient location:    Patient is located at Home in the following state in which I hold an active license [place]      Assessment/Plan:    Problem List Items Addressed This Visit        Other    Rib pain on right side - Primary     Suspect musculoskeletal in nature given pain is reproducible with movement and or touch  Try salon pas pain patch to area  Avoid lifting, twisting, pushing, pulling, splint area with cough/sneeze  Monitor for GI changes with bowels, nausea or vomiting                     Reason for visit is   Chief Complaint   Patient presents with   • Virtual Brief Visit     On and off right side by the rig cage , some movement make pain worst . Symptoms start about a month ago    • Virtual Regular Visit          Encounter provider SAE Cullen    Provider located at [facility]  [address] [address]      Recent Visits  No visits were found meeting these conditions.  Showing recent visits within past 7 days and meeting all other requirements  Today's Visits  Date Type Provider Dept   02/13/24 Telemedicine SAE Cullen [facility]   Showing today's visits and meeting all other requirements  Future Appointments  No visits were found meeting these conditions.  Showing future appointments within next 150 days and meeting all other requirements       The patient was identified by name and date of birth. Morenita Jensen was informed that this is a telemedicine visit and that the visit is being conducted through the Epic Embedded platform. She agrees to proceed..  My office door was closed. No one else was in the room.  She acknowledged consent and understanding of privacy and security of the video platform. The patient has agreed to participate and understands they can discontinue the visit at any time.    Patient is aware this is a billable service.     Subjective  Morenita Jensen is a 43  y.o. female with right sided rib pain .      Rt rib pain under the rib cage  Dull pain uncomfortable pain for about a month- comes and goes  Notices it more at night time but did notice it yesterday with driving, pain is worse with certain movements.  Has been taking pepcid once daily for last 2-3 weeks from cardiology for suspected reflux sysmptoms causing her chest pain episode back in December/January  No changes in her bowel movements, no excessive belching, flatus. Normal bowel movements, no nausea or vomiting  Did have diarrhea x 1 episode over the weekend hurts to lay on that side  Denies any trauma to chest wall, no fall, no heavy lifting, wisting, pushing or pulling injuries that she can recall             Past Medical History:   Diagnosis Date   • Atrial fibrillation (HCC)     last assessed 03/14/2014   • BRCA1 negative    • BRCA2 negative    • Complete transposition of great vessels     last assessed 02/14/2014   • Eczema    • GERD (gastroesophageal reflux disease)    • History of cardiac cath     Cath RVOT subpulmonary obstruction, last assessed 06/19/2012   • Hyperbilirubinemia (congenital)    • Migraine    • SSS (sick sinus syndrome) (HCC)    • Torsion of small intestine (HCC)        Past Surgical History:   Procedure Laterality Date   • ADENOIDECTOMY      onset childhood   • ATRIAL SEPTECTOMY      balloon transvenous method   • CARDIAC PACEMAKER PLACEMENT      last assessed 03/14/2014   • LAPAROSCOPIC LYSIS INTESTINAL ADHESIONS     • OOPHORECTOMY     • OTHER SURGICAL HISTORY      transposition repair mustard procedure, description 11 months   • TONSILLECTOMY     • TUBAL LIGATION      last assessed 08/17/2012   • US GUIDED BREAST BIOPSY RIGHT COMPLETE Right 11/18/2019       Current Outpatient Medications   Medication Sig Dispense Refill   • ascorbic acid (VITAMIN C) 500 mg tablet Take by mouth     • aspirin (ECOTRIN LOW STRENGTH) 81 mg EC tablet Take 81 mg by mouth daily     • betamethasone, augmented,  (DIPROLENE) 0.05 % ointment Apply topically 2 (two) times a day 30 g 0   • cetirizine (ZyrTEC) 10 mg tablet Take 10 mg by mouth daily     • Cholecalciferol 25 MCG (1000 UT) tablet Take 1,000 Units by mouth daily     • Cyanocobalamin (VITAMIN B 12 PO) Take by mouth     • ELDERBERRY PO Take by mouth     • meclizine (ANTIVERT) 25 mg tablet Take 1 tablet (25 mg total) by mouth every 8 (eight) hours as needed for dizziness 30 tablet 2   • metoprolol succinate (TOPROL-XL) 25 mg 24 hr tablet Take 25 mg by mouth daily     • mometasone (ELOCON) 0.1 % cream Apply topically daily 45 g 0   • MULTIPLE VITAMINS-MINERALS PO Take 1 tablet by mouth daily     • sotalol (BETAPACE) 120 mg tablet Take 1 tablet by mouth 2 (two) times a day     • furosemide (LASIX) 20 mg tablet Take 1 tablet (20 mg total) by mouth daily for 7 days 7 tablet 0     No current facility-administered medications for this visit.        Allergies   Allergen Reactions   • Enalapril      Reaction Date: 14Apr2011;   Pt denies 2/2018  Patient stated it may interact with sotalol.   • Other      Other reaction(s): Other  Tongue feels funny   • Penicillins Hives     Category: Allergy;        Review of Systems   Constitutional: Negative.    Respiratory:  Negative for cough, chest tightness, shortness of breath and wheezing.    Cardiovascular:  Negative for leg swelling.   Gastrointestinal: Negative.    Musculoskeletal:         Right rib pain   Neurological: Negative.    Hematological: Negative.        Video Exam    There were no vitals filed for this visit.    Physical Exam  Vitals and nursing note reviewed.   Constitutional:       General: She is not in acute distress.     Appearance: Normal appearance. She is well-developed and normal weight.   HENT:      Head: Normocephalic and atraumatic.   Eyes:      Conjunctiva/sclera: Conjunctivae normal.   Pulmonary:      Effort: Pulmonary effort is normal.   Skin:     Findings: No rash.   Neurological:      Mental Status: She is  alert and oriented to person, place, and time.   Psychiatric:         Behavior: Behavior normal.         Thought Content: Thought content normal.         Judgment: Judgment normal.          Visit Time  Total Visit Duration: 12 minutes

## 2024-02-13 NOTE — ASSESSMENT & PLAN NOTE
Suspect musculoskeletal in nature given pain is reproducible with movement and or touch  Try salon pas pain patch to area  Avoid lifting, twisting, pushing, pulling, splint area with cough/sneeze  Monitor for GI changes with bowels, nausea or vomiting

## 2024-02-15 DIAGNOSIS — L20.84 INTRINSIC ECZEMA: ICD-10-CM

## 2024-02-15 RX ORDER — MOMETASONE FUROATE 1 MG/G
CREAM TOPICAL DAILY
Qty: 45 G | Refills: 0 | Status: SHIPPED | OUTPATIENT
Start: 2024-02-15

## 2024-02-15 RX ORDER — BETAMETHASONE DIPROPIONATE 0.5 MG/G
OINTMENT, AUGMENTED TOPICAL 2 TIMES DAILY
Qty: 30 G | Refills: 0 | Status: SHIPPED | OUTPATIENT
Start: 2024-02-15

## 2024-02-21 PROBLEM — H10.10 ALLERGIC CONJUNCTIVITIS: Status: RESOLVED | Noted: 2017-06-20 | Resolved: 2024-02-21

## 2024-02-27 ENCOUNTER — OFFICE VISIT (OUTPATIENT)
Dept: FAMILY MEDICINE CLINIC | Facility: CLINIC | Age: 44
End: 2024-02-27
Payer: COMMERCIAL

## 2024-02-27 VITALS
SYSTOLIC BLOOD PRESSURE: 108 MMHG | WEIGHT: 142 LBS | HEIGHT: 62 IN | TEMPERATURE: 95.5 F | OXYGEN SATURATION: 99 % | BODY MASS INDEX: 26.13 KG/M2 | DIASTOLIC BLOOD PRESSURE: 74 MMHG | HEART RATE: 77 BPM

## 2024-02-27 DIAGNOSIS — R51.9 NEW ONSET HEADACHE: ICD-10-CM

## 2024-02-27 DIAGNOSIS — H53.9 VISION CHANGES: ICD-10-CM

## 2024-02-27 DIAGNOSIS — I42.9 CARDIOMYOPATHY, UNSPECIFIED TYPE (HCC): Primary | ICD-10-CM

## 2024-02-27 DIAGNOSIS — I49.5 SINOATRIAL NODE DYSFUNCTION (HCC): ICD-10-CM

## 2024-02-27 DIAGNOSIS — Q20.3 COMPLETE TRANSPOSITION OF GREAT VESSELS: ICD-10-CM

## 2024-02-27 PROBLEM — J96.01 ACUTE RESPIRATORY FAILURE WITH HYPOXIA (HCC): Status: ACTIVE | Noted: 2024-02-27

## 2024-02-27 PROCEDURE — 99214 OFFICE O/P EST MOD 30 MIN: CPT | Performed by: NURSE PRACTITIONER

## 2024-02-27 NOTE — PROGRESS NOTES
Name: Morenita Jensen      : 1980      MRN: [id number]  Encounter Provider: SAE Cullen  Encounter Date: 2024   Encounter department: [facility]    Assessment & Plan     1. Cardiomyopathy, unspecified type (HCC)  Assessment & Plan:  Stable, asymptomatic, continues follow up with cardiology        2. Sinoatrial node dysfunction (HCC)  Assessment & Plan:  Has pacemaker follows with [facility]  Continue sotalol and metoprolol      3. Complete transposition of great vessels  Assessment & Plan:  Follows with cardiology      4. New onset headache  Assessment & Plan:  Check CT head  Check labs  Less concern for temporal arteritis    Orders:  -     CT head wo contrast; Future; Expected date: 2024  -     C-reactive protein  -     Sedimentation rate, automated; Future  -     Sedimentation rate, automated    5. Vision changes  Assessment & Plan:  Check CT head  Check labs  Less concern for temporal arteritis    Orders:  -     CT head wo contrast; Future; Expected date: 2024         Subjective      Left sided forehead ache headache above left eye lasts for a few hours mostly at night, goes to bed and goes away, now happening more frequently. While eating dinner severe pain, felt her left eye was drooping, couldn't see correctly out of it. Went to bed and woke up resolved. Taking her normal allergy regimen    Had cardiac cath last week- had horrible headache Wednesday, Thursday, Friday this headache was all over, not localized above left eye.     Last eye appointment 2023-lenses changed.     Neurologic Problem  The patient's primary symptoms include a visual change. The patient's pertinent negatives include no altered mental status, clumsiness, focal sensory loss, focal weakness, loss of balance, memory loss, near-syncope, slurred speech, syncope or weakness. This is a new problem. The current episode started more than 1 month ago. The neurological problem developed  gradually. The problem has been gradually worsening since onset. There was facial focality noted. Associated symptoms include fatigue and headaches. Pertinent negatives include no abdominal pain, auditory change, aura, back pain, bladder incontinence, bowel incontinence, chest pain, confusion, diaphoresis, dizziness, fever, light-headedness, nausea, neck pain, palpitations, shortness of breath, vertigo or vomiting. Past treatments include acetaminophen, bed rest, eating and sleep. The treatment provided mild relief.         Review of Systems   Constitutional:  Positive for fatigue. Negative for diaphoresis and fever.   Respiratory:  Negative for shortness of breath.    Cardiovascular:  Negative for chest pain, palpitations and near-syncope.   Gastrointestinal:  Negative for abdominal pain, bowel incontinence, nausea and vomiting.   Genitourinary:  Negative for bladder incontinence.   Musculoskeletal:  Negative for back pain and neck pain.   Neurological:  Positive for headaches. Negative for dizziness, vertigo, focal weakness, syncope, weakness, light-headedness and loss of balance.   Psychiatric/Behavioral:  Negative for confusion and memory loss.        Current Outpatient Medications on File Prior to Visit   Medication Sig   • ascorbic acid (VITAMIN C) 500 mg tablet Take by mouth   • aspirin (ECOTRIN LOW STRENGTH) 81 mg EC tablet Take 81 mg by mouth daily   • betamethasone, augmented, (DIPROLENE) 0.05 % ointment Apply topically 2 (two) times a day   • cetirizine (ZyrTEC) 10 mg tablet Take 10 mg by mouth daily   • Cholecalciferol 25 MCG (1000 UT) tablet Take 1,000 Units by mouth daily   • Cyanocobalamin (VITAMIN B 12 PO) Take by mouth   • ELDERBERRY PO Take by mouth   • meclizine (ANTIVERT) 25 mg tablet Take 1 tablet (25 mg total) by mouth every 8 (eight) hours as needed for dizziness   • metoprolol succinate (TOPROL-XL) 25 mg 24 hr tablet Take 25 mg by mouth daily   • mometasone (ELOCON) 0.1 % cream Apply  "topically daily   • MULTIPLE VITAMINS-MINERALS PO Take 1 tablet by mouth daily   • sotalol (BETAPACE) 120 mg tablet Take 1 tablet by mouth 2 (two) times a day   • furosemide (LASIX) 20 mg tablet Take 1 tablet (20 mg total) by mouth daily for 7 days       Objective     /74   Pulse 77   Temp (!) 95.5 °F (35.3 °C) (Tympanic)   Ht 5' 2\" (1.575 m)   Wt 64.4 kg (142 lb)   SpO2 99%   BMI 25.97 kg/m²     Physical Exam  Vitals and nursing note reviewed.   Constitutional:       Appearance: Normal appearance. She is normal weight.   HENT:      Head: Normocephalic.   Eyes:      General: No scleral icterus.     Conjunctiva/sclera: Conjunctivae normal.      Pupils: Pupils are equal, round, and reactive to light.   Cardiovascular:      Rate and Rhythm: Normal rate and regular rhythm.      Heart sounds: Murmur heard.   Pulmonary:      Effort: Pulmonary effort is normal. No respiratory distress.      Breath sounds: Normal breath sounds.   Abdominal:      General: Bowel sounds are normal.      Palpations: Abdomen is soft.      Tenderness: There is abdominal tenderness (LLQ). There is no right CVA tenderness, left CVA tenderness or guarding.   Skin:     General: Skin is warm and dry.   Neurological:      General: No focal deficit present.      Mental Status: She is alert and oriented to person, place, and time. Mental status is at baseline.      Cranial Nerves: No cranial nerve deficit.      Sensory: No sensory deficit.      Motor: No weakness.      Coordination: Coordination normal.      Gait: Gait normal.      Deep Tendon Reflexes: Reflexes normal.   Psychiatric:         Mood and Affect: Mood normal.         Behavior: Behavior normal.       SAE Cullen    "

## 2024-03-03 PROBLEM — J96.01 ACUTE RESPIRATORY FAILURE WITH HYPOXIA (HCC): Status: RESOLVED | Noted: 2024-02-27 | Resolved: 2024-03-03

## 2024-03-03 PROBLEM — H53.9 VISION CHANGES: Status: ACTIVE | Noted: 2024-03-03

## 2024-03-11 ENCOUNTER — OFFICE VISIT (OUTPATIENT)
Dept: OBGYN CLINIC | Facility: CLINIC | Age: 44
End: 2024-03-11
Payer: COMMERCIAL

## 2024-03-11 VITALS
HEIGHT: 62 IN | DIASTOLIC BLOOD PRESSURE: 74 MMHG | WEIGHT: 142 LBS | SYSTOLIC BLOOD PRESSURE: 108 MMHG | BODY MASS INDEX: 26.13 KG/M2

## 2024-03-11 DIAGNOSIS — M77.12 LATERAL EPICONDYLITIS, LEFT ELBOW: Primary | ICD-10-CM

## 2024-03-11 DIAGNOSIS — M77.11 LATERAL EPICONDYLITIS OF RIGHT ELBOW: ICD-10-CM

## 2024-03-11 PROCEDURE — 99213 OFFICE O/P EST LOW 20 MIN: CPT | Performed by: FAMILY MEDICINE

## 2024-03-11 NOTE — PROGRESS NOTES
1. Lateral epicondylitis, left elbow  Ambulatory referral to Physical Therapy      2. Lateral epicondylitis of right elbow  Ambulatory referral to Physical Therapy        Orders Placed This Encounter   Procedures    Ambulatory referral to Physical Therapy        IMAGING STUDIES: (I personally reviewed images in PACS and report):         PAST REPORTS:        ASSESSMENT/PLAN:  Bilateral LE  Left Elbow PRP 1/30/24  FUI:     Repeat X-ray next visit: None    Return in about 6 weeks (around 4/22/2024).    Patient instructions below verbally summarized in person during encounter:  Patient Instructions   Continue home exercise program including theraband flexbar        __________________________________________________________________________    HISTORY OF PRESENT ILLNESS:  C/o right elbow 1.5 weeks lateral pain simliar to her left elbow LE.     Left elbow post PRP tenotomy follow-up: improving significantlyl. 75% overall. Attending Pt at [facility].           Review of Systems      Following history reviewed and update:    Past Medical History:   Diagnosis Date    Acute respiratory failure with hypoxia (HCC)     Atrial fibrillation (HCC)     last assessed 03/14/2014    BRCA1 negative     BRCA2 negative     Complete transposition of great vessels     last assessed 02/14/2014    Eczema     GERD (gastroesophageal reflux disease)     History of cardiac cath     Cath RVOT subpulmonary obstruction, last assessed 06/19/2012    Hyperbilirubinemia (congenital)     Migraine     SSS (sick sinus syndrome) (HCC)     Torsion of small intestine (HCC)      Past Surgical History:   Procedure Laterality Date    ADENOIDECTOMY      onset childhood    ATRIAL SEPTECTOMY      balloon transvenous method    CARDIAC PACEMAKER PLACEMENT      last assessed 03/14/2014    LAPAROSCOPIC LYSIS INTESTINAL ADHESIONS      OOPHORECTOMY      OTHER SURGICAL HISTORY      transposition repair mustard procedure, description 11 months    TONSILLECTOMY      TUBAL  "LIGATION      last assessed 08/17/2012    US GUIDED BREAST BIOPSY RIGHT COMPLETE Right 11/18/2019     Social History   Social History     Substance and Sexual Activity   Alcohol Use Yes    Comment: social     Social History     Substance and Sexual Activity   Drug Use No     Social History     Tobacco Use   Smoking Status Never   Smokeless Tobacco Never     Family History   Problem Relation Age of Onset    Cancer Father 63        hodgkins lymphoma     Melanoma Father     Colon cancer Sister 22        carcinoid tumor in the rectum    Heart disease Maternal Grandmother     Hyperlipidemia Maternal Grandmother     Diabetes Paternal Grandmother     Hypertension Paternal Grandmother     Lung cancer Paternal Grandmother     Heart disease Paternal Grandfather     Hyperlipidemia Paternal Grandfather     Lung cancer Paternal Grandfather     No Known Problems Mother     No Known Problems Daughter     Cancer Maternal Grandfather         tongue    Breast cancer Paternal Aunt 52    Ovarian cancer Paternal Aunt 30    No Known Problems Paternal Aunt     No Known Problems Maternal Aunt      Allergies   Allergen Reactions    Enalapril      Reaction Date: 14Apr2011;   Pt denies 2/2018  Patient stated it may interact with sotalol.    Other      Other reaction(s): Other  Tongue feels funny    Penicillins Hives     Category: Allergy;           Physical Exam  /74 (BP Location: Right arm, Patient Position: Sitting, Cuff Size: Standard)   Ht 5' 2\" (1.575 m)   Wt 64.4 kg (142 lb)   BMI 25.97 kg/m²         Right Elbow Exam     Tenderness   The patient is experiencing tenderness in the lateral epicondyle.     Range of Motion   The patient has normal right elbow ROM.    Muscle Strength   The patient has normal right elbow strength.    Other   Erythema: absent  Scars: absent  Sensation: normal    Comments:  Chief complaint of pain reproduced at lateral epicondyle with resisted isometric contraction of wrist and finger extensors. Worse " than left.       Left Elbow Exam     Tenderness   The patient is experiencing tenderness in the lateral epicondyle.     Range of Motion   The patient has normal left elbow ROM.    Muscle Strength   Pronation:  5/5   Supination:  5/5     Other   Erythema: absent  Sensation: normal    Comments:  Chief complaint of pain reproduced at lateral epicondyle with resisted isometric contraction of wrist and finger extensors            __________________________________________________________________________  Procedures

## 2024-03-12 ENCOUNTER — TELEPHONE (OUTPATIENT)
Dept: FAMILY MEDICINE CLINIC | Facility: CLINIC | Age: 44
End: 2024-03-12

## 2024-03-12 ENCOUNTER — HOSPITAL ENCOUNTER (OUTPATIENT)
Dept: CT IMAGING | Facility: HOSPITAL | Age: 44
Discharge: HOME | End: 2024-03-12
Payer: COMMERCIAL

## 2024-03-12 DIAGNOSIS — R51.9 NEW ONSET HEADACHE: ICD-10-CM

## 2024-03-12 DIAGNOSIS — H53.9 VISION CHANGES: ICD-10-CM

## 2024-03-12 PROCEDURE — 70450 CT HEAD/BRAIN W/O DYE: CPT

## 2024-03-12 NOTE — TELEPHONE ENCOUNTER
P2P completed and has been Auth   Auth #[id number]  From 03/06/2024-06/03/2024    Pre encounter has bee notify   Radha Haque at the pre encounter

## 2024-03-12 NOTE — TELEPHONE ENCOUNTER
Hi my name is Kylee Jensen. My YOB: 1980. I'm just calling to see about getting approval for my head CT scan I have set up. I had to reschedule it three Times [facility] because they said they don't have pre approval and I called the insurance company and they said I had to call the doctor's office for it. My phone number is [phone]

## 2024-03-13 ENCOUNTER — OFFICE VISIT (OUTPATIENT)
Dept: URGENT CARE | Facility: CLINIC | Age: 44
End: 2024-03-13
Payer: COMMERCIAL

## 2024-03-13 DIAGNOSIS — L20.84 INTRINSIC ECZEMA: Primary | ICD-10-CM

## 2024-03-13 PROCEDURE — 99213 OFFICE O/P EST LOW 20 MIN: CPT

## 2024-03-13 NOTE — PROGRESS NOTES
[facility]        NAME: Morenita Jensen is a 43 y.o. female  : 1980    MRN: [id number]  DATE: 2024  TIME: 6:17 PM    Assessment and Plan   Intrinsic eczema [L20.84]  1. Intrinsic eczema              Patient Instructions   Use the aquaphor to the eye lid.  And to the outer L side of the eye  Tiny amount of neosporin tot he eye lid for the irritation  Follow up with your PCP    Follow up with PCP in 3-5 days.  Proceed to  ER if symptoms worsen.    If tests have been performed at [facility], our office will contact you with results if changes need to be made to the care plan discussed with you at the visit.  You can review your full results on [facility].    Chief Complaint     Chief Complaint   Patient presents with    Rash     Pt presents raised red and itchy rash on the left eyelid x 3 weeks.  She has been using aquaphor but states no improvement.  Pt has hx of eczema.         History of Present Illness       This is a 43-year-old female who presents with irritation to the left eyelid.  She states it feels like her eczema.  She is not sure if she should use the betamethasone on her eyelid.  We discussed that we do not put steroids around the eye.  She can use Aquaphor on the lid.  She denies any other cold symptoms        Review of Systems   Review of Systems   Constitutional: Negative.    Eyes:  Negative for pain, discharge, redness and itching.        L eye lid irritation   Respiratory: Negative.     Cardiovascular: Negative.    Gastrointestinal: Negative.    Genitourinary: Negative.    Musculoskeletal: Negative.    Neurological: Negative.  Negative for headaches.   Hematological: Negative.    Psychiatric/Behavioral: Negative.           Current Medications       Current Outpatient Medications:     ascorbic acid (VITAMIN C) 500 mg tablet, Take by mouth, Disp: , Rfl:     aspirin (ECOTRIN LOW STRENGTH) 81 mg EC tablet, Take 81 mg by mouth daily, Disp: , Rfl:     betamethasone,  augmented, (DIPROLENE) 0.05 % ointment, Apply topically 2 (two) times a day, Disp: 30 g, Rfl: 0    cetirizine (ZyrTEC) 10 mg tablet, Take 10 mg by mouth daily, Disp: , Rfl:     Cholecalciferol 25 MCG (1000 UT) tablet, Take 1,000 Units by mouth daily, Disp: , Rfl:     Cyanocobalamin (VITAMIN B 12 PO), Take by mouth, Disp: , Rfl:     ELDERBERRY PO, Take by mouth, Disp: , Rfl:     meclizine (ANTIVERT) 25 mg tablet, Take 1 tablet (25 mg total) by mouth every 8 (eight) hours as needed for dizziness, Disp: 30 tablet, Rfl: 2    metoprolol succinate (TOPROL-XL) 25 mg 24 hr tablet, Take 25 mg by mouth daily, Disp: , Rfl:     mometasone (ELOCON) 0.1 % cream, Apply topically daily, Disp: 45 g, Rfl: 0    MULTIPLE VITAMINS-MINERALS PO, Take 1 tablet by mouth daily, Disp: , Rfl:     sotalol (BETAPACE) 120 mg tablet, Take 1 tablet by mouth 2 (two) times a day, Disp: , Rfl:     furosemide (LASIX) 20 mg tablet, Take 1 tablet (20 mg total) by mouth daily for 7 days, Disp: 7 tablet, Rfl: 0    Current Allergies     Allergies as of 03/13/2024 - Reviewed 03/13/2024   Allergen Reaction Noted    Enalapril  04/21/2012    Other  02/27/2009    Penicillins Hives 06/19/2012            The following portions of the patient's history were reviewed and updated as appropriate: allergies, current medications, past family history, past medical history, past social history, past surgical history and problem list.     Past Medical History:   Diagnosis Date    Acute respiratory failure with hypoxia (HCC)     Atrial fibrillation (HCC)     last assessed 03/14/2014    BRCA1 negative     BRCA2 negative     Complete transposition of great vessels     last assessed 02/14/2014    Eczema     GERD (gastroesophageal reflux disease)     History of cardiac cath     Cath RVOT subpulmonary obstruction, last assessed 06/19/2012    Hyperbilirubinemia (congenital)     Migraine     SSS (sick sinus syndrome) (HCC)     Torsion of small intestine (HCC)        Past Surgical  History:   Procedure Laterality Date    ADENOIDECTOMY      onset childhood    ATRIAL SEPTECTOMY      balloon transvenous method    CARDIAC CATHETERIZATION  02/20/2024    CARDIAC PACEMAKER PLACEMENT      last assessed 03/14/2014    LAPAROSCOPIC LYSIS INTESTINAL ADHESIONS      OOPHORECTOMY      OTHER SURGICAL HISTORY      transposition repair mustard procedure, description 11 months    TONSILLECTOMY      TUBAL LIGATION      last assessed 08/17/2012    US GUIDED BREAST BIOPSY RIGHT COMPLETE Right 11/18/2019       Family History   Problem Relation Age of Onset    Cancer Father 63        hodgkins lymphoma     Melanoma Father     Colon cancer Sister 22        carcinoid tumor in the rectum    Heart disease Maternal Grandmother     Hyperlipidemia Maternal Grandmother     Diabetes Paternal Grandmother     Hypertension Paternal Grandmother     Lung cancer Paternal Grandmother     Heart disease Paternal Grandfather     Hyperlipidemia Paternal Grandfather     Lung cancer Paternal Grandfather     No Known Problems Mother     No Known Problems Daughter     Cancer Maternal Grandfather         tongue    Breast cancer Paternal Aunt 52    Ovarian cancer Paternal Aunt 30    No Known Problems Paternal Aunt     No Known Problems Maternal Aunt          Medications have been verified.        Objective   There were no vitals taken for this visit.  No LMP recorded.       Physical Exam     Physical Exam  Constitutional:       Appearance: Normal appearance. She is normal weight.   HENT:      Head: Normocephalic and atraumatic.      Right Ear: Tympanic membrane, ear canal and external ear normal.      Left Ear: Tympanic membrane, ear canal and external ear normal.      Nose: Nose normal.      Mouth/Throat:      Mouth: Mucous membranes are moist.      Pharynx: Oropharynx is clear.   Eyes:      Conjunctiva/sclera: Conjunctivae normal.      Pupils: Pupils are equal, round, and reactive to light.   Cardiovascular:      Rate and Rhythm: Normal rate  and regular rhythm.      Pulses: Normal pulses.      Heart sounds: Murmur heard.   Pulmonary:      Effort: Pulmonary effort is normal.      Breath sounds: Normal breath sounds.   Abdominal:      General: Abdomen is flat. Bowel sounds are normal.   Musculoskeletal:         General: Normal range of motion.      Cervical back: Normal range of motion. No tenderness.   Lymphadenopathy:      Cervical: No cervical adenopathy.   Skin:     General: Skin is warm and dry.      Findings: Rash (L eye lid and outer L eye) present.   Neurological:      General: No focal deficit present.      Mental Status: She is alert and oriented to person, place, and time.   Psychiatric:         Mood and Affect: Mood normal.         Behavior: Behavior normal.         Thought Content: Thought content normal.         Judgment: Judgment normal.

## 2024-03-13 NOTE — PATIENT INSTRUCTIONS
Use the aquaphor to the eye lid.  And to the outer L side of the eye  Tiny amount of neosporin tot he eye lid for the irritation  Follow up with your PCP

## 2024-03-19 ENCOUNTER — TELEPHONE (OUTPATIENT)
Dept: FAMILY MEDICINE CLINIC | Facility: CLINIC | Age: 44
End: 2024-03-19

## 2024-03-19 NOTE — RESULT ENCOUNTER NOTE
Shekhar Xavier, Your CT head is normal.  If you are still having symptoms we will put referral in for neurology

## 2024-03-19 NOTE — TELEPHONE ENCOUNTER
----- Message from SAE Cullen sent at 3/19/2024 12:54 PM EDT -----  Shekhar Xavier, Your CT head is normal.  If you are still having symptoms we will put referral in for neurology

## 2024-03-24 ENCOUNTER — OFFICE VISIT (OUTPATIENT)
Dept: URGENT CARE | Facility: CLINIC | Age: 44
End: 2024-03-24
Payer: COMMERCIAL

## 2024-03-24 VITALS
SYSTOLIC BLOOD PRESSURE: 108 MMHG | OXYGEN SATURATION: 99 % | HEIGHT: 62 IN | WEIGHT: 145 LBS | BODY MASS INDEX: 26.68 KG/M2 | DIASTOLIC BLOOD PRESSURE: 66 MMHG | RESPIRATION RATE: 18 BRPM | HEART RATE: 92 BPM | TEMPERATURE: 98.2 F

## 2024-03-24 DIAGNOSIS — H01.004 BLEPHARITIS OF LEFT UPPER EYELID, UNSPECIFIED TYPE: Primary | ICD-10-CM

## 2024-03-24 DIAGNOSIS — H10.32 ACUTE BACTERIAL CONJUNCTIVITIS OF LEFT EYE: ICD-10-CM

## 2024-03-24 PROCEDURE — 99213 OFFICE O/P EST LOW 20 MIN: CPT | Performed by: NURSE PRACTITIONER

## 2024-03-24 RX ORDER — ERYTHROMYCIN 5 MG/G
0.5 OINTMENT OPHTHALMIC 4 TIMES DAILY
Qty: 3.5 G | Refills: 0 | Status: SHIPPED | OUTPATIENT
Start: 2024-03-24

## 2024-03-24 NOTE — PROGRESS NOTES
[facility]        NAME: Morenita Jensen is a 43 y.o. female  : 1980    MRN: [id number]  DATE: 2024  TIME: 10:34 AM    Assessment and Plan   Blepharitis of left upper eyelid, unspecified type [H01.004]  1. Blepharitis of left upper eyelid, unspecified type  erythromycin (ILOTYCIN) ophthalmic ointment      2. Acute bacterial conjunctivitis of left eye  erythromycin (ILOTYCIN) ophthalmic ointment        Acute symptomatic will recommend erythromycin ophthalmic ointment 4 times daily x 7 to 10 days educated on side effects proper use of medication follow-up with primary care with worsening symptoms no improvement    Patient Instructions       Follow up with PCP in 3-5 days.  Proceed to  ER if symptoms worsen.    If tests have been performed at [facility], our office will contact you with results if changes need to be made to the care plan discussed with you at the visit.  [facility]    Chief Complaint     Chief Complaint   Patient presents with   • Eye Problem     PT presents with left eye redness, irritation, and clear discharge.  She was seen x 2 weeks ago for rash around the eye and was directed to use aquaphor for symptom relief.  The rash has since resolved with new onset issues that started x 4 days ago.           History of Present Illness       Patient is a 43-year-old female arrives with complaints of dry skin flaking to the upper left eyelid and now started with redness itching drainage to the left eye.  Patient was seen recently in the urgent care setting and told that the flaking may be caused by eczema flaring up was told to use the Aquaphor however that has not relieved the issue.  Denies blurry vision double vision photophobia.        Review of Systems   Review of Systems   Constitutional:  Negative for activity change, appetite change, chills, fatigue and fever.   HENT:  Negative for congestion, postnasal drip, rhinorrhea, sneezing  and sore throat.    Eyes:  Positive for discharge, redness and itching. Negative for photophobia, pain and visual disturbance.   Respiratory:  Negative for cough, chest tightness, shortness of breath and wheezing.    Cardiovascular:  Negative for chest pain and palpitations.   Gastrointestinal:  Negative for abdominal pain, constipation, diarrhea, nausea and vomiting.   Musculoskeletal:  Negative for arthralgias and myalgias.   Skin:  Negative for color change, pallor and rash.   Neurological:  Negative for dizziness, weakness, light-headedness and headaches.   Hematological:  Negative for adenopathy.   Psychiatric/Behavioral:  Negative for agitation and confusion.          Current Medications       Current Outpatient Medications:   •  ascorbic acid (VITAMIN C) 500 mg tablet, Take by mouth, Disp: , Rfl:   •  aspirin (ECOTRIN LOW STRENGTH) 81 mg EC tablet, Take 81 mg by mouth daily, Disp: , Rfl:   •  cetirizine (ZyrTEC) 10 mg tablet, Take 10 mg by mouth daily, Disp: , Rfl:   •  Cholecalciferol 25 MCG (1000 UT) tablet, Take 1,000 Units by mouth daily, Disp: , Rfl:   •  Cyanocobalamin (VITAMIN B 12 PO), Take by mouth, Disp: , Rfl:   •  ELDERBERRY PO, Take by mouth, Disp: , Rfl:   •  erythromycin (ILOTYCIN) ophthalmic ointment, Administer 0.5 inches to both eyes 4 (four) times a day, Disp: 3.5 g, Rfl: 0  •  meclizine (ANTIVERT) 25 mg tablet, Take 1 tablet (25 mg total) by mouth every 8 (eight) hours as needed for dizziness, Disp: 30 tablet, Rfl: 2  •  metoprolol succinate (TOPROL-XL) 25 mg 24 hr tablet, Take 25 mg by mouth daily, Disp: , Rfl:   •  MULTIPLE VITAMINS-MINERALS PO, Take 1 tablet by mouth daily, Disp: , Rfl:   •  sotalol (BETAPACE) 120 mg tablet, Take 1 tablet by mouth 2 (two) times a day, Disp: , Rfl:   •  betamethasone, augmented, (DIPROLENE) 0.05 % ointment, Apply topically 2 (two) times a day (Patient not taking: Reported on 3/24/2024), Disp: 30 g, Rfl: 0  •  furosemide (LASIX) 20 mg tablet, Take 1  tablet (20 mg total) by mouth daily for 7 days (Patient not taking: Reported on 3/24/2024), Disp: 7 tablet, Rfl: 0  •  mometasone (ELOCON) 0.1 % cream, Apply topically daily (Patient not taking: Reported on 3/24/2024), Disp: 45 g, Rfl: 0    Current Allergies     Allergies as of 03/24/2024 - Reviewed 03/24/2024   Allergen Reaction Noted   • Enalapril  04/21/2012   • Other  02/27/2009   • Penicillins Hives 06/19/2012            The following portions of the patient's history were reviewed and updated as appropriate: allergies, current medications, past family history, past medical history, past social history, past surgical history and problem list.     Past Medical History:   Diagnosis Date   • Acute respiratory failure with hypoxia (HCC)    • Atrial fibrillation (HCC)     last assessed 03/14/2014   • BRCA1 negative    • BRCA2 negative    • Complete transposition of great vessels     last assessed 02/14/2014   • Eczema    • GERD (gastroesophageal reflux disease)    • History of cardiac cath     Cath RVOT subpulmonary obstruction, last assessed 06/19/2012   • Hyperbilirubinemia (congenital)    • Migraine    • SSS (sick sinus syndrome) (HCC)    • Torsion of small intestine (HCC)        Past Surgical History:   Procedure Laterality Date   • ADENOIDECTOMY      onset childhood   • ATRIAL SEPTECTOMY      balloon transvenous method   • CARDIAC CATHETERIZATION  02/20/2024   • CARDIAC PACEMAKER PLACEMENT      last assessed 03/14/2014   • LAPAROSCOPIC LYSIS INTESTINAL ADHESIONS     • OOPHORECTOMY     • OTHER SURGICAL HISTORY      transposition repair mustard procedure, description 11 months   • TONSILLECTOMY     • TUBAL LIGATION      last assessed 08/17/2012   • US GUIDED BREAST BIOPSY RIGHT COMPLETE Right 11/18/2019       Family History   Problem Relation Age of Onset   • Cancer Father 63        hodgkins lymphoma    • Melanoma Father    • Colon cancer Sister 22        carcinoid tumor in the rectum   • Heart disease Maternal  "Grandmother    • Hyperlipidemia Maternal Grandmother    • Diabetes Paternal Grandmother    • Hypertension Paternal Grandmother    • Lung cancer Paternal Grandmother    • Heart disease Paternal Grandfather    • Hyperlipidemia Paternal Grandfather    • Lung cancer Paternal Grandfather    • No Known Problems Mother    • No Known Problems Daughter    • Cancer Maternal Grandfather         tongue   • Breast cancer Paternal Aunt 52   • Ovarian cancer Paternal Aunt 30   • No Known Problems Paternal Aunt    • No Known Problems Maternal Aunt          Medications have been verified.        Objective   /66   Pulse 92   Temp 98.2 °F (36.8 °C)   Resp 18   Ht 5' 2\" (1.575 m)   Wt 65.8 kg (145 lb)   SpO2 99%   BMI 26.52 kg/m²   No LMP recorded.       Physical Exam     Physical Exam  Vitals and nursing note reviewed.   Constitutional:       General: She is not in acute distress.     Appearance: Normal appearance. She is not ill-appearing or diaphoretic.   HENT:      Head: Normocephalic and atraumatic.      Nose: No congestion or rhinorrhea.   Eyes:      General: No scleral icterus.        Right eye: No discharge.         Left eye: No discharge.      Conjunctiva/sclera:      Left eye: Left conjunctiva is injected. Exudate present.        Comments: Notable redness with flaking to the upper left eyelid-redness exudate crusting   Pulmonary:      Effort: Pulmonary effort is normal. No respiratory distress.   Musculoskeletal:         General: Normal range of motion.      Cervical back: Normal range of motion.   Skin:     Coloration: Skin is not jaundiced or pale.      Findings: No bruising, erythema or rash.   Neurological:      General: No focal deficit present.      Mental Status: She is alert and oriented to person, place, and time.   Psychiatric:         Mood and Affect: Mood normal.         Behavior: Behavior normal.         Thought Content: Thought content normal.         Judgment: Judgment normal.                   "

## 2024-04-09 ENCOUNTER — OFFICE VISIT (OUTPATIENT)
Dept: FAMILY MEDICINE CLINIC | Facility: CLINIC | Age: 44
End: 2024-04-09

## 2024-04-09 VITALS
DIASTOLIC BLOOD PRESSURE: 70 MMHG | OXYGEN SATURATION: 97 % | SYSTOLIC BLOOD PRESSURE: 122 MMHG | HEART RATE: 73 BPM | WEIGHT: 145 LBS | TEMPERATURE: 96.3 F | HEIGHT: 62 IN | BODY MASS INDEX: 26.68 KG/M2

## 2024-04-09 DIAGNOSIS — R31.9 HEMATURIA, UNSPECIFIED TYPE: ICD-10-CM

## 2024-04-09 DIAGNOSIS — N92.6 IRREGULAR MENSES: ICD-10-CM

## 2024-04-09 DIAGNOSIS — R10.2 PELVIC PAIN: ICD-10-CM

## 2024-04-09 DIAGNOSIS — R12 HEARTBURN: Primary | ICD-10-CM

## 2024-04-09 LAB
SL AMB  POCT GLUCOSE, UA: NEGATIVE
SL AMB LEUKOCYTE ESTERASE,UA: NEGATIVE
SL AMB POCT BILIRUBIN,UA: NEGATIVE
SL AMB POCT BLOOD,UA: ABNORMAL
SL AMB POCT CLARITY,UA: ABNORMAL
SL AMB POCT COLOR,UA: YELLOW
SL AMB POCT KETONES,UA: NEGATIVE
SL AMB POCT NITRITE,UA: NEGATIVE
SL AMB POCT PH,UA: 7
SL AMB POCT SPECIFIC GRAVITY,UA: 1.02
SL AMB POCT URINE HCG: NEGATIVE
SL AMB POCT URINE PROTEIN: NEGATIVE
SL AMB POCT UROBILINOGEN: ABNORMAL

## 2024-04-09 NOTE — PROGRESS NOTES
Name: Morenita Jensen      : 1980      MRN: [id number]  Encounter Provider: SAE Cullen  Encounter Date: 2024   Encounter department: [facility]    Assessment & Plan     1. Heartburn  Assessment & Plan:  Recommend GI evaluation  Continue pepcid    Orders:  -     Ambulatory Referral to Gastroenterology; Future    2. Pelvic pain  Assessment & Plan:  Negative urine pregnancy  Check CT abd/pelvis given hematuria, pelvic pain  Recommend follow up with GYN    Orders:  -     POCT urine dip  -     POCT urine HCG  -     CT abdomen pelvis w wo contrast; Future; Expected date: 2024    3. Irregular menses  Assessment & Plan:  HCG negative    Orders:  -     POCT urine HCG    4. Hematuria, unspecified type  Assessment & Plan:  Check CT abd/pelvis    Orders:  -     CT abdomen pelvis w wo contrast; Future; Expected date: 2024         Subjective        Getting heartburn regularly, indigestion, increased belching, no acid reflux. No difficulty swallowing, no changes in taste. Taking pepcid OTC once daily which does help.     Hasn't had period for 65 days, negative pregnancy tests, Left lower abdominal radiates around to back, just started with light brown vaginal discharge. HCG 24. Discussed with her GYN, start multivitamin. Last pap was December normal- has never had abnormal pap. No hot flashes.     When she bends forward has a funny feeling/pressure on right side of abdomen. Normal bowel movements. No urinary burning, urgency, frequency, denies constipation, goes every other day. Soft stool. Feels she fully empties when she has a bowel movement        Heartburn  She complains of abdominal pain and heartburn. She reports no nausea.       Review of Systems   Constitutional: Negative.  Negative for fever and unexpected weight change.   HENT: Negative.     Gastrointestinal:  Positive for abdominal pain and heartburn. Negative for constipation, diarrhea, nausea and  "vomiting.        Heartburn  Bloating     Genitourinary:  Positive for frequency. Negative for decreased urine volume, difficulty urinating, dysuria, hematuria, menstrual problem and urgency.   Skin:  Negative for rash.   Hematological: Negative.        Current Outpatient Medications on File Prior to Visit   Medication Sig   • ascorbic acid (VITAMIN C) 500 mg tablet Take by mouth   • aspirin (ECOTRIN LOW STRENGTH) 81 mg EC tablet Take 81 mg by mouth daily   • cetirizine (ZyrTEC) 10 mg tablet Take 10 mg by mouth daily   • Cholecalciferol 25 MCG (1000 UT) tablet Take 1,000 Units by mouth daily   • Cyanocobalamin (VITAMIN B 12 PO) Take by mouth   • ELDERBERRY PO Take by mouth   • erythromycin (ILOTYCIN) ophthalmic ointment Administer 0.5 inches to both eyes 4 (four) times a day   • meclizine (ANTIVERT) 25 mg tablet Take 1 tablet (25 mg total) by mouth every 8 (eight) hours as needed for dizziness   • metoprolol succinate (TOPROL-XL) 25 mg 24 hr tablet Take 25 mg by mouth daily   • MULTIPLE VITAMINS-MINERALS PO Take 1 tablet by mouth daily   • sotalol (BETAPACE) 120 mg tablet Take 1 tablet by mouth 2 (two) times a day   • betamethasone, augmented, (DIPROLENE) 0.05 % ointment Apply topically 2 (two) times a day (Patient not taking: Reported on 3/24/2024)   • furosemide (LASIX) 20 mg tablet Take 1 tablet (20 mg total) by mouth daily for 7 days (Patient not taking: Reported on 3/24/2024)   • mometasone (ELOCON) 0.1 % cream Apply topically daily (Patient not taking: Reported on 3/24/2024)       Objective     /70   Pulse 73   Temp (!) 96.3 °F (35.7 °C) (Tympanic)   Ht 5' 2\" (1.575 m)   Wt 65.8 kg (145 lb)   SpO2 97%   BMI 26.52 kg/m²     Physical Exam  Vitals and nursing note reviewed.   Constitutional:       Appearance: Normal appearance. She is normal weight.   HENT:      Head: Normocephalic.   Eyes:      General: No scleral icterus.     Conjunctiva/sclera: Conjunctivae normal.      Pupils: Pupils are equal, " round, and reactive to light.   Cardiovascular:      Rate and Rhythm: Normal rate and regular rhythm.      Heart sounds: Murmur heard.   Pulmonary:      Effort: Pulmonary effort is normal. No respiratory distress.      Breath sounds: Normal breath sounds.   Abdominal:      General: Bowel sounds are normal.      Palpations: Abdomen is soft.      Tenderness: There is abdominal tenderness (LLQ). There is no right CVA tenderness, left CVA tenderness or guarding.   Skin:     General: Skin is warm and dry.   Neurological:      General: No focal deficit present.      Mental Status: She is alert and oriented to person, place, and time. Mental status is at baseline.      Cranial Nerves: No cranial nerve deficit.      Sensory: No sensory deficit.      Motor: No weakness.      Coordination: Coordination normal.      Gait: Gait normal.      Deep Tendon Reflexes: Reflexes normal.   Psychiatric:         Mood and Affect: Mood normal.         Behavior: Behavior normal.       SAE Cullen

## 2024-04-12 PROBLEM — R12 HEARTBURN: Status: ACTIVE | Noted: 2024-04-12

## 2024-04-12 PROBLEM — N92.6 IRREGULAR MENSES: Status: ACTIVE | Noted: 2024-04-12

## 2024-04-12 PROBLEM — R31.9 HEMATURIA: Status: ACTIVE | Noted: 2024-04-12

## 2024-04-12 NOTE — ASSESSMENT & PLAN NOTE
Negative urine pregnancy  Check CT abd/pelvis given hematuria, pelvic pain  Recommend follow up with GYN

## 2024-04-19 DIAGNOSIS — L20.84 INTRINSIC ECZEMA: Primary | ICD-10-CM

## 2024-04-22 ENCOUNTER — APPOINTMENT (OUTPATIENT)
Dept: RADIOLOGY | Facility: CLINIC | Age: 44
End: 2024-04-22
Payer: COMMERCIAL

## 2024-04-22 ENCOUNTER — TELEPHONE (OUTPATIENT)
Dept: GASTROENTEROLOGY | Facility: CLINIC | Age: 44
End: 2024-04-22

## 2024-04-22 ENCOUNTER — OFFICE VISIT (OUTPATIENT)
Dept: OBGYN CLINIC | Facility: CLINIC | Age: 44
End: 2024-04-22
Payer: COMMERCIAL

## 2024-04-22 ENCOUNTER — OFFICE VISIT (OUTPATIENT)
Dept: GASTROENTEROLOGY | Facility: CLINIC | Age: 44
End: 2024-04-22
Payer: COMMERCIAL

## 2024-04-22 VITALS
HEIGHT: 62 IN | BODY MASS INDEX: 26.68 KG/M2 | SYSTOLIC BLOOD PRESSURE: 98 MMHG | WEIGHT: 145 LBS | DIASTOLIC BLOOD PRESSURE: 64 MMHG

## 2024-04-22 VITALS
SYSTOLIC BLOOD PRESSURE: 130 MMHG | DIASTOLIC BLOOD PRESSURE: 80 MMHG | BODY MASS INDEX: 26.31 KG/M2 | HEIGHT: 62 IN | WEIGHT: 143 LBS

## 2024-04-22 DIAGNOSIS — R12 HEARTBURN: Primary | ICD-10-CM

## 2024-04-22 DIAGNOSIS — Z80.0 FAMILY HISTORY OF COLON CANCER: ICD-10-CM

## 2024-04-22 DIAGNOSIS — M77.12 LATERAL EPICONDYLITIS, LEFT ELBOW: ICD-10-CM

## 2024-04-22 DIAGNOSIS — M25.521 PAIN IN RIGHT ELBOW: ICD-10-CM

## 2024-04-22 DIAGNOSIS — M77.11 LATERAL EPICONDYLITIS, RIGHT ELBOW: Primary | ICD-10-CM

## 2024-04-22 PROCEDURE — 99213 OFFICE O/P EST LOW 20 MIN: CPT | Performed by: FAMILY MEDICINE

## 2024-04-22 PROCEDURE — 73080 X-RAY EXAM OF ELBOW: CPT

## 2024-04-22 PROCEDURE — 99204 OFFICE O/P NEW MOD 45 MIN: CPT | Performed by: INTERNAL MEDICINE

## 2024-04-22 NOTE — ASSESSMENT & PLAN NOTE
The patient's heartburn is currently well-managed with Pepcid. She is advised to continue with conservative measures, including the avoidance of triggers, avoidance of late-night meals, and engage in regular exercise. The patient was counseled to take a break every quarter to reduce resistance or build tolerance to Pepcid. Additionally, it was communicated that if she experiences significant improvement and wishes to occasionally take Tums, this is permissible. However, if her symptoms worsen, consideration may be given to transitioning to Prilosec or omeprazole 20 mg once daily.

## 2024-04-22 NOTE — TELEPHONE ENCOUNTER
Scheduled date of colonoscopy (as of today): 6/5/24  Physician performing colonoscopy: YOSEF  Location of colonoscopy: [facility]  Bowel prep reviewed with patient: Miralax  Instructions reviewed with patient by: NATASHA  Clearances: [facility]   - Dr. Nguyen Deshpande ([facility])

## 2024-04-22 NOTE — PROGRESS NOTES
1. Lateral epicondylitis, right elbow  XR elbow 3+ vw right      2. Lateral epicondylitis, left elbow          Orders Placed This Encounter   Procedures    XR elbow 3+ vw right        IMAGING STUDIES: (I personally reviewed images in PACS and report):  Xray right elbow 4/22/24:  No acute osseous abnormality      PAST REPORTS:        ASSESSMENT/PLAN:  Let Elbow Lateral Epicondylitis s/p PRP & Tenotomy 1/30/224- 100% improved  Right Elbow Lateral Epicondylitis- uncontrolled    Repeat X-ray next visit: None    Return for Follow-up for Ultrasound Guided Injection PRP LOYD.    Patient instructions below verbally summarized in person during encounter:  Patient Instructions   Tennis elbow (lateral epicondylitis) or its cousin, Golfer’s elbow (medial epicondyltitis), are irritations and inflammations of the anchor points of your forearm muscles at your elbow. Tennis elbow is usually caused by overuse, extending your wrist (pushing wrist up) and golfer’s elbow by flexion of the wrist (pushing wrist down). This takes at least 6 weeks to heal and usually up to 3 months to see complete resolution. Injections help to reduce pain significantly but the pain will return if you do not perform physical therapy. The curative treatment is physical therapy. Injections, tennis elbow straps, and anti-inflammatories only help to reduce pain. This is a clinical diagnosis and may require xray but MRI is usually not considered until at least 3 months of failed treatment.  Surgery is generally reserved for cases which do not improve after 6 months of conservative treatment. (CAN Chan 2017).      Theraband flexbar  Begin with red bar, advance to green bar after 3-4 weeks  8-12 reps for 9 sets every other day    PRP stands for Platelet Rich Plasma and is a Injectate solution prepared from a patient's own blood that has a high concentration of platelets. Blood is drawn from the patient in the same way that occurs during a routine blood  draw. A special machine then extracts platelet growth factors and other natural chemicals from the patient's own blood that are hypothesized to help healing tissue. PRP has been used to help healing tissues since approximately 1999, and specifically, for arthritis and cartilage problems since 2003.     Injection of PRP is recommended under ultrasound to attain visuzlization of needle and injectate into target site. Once injected, PRP begins to clot and within 10 minutes of clotting, the platelets begin to secrete their PDGFs (Platelet derived growth factors) which help to aid healing. Lab studies in petri dishes have shown that these healing factors aid in growth of tissue. There have been a number of studies to show evidence that PRP is better in humans when injected compared to placebo, but there is no definitive evidence. Specifically, PRP injection does not result in relief consistently 100% of the time and is considered experimental. As such, PRP should not be viewed as curative but as a treatment modality that may offer relief.         __________________________________________________________________________    HISTORY OF PRESENT ILLNESS:    Let Elbow Lateral Epicondylitis s/p PRP & Tenotomy 1/30/224- 100% improved. Denies any pain.     Right Elbow Lateral Epicondylitis- no improvement despite physical therapy 2x per week for >6 weeks.         Review of Systems      Following history reviewed and update:    Past Medical History:   Diagnosis Date    Acute respiratory failure with hypoxia (HCC)     Atrial fibrillation (HCC)     last assessed 03/14/2014    BRCA1 negative     BRCA2 negative     Complete transposition of great vessels     last assessed 02/14/2014    Eczema     GERD (gastroesophageal reflux disease)     History of cardiac cath     Cath RVOT subpulmonary obstruction, last assessed 06/19/2012    Hyperbilirubinemia (congenital)     Migraine     SSS (sick sinus syndrome) (HCC)     Torsion of small  "intestine (HCC)      Past Surgical History:   Procedure Laterality Date    ADENOIDECTOMY      onset childhood    ATRIAL SEPTECTOMY      balloon transvenous method    CARDIAC CATHETERIZATION  02/20/2024    CARDIAC PACEMAKER PLACEMENT      last assessed 03/14/2014    LAPAROSCOPIC LYSIS INTESTINAL ADHESIONS      OOPHORECTOMY      OTHER SURGICAL HISTORY      transposition repair mustard procedure, description 11 months    TONSILLECTOMY      TUBAL LIGATION      last assessed 08/17/2012    US GUIDED BREAST BIOPSY RIGHT COMPLETE Right 11/18/2019     Social History   Social History     Substance and Sexual Activity   Alcohol Use Yes    Comment: social     Social History     Substance and Sexual Activity   Drug Use No     Social History     Tobacco Use   Smoking Status Never   Smokeless Tobacco Never     Family History   Problem Relation Age of Onset    Cancer Father 63        hodgkins lymphoma     Melanoma Father     Colon cancer Sister 22        carcinoid tumor in the rectum    Heart disease Maternal Grandmother     Hyperlipidemia Maternal Grandmother     Diabetes Paternal Grandmother     Hypertension Paternal Grandmother     Lung cancer Paternal Grandmother     Heart disease Paternal Grandfather     Hyperlipidemia Paternal Grandfather     Lung cancer Paternal Grandfather     No Known Problems Mother     No Known Problems Daughter     Cancer Maternal Grandfather         tongue    Breast cancer Paternal Aunt 52    Ovarian cancer Paternal Aunt 30    No Known Problems Paternal Aunt     No Known Problems Maternal Aunt      Allergies   Allergen Reactions    Enalapril      Reaction Date: 14Apr2011;   Pt denies 2/2018  Patient stated it may interact with sotalol.    Other      Other reaction(s): Other  Tongue feels funny    Penicillins Hives     Category: Allergy;           Physical Exam  BP 98/64 (BP Location: Left arm, Patient Position: Sitting, Cuff Size: Standard)   Ht 5' 2\" (1.575 m)   Wt 65.8 kg (145 lb)   BMI 26.52 " kg/m²         Right Elbow Exam     Tenderness   The patient is experiencing tenderness in the lateral epicondyle.     Range of Motion   The patient has normal right elbow ROM.    Muscle Strength   The patient has normal right elbow strength.    Other   Erythema: absent  Scars: absent  Sensation: normal    Comments:  Chief complaint of pain reproduced at lateral epicondyle with resisted isometric contraction of wrist and finger extensors      Left Elbow Exam     Tenderness   Left elbow tenderness location: nontender LE.     Range of Motion   The patient has normal left elbow ROM.    Other   Erythema: absent  Sensation: normal    Comments:  No pain reproduced at lateral epicondyle with resisted isometric contraction of wrist and finger extensors            __________________________________________________________________________  Procedures

## 2024-04-22 NOTE — PROGRESS NOTES
Formerly [facility] - Outpatient Consultation  Morenita Jensen 43 y.o. female MRN: [id number]  Encounter: [id number]    ASSESSMENT AND PLAN:    1. Heartburn  Assessment & Plan:  The patient's heartburn is currently well-managed with Pepcid. She is advised to continue with conservative measures, including the avoidance of triggers, avoidance of late-night meals, and engage in regular exercise. The patient was counseled to take a break every quarter to reduce resistance or build tolerance to Pepcid. Additionally, it was communicated that if she experiences significant improvement and wishes to occasionally take Tums, this is permissible. However, if her symptoms worsen, consideration may be given to transitioning to Prilosec or omeprazole 20 mg once daily.    Orders:  -     Ambulatory Referral to Gastroenterology    2. Family history of colon cancer  -     Colonoscopy; Future; Expected date: 04/22/2024      In summary, this is a 43-year-old female with a past medical history that includes transposition of the great vessels, a status post Mustard operation at 11 months of age, and a history of cardiac dysrhythmias. She is currently on sotalol and is presenting for an evaluation of heartburn.    Family history of colorectal cancer.  Given the patient's family history of colorectal cancer in her sister, the patient is currently due for colorectal cancer screening. Clearance from her cardiologist will be obtained, and she will undergo a colonoscopy at the hospital.    The patient will follow-up accordingly.    ---    Chief Complaint   Patient presents with    Heartburn     Was occurring at night time but now it is during day, takes Pepcid       HPI:   Morenita Jensen is a 43 y.o. year old female who is presenting as a consultation from SAE Cullen regarding heartburn. The patient has a medical history of complete transposition of the great vessels, status post a Mustard  operation at 11 months, and a history of sinoatrial node dysfunction. The last upper EGD was performed in 2003 and was unremarkable.    The patient reports experiencing heartburn, for which she takes Pepcid almost daily. She describes the heartburn as a burning sensation in her chest accompanied by excessive gas. She denies experiencing vomiting, but admits to intermittent nausea. She denies dysphagia or food impaction. On Sandeep ruben, 12/25/2023, she experienced nocturnal coughing, which led to a hospital admission due to pulmonary edema. Lasix was prescribed, but it did not alleviate her symptoms. Her cardiologist and family doctor diagnosed her with acid reflux. She avoids eating after 7:00 PM. Pepcid provides relief for her symptoms, but she occasionally takes an additional dose. She experiences intermittent abdominal pain, particularly when bending over, which she attributes to her small bowel obstruction. She denies any changes in bowel movements, diarrhea, constipation, or hematochezia. She does not regularly take over-the-counter medications such as Motrin, Aleve, or Advil and is not on any diabetes medications. She underwent an endoscopy in 2003 but has not had a colonoscopy.    She sees her cardiologist, Dr. Dafne Boston, once a year. She last consulted with her pacemaker doctor, Dr. Frankel, approximately 10 months to a year ago but she has a scheduled appointment with him. She had a cardiac catheterization done in 02/2024. She had eczema on her eye. Her family doctor called in antibiotic ointment, which has helped. She had a small bowel obstruction in 2012.    Her sister had a colorectal tumor in her rectum when she was 22 years old.      Review of Systems:  The pertinent positive and negative findings are as noted in the HPI.    LAB/RADIOLOGY/ENDOSCOPY RESULTS:       Historical Information   Past Medical History:   Diagnosis Date    Acute respiratory failure with hypoxia (HCC)     Atrial fibrillation  (HCC)     last assessed 03/14/2014    BRCA1 negative     BRCA2 negative     Complete transposition of great vessels     last assessed 02/14/2014    Eczema     GERD (gastroesophageal reflux disease)     History of cardiac cath     Cath RVOT subpulmonary obstruction, last assessed 06/19/2012    Hyperbilirubinemia (congenital)     Migraine     SSS (sick sinus syndrome) (HCC)     Torsion of small intestine (HCC)      Past Surgical History:   Procedure Laterality Date    ADENOIDECTOMY      onset childhood    ATRIAL SEPTECTOMY      balloon transvenous method    CARDIAC CATHETERIZATION  02/20/2024    CARDIAC PACEMAKER PLACEMENT      last assessed 03/14/2014    LAPAROSCOPIC LYSIS INTESTINAL ADHESIONS      OOPHORECTOMY      OTHER SURGICAL HISTORY      transposition repair mustard procedure, description 11 months    TONSILLECTOMY      TUBAL LIGATION      last assessed 08/17/2012    US GUIDED BREAST BIOPSY RIGHT COMPLETE Right 11/18/2019     Social History     Substance and Sexual Activity   Alcohol Use Yes    Comment: social     Social History     Substance and Sexual Activity   Drug Use No     Social History     Tobacco Use   Smoking Status Never   Smokeless Tobacco Never     Family History   Problem Relation Age of Onset    Cancer Father 63        hodgkins lymphoma     Melanoma Father     Colon cancer Sister 22        carcinoid tumor in the rectum    Heart disease Maternal Grandmother     Hyperlipidemia Maternal Grandmother     Diabetes Paternal Grandmother     Hypertension Paternal Grandmother     Lung cancer Paternal Grandmother     Heart disease Paternal Grandfather     Hyperlipidemia Paternal Grandfather     Lung cancer Paternal Grandfather     No Known Problems Mother     No Known Problems Daughter     Cancer Maternal Grandfather         tongue    Breast cancer Paternal Aunt 52    Ovarian cancer Paternal Aunt 30    No Known Problems Paternal Aunt     No Known Problems Maternal Aunt        Meds/Allergies     Current  "Outpatient Medications:     ascorbic acid (VITAMIN C) 500 mg tablet    aspirin (ECOTRIN LOW STRENGTH) 81 mg EC tablet    cetirizine (ZyrTEC) 10 mg tablet    Cholecalciferol 25 MCG (1000 UT) tablet    Cyanocobalamin (VITAMIN B 12 PO)    ELDERBERRY PO    erythromycin (ILOTYCIN) ophthalmic ointment    meclizine (ANTIVERT) 25 mg tablet    metoprolol succinate (TOPROL-XL) 25 mg 24 hr tablet    MULTIPLE VITAMINS-MINERALS PO    mupirocin (BACTROBAN) 2 % ointment    sotalol (BETAPACE) 120 mg tablet    betamethasone, augmented, (DIPROLENE) 0.05 % ointment    furosemide (LASIX) 20 mg tablet    mometasone (ELOCON) 0.1 % cream  Allergies   Allergen Reactions    Enalapril      Reaction Date: 14Apr2011;   Pt denies 2/2018  Patient stated it may interact with sotalol.    Other      Other reaction(s): Other  Tongue feels funny    Penicillins Hives     Category: Allergy;        PHYSICAL EXAM:    Blood pressure 130/80, height 5' 2\", weight 64.9 kg (143 lb). Body mass index is 26.16 kg/m².  General Appearance: No apparent distress, cooperative, alert.  Eyes: Anicteric.  Gastrointestinal: Soft, non-tender, non-distended; normal bowel sounds; no masses, no organomegaly.  Rectal: Deferred.  Musculoskeletal: No edema.  Skin: No jaundice.    OTHER LAB RESULTS:   Lab Results   Component Value Date    WBC 12.52 (H) 12/25/2023    WBC 7.45 12/25/2023    WBC 7.8 08/09/2017    HGB 13.3 12/25/2023    HGB 13.5 12/25/2023    HGB 14.1 12/25/2023    MCV 89 12/25/2023     12/25/2023     12/25/2023     08/09/2017    INR 0.95 12/25/2023     Lab Results   Component Value Date     08/09/2017    K 4.1 12/25/2023     12/25/2023    CO2 24 12/25/2023    BUN 16 12/25/2023    CREATININE 0.65 12/25/2023    GLUCOSE 99 08/09/2017    GLUF 119 (H) 12/25/2023    CALCIUM 9.4 12/25/2023    AST 16 12/25/2023    AST 14 02/07/2019    AST 14 02/05/2018    ALT 14 12/25/2023    ALT 19 02/07/2019    ALT 18 02/05/2018    ALKPHOS 65 12/25/2023 " "   ALKPHOS 45 02/07/2019    ALKPHOS 47 02/05/2018    PROT 6.6 08/09/2017    BILITOT 1.2 08/09/2017    BILITOT 1.4 (H) 04/04/2016    EGFR 110 12/25/2023     No results found for: \"IRON\", \"TIBC\", \"FERRITIN\"  No results found for: \"LIPASE\"    OTHER RADIOLOGY RESULTS:   XR elbow 3+ vw right    Result Date: 4/22/2024  Narrative: RIGHT ELBOW INDICATION:   Pain in right elbow. COMPARISON:  None. VIEWS:  XR ELBOW 3+ VW RIGHT Images: 4 FINDINGS: There is no acute fracture or dislocation. There is no joint effusion. No significant degenerative changes. No lytic or blastic osseous lesion. Soft tissues are unremarkable.     Impression: No acute osseous abnormality. Electronically signed: 04/22/2024 09:08 AM Rian Yao MPH,MD    CARDIAC DEVICE CHECK - REMOTE    Result Date: 4/9/2024  Narrative: Morenita Jensen performed a remote single chamber atrial pacemaker check. Device interrogation demonstrated normal function with stable lead parameters. There were no atrial arrhythmias, only double counting on the A lead. Battery voltage remains adequate. The patient will transmit again in approximately three months.       Transcribed for Deon Bui MD, by Lissett Reynolds on 04/22/24 at 5:07 PM. Powered by Dragon [employer].  "

## 2024-04-29 ENCOUNTER — TELEPHONE (OUTPATIENT)
Dept: FAMILY MEDICINE CLINIC | Facility: CLINIC | Age: 44
End: 2024-04-29

## 2024-04-29 ENCOUNTER — OFFICE VISIT (OUTPATIENT)
Dept: FAMILY MEDICINE CLINIC | Facility: CLINIC | Age: 44
End: 2024-04-29
Payer: COMMERCIAL

## 2024-04-29 VITALS
WEIGHT: 140 LBS | HEART RATE: 90 BPM | BODY MASS INDEX: 25.76 KG/M2 | TEMPERATURE: 97.8 F | HEIGHT: 62 IN | SYSTOLIC BLOOD PRESSURE: 106 MMHG | DIASTOLIC BLOOD PRESSURE: 70 MMHG | OXYGEN SATURATION: 97 %

## 2024-04-29 DIAGNOSIS — R21 RASH AND NONSPECIFIC SKIN ERUPTION: Primary | ICD-10-CM

## 2024-04-29 PROCEDURE — 99213 OFFICE O/P EST LOW 20 MIN: CPT | Performed by: NURSE PRACTITIONER

## 2024-04-29 RX ORDER — CEPHALEXIN 500 MG/1
500 CAPSULE ORAL 2 TIMES DAILY
Qty: 14 CAPSULE | Refills: 0 | Status: SHIPPED | OUTPATIENT
Start: 2024-04-29 | End: 2024-05-06

## 2024-04-29 NOTE — PROGRESS NOTES
Name: Morenita Jensen      : 1980      MRN: [id number]  Encounter Provider: SAE Cullen  Encounter Date: 2024   Encounter department: [facility]    Assessment & Plan     1. Rash and nonspecific skin eruption  Assessment & Plan:  Culture collected  Start keflex twice daily  Follow up with dermatology on Monday    Orders:  -     cephalexin (KEFLEX) 500 mg capsule; Take 1 capsule (500 mg total) by mouth 2 (two) times a day for 7 days  -     Ambulatory Referral to Dermatology; Future  -     Culture, Aerobic Bacteria           Subjective        Rash on upper eye lid not improving    Started on mupirocin which improved but didn't fully resolve then woke up this morning with worsening redness.     Has appt with a dermatologist on Monday.   She had dry skin patch back in 24  Daughter being treated for impetigo back in March          Review of Systems   Constitutional: Negative.    HENT: Negative.     Eyes: Negative.    Respiratory: Negative.     Cardiovascular: Negative.    Skin:  Positive for rash (left eye lid).       Current Outpatient Medications on File Prior to Visit   Medication Sig   • ascorbic acid (VITAMIN C) 500 mg tablet Take by mouth   • aspirin (ECOTRIN LOW STRENGTH) 81 mg EC tablet Take 81 mg by mouth daily   • cetirizine (ZyrTEC) 10 mg tablet Take 10 mg by mouth daily   • Cholecalciferol 25 MCG (1000 UT) tablet Take 1,000 Units by mouth daily   • Cyanocobalamin (VITAMIN B 12 PO) Take by mouth   • ELDERBERRY PO Take by mouth   • erythromycin (ILOTYCIN) ophthalmic ointment Administer 0.5 inches to both eyes 4 (four) times a day   • meclizine (ANTIVERT) 25 mg tablet Take 1 tablet (25 mg total) by mouth every 8 (eight) hours as needed for dizziness   • metoprolol succinate (TOPROL-XL) 25 mg 24 hr tablet Take 25 mg by mouth daily   • MULTIPLE VITAMINS-MINERALS PO Take 1 tablet by mouth daily   • mupirocin (BACTROBAN) 2 % ointment Apply topically 2 (two) times  "a day   • sotalol (BETAPACE) 120 mg tablet Take 1 tablet by mouth 2 (two) times a day   • betamethasone, augmented, (DIPROLENE) 0.05 % ointment Apply topically 2 (two) times a day (Patient not taking: Reported on 3/24/2024)   • furosemide (LASIX) 20 mg tablet Take 1 tablet (20 mg total) by mouth daily for 7 days (Patient not taking: Reported on 3/24/2024)   • mometasone (ELOCON) 0.1 % cream Apply topically daily (Patient not taking: Reported on 3/24/2024)       Objective     /70 (BP Location: Left arm, Patient Position: Sitting, Cuff Size: Standard)   Pulse 90   Temp 97.8 °F (36.6 °C) (Tympanic)   Ht 5' 2\" (1.575 m)   Wt 63.5 kg (140 lb)   SpO2 97%   BMI 25.61 kg/m²     Physical Exam  Vitals reviewed.   Constitutional:       General: She is not in acute distress.     Appearance: Normal appearance.   HENT:      Head: Normocephalic.      Right Ear: Tympanic membrane, ear canal and external ear normal.      Left Ear: Tympanic membrane, ear canal and external ear normal.      Mouth/Throat:      Mouth: Mucous membranes are moist.      Pharynx: Oropharynx is clear.   Eyes:      General: No scleral icterus.        Right eye: No discharge.         Left eye: No discharge.      Extraocular Movements: Extraocular movements intact.      Conjunctiva/sclera: Conjunctivae normal.      Pupils: Pupils are equal, round, and reactive to light.      Comments: Left upper eye lid extending from lid to eyebrow, erythematous raised scaly patch with secondary scab   Cardiovascular:      Rate and Rhythm: Normal rate and regular rhythm.      Heart sounds: Murmur heard.   Pulmonary:      Breath sounds: Normal breath sounds.   Neurological:      Mental Status: She is alert.       SAE Cullen    "

## 2024-04-29 NOTE — TELEPHONE ENCOUNTER
Patient requesting a referral to [facility] as follows:    DOS - 05/06/24  NPI Number#[id number]  Dx:  H01.119  Procedure Code:  76102    Please submit - thank you!

## 2024-05-02 LAB
BACTERIA SPEC AEROBE CULT: NORMAL
Lab: NORMAL

## 2024-05-06 ENCOUNTER — TELEPHONE (OUTPATIENT)
Age: 44
End: 2024-05-06

## 2024-05-06 NOTE — TELEPHONE ENCOUNTER
Pt has appt this morning at [facility] and her Order states husbands name instead of hers  Appt today at 11:15. I was able to warm transfer to Misty.

## 2024-05-08 PROBLEM — R21 RASH AND NONSPECIFIC SKIN ERUPTION: Status: ACTIVE | Noted: 2024-05-08

## 2024-05-21 ENCOUNTER — OFFICE VISIT (OUTPATIENT)
Dept: OBGYN CLINIC | Facility: OTHER | Age: 44
End: 2024-05-21
Payer: COMMERCIAL

## 2024-05-21 VITALS
DIASTOLIC BLOOD PRESSURE: 82 MMHG | HEIGHT: 62 IN | BODY MASS INDEX: 25.76 KG/M2 | SYSTOLIC BLOOD PRESSURE: 104 MMHG | WEIGHT: 140 LBS

## 2024-05-21 DIAGNOSIS — M25.521 CHRONIC PAIN OF RIGHT ELBOW: ICD-10-CM

## 2024-05-21 DIAGNOSIS — G89.29 CHRONIC PAIN OF RIGHT ELBOW: ICD-10-CM

## 2024-05-21 DIAGNOSIS — M77.11 LATERAL EPICONDYLITIS OF RIGHT ELBOW: Primary | ICD-10-CM

## 2024-05-21 PROCEDURE — 76942 ECHO GUIDE FOR BIOPSY: CPT | Performed by: FAMILY MEDICINE

## 2024-05-21 PROCEDURE — 20551 NJX 1 TENDON ORIGIN/INSJ: CPT | Performed by: FAMILY MEDICINE

## 2024-05-21 RX ORDER — OXYCODONE HYDROCHLORIDE AND ACETAMINOPHEN 5; 325 MG/1; MG/1
1 TABLET ORAL EVERY 8 HOURS PRN
Qty: 20 TABLET | Refills: 0 | Status: SHIPPED | OUTPATIENT
Start: 2024-05-21 | End: 2024-05-28 | Stop reason: ALTCHOICE

## 2024-05-21 NOTE — PROGRESS NOTES
1. Lateral epicondylitis of right elbow    2. Chronic pain of right elbow      Orders Placed This Encounter   Procedures    Hand/upper extremity injection: R elbow    Brace       New Medications Ordered This Visit   Medications    oxyCODONE-acetaminophen (Percocet) 5-325 mg per tablet     Sig: Take 1 tablet by mouth every 8 (eight) hours as needed for severe pain Max Daily Amount: 3 tablets     Dispense:  20 tablet     Refill:  0      PAST REPORTS:    XR right elbow 4/22/2024  No significant degenerative changes.   No acute osseous abnormality.       ASSESSMENT/PLAN:  Chronic persistent lateral epicondylitis right elbow    PMHX:  Lateral epicondylitis left elbow -- resolved S/P PRP & Tenotomy 1/30/2024     Repeat X-ray next visit: None    Return in about 2 weeks (around 6/4/2024).    Patient instructions below verbally summarized in person during encounter:  Patient Instructions   Ultrasound-guided PRP injection and tenotomy to right elbow extensor tendon origin administered today.    Post tenotomy care and physical therapy protocol printed and given to patient    Red flags and risks of injection include but are not limited to infection <0.072% as referenced in some sources, nerve or artery penetration, and if steroids are used-skin dimpling <1%, hypo-pigmentation <1%.  Keep the injection area clean and dry for the next 24 hours. Avoid submerging underwater in a tub, pool, ocean, lake, jacuzzi, hot tub, or any other body of water for 1 week until needle wound closes due to risk of infection. May take showers. Clean needle site with soap and water and keep covered at all times with sterile bandage such as a band-aid until fully healed.  If any symptoms including fevers, chills, swelling, or worsening symptoms occur then to call office or go to hospital for immediate care if physician unavailable due to possible infection or other complication which is a serious medical problem.  May resume regular activities as  tolerated and use local ice application or over-the-counter pain medications if there is any discomfort.      I advised avoiding anti-inflammatory use to prevent inhibiition of PRP healing process. As an alternative, you may use tylenol (acetaminophen) for pain or provided percocet prescription for severe pain.     Educated risks of drowsiness and sedating effects when taking prescription opiate pain pills including ultram (tramadol), vicodin (hydrocodone-acetaminophen) and percocet (oxycodone-acetaminophen) and recommended against driving or performing complex tasks while taking these medications due to risk of injury to self or others    educated risks of mixing sedating medications such as sleeping pills, benadryl (diphenhydramine), prescription opiates, muscle relaxants, alcohol, benzodiazepines such as xanax (alprazolam), ativan (lorazepam), clonazeppam, valium (diazepam) and other sedating medications including respiratory depression (stopping breathing) and death. instructed to never take these medications together.     I also explained that vicodin (hydrocodone-acetaminophen) and percocet (oxycodone-acetaminophen) is a combination pill and contains tylenol (acetaminophen). I recommend against takign tylenol in the setting of liver problems (hepatitis or other) and if does take tylenol then takeno more than a  maximum of 1,000  Mg per dose every 6 hours as needed for pain but no more 3 doses or 3,000 mg per day. If you are taking other medications which include tylenol (acetaminophen) such as hydrocodone-acetaminophen then you must factor in the amount of tylenol (acetamionphen) into your 3,000mg maximum dose.     Patient expressed understanding and agreed to plan.         PRP stands for Platelet Rich Plasma and is a Injectate solution prepared from a patient's own blood that has a high concentration of platelets. Blood is drawn from the patient in the same way that occurs during a routine blood draw. A  special machine then extracts platelet growth factors and other natural chemicals from the patient's own blood that are hypothesized to help healing tissue. PRP has been used to help healing tissues since approximately 1999, and specifically, for arthritis and cartilage problems since 2003.     Injection of PRP is recommended under ultrasound to attain visuzlization of needle and injectate into target site. Once injected, PRP begins to clot and within 10 minutes of clotting, the platelets begin to secrete their PDGFs (Platelet derived growth factors) which help to aid healing. Lab studies in petri dishes have shown that these healing factors aid in growth of tissue. There have been a number of studies to show evidence that PRP is better in humans when injected compared to placebo, but there is no definitive evidence. Specifically, PRP injection does not result in relief consistently 100% of the time and is considered experimental. As such, PRP should not be viewed as curative but as a treatment modality that may offer relief.         __________________________________________________________________________    HISTORY OF PRESENT ILLNESS:    43 y.o. female presents for 1 month follow-up of chronic persistent lateral epicondylitis right elbow.  Last visit on 4/22/2024 patient was recommended to follow-up for USG PRP injection and tenotomy of extensor tendon origin right elbow.    1/30/2024 USG PRP & Tenotomy left elbow with great results    5/21/2024:  Today patient reports persistent lateral right elbow pain. Has been adherence to treatment recommendation including home exercises. Denies any new injury since last visit.    Following history reviewed and updated:  Historical Information   Patient Active Problem List    Diagnosis Date Noted Date Diagnosed    Chronic pain of right elbow 05/21/2024     Rash and nonspecific skin eruption 05/08/2024     Heartburn 04/12/2024     Hematuria 04/12/2024     Irregular menses  04/12/2024     Vision changes 03/03/2024     Rib pain on right side 02/13/2024     Hemoptysis 12/25/2023     PVCs (premature ventricular contractions) 12/25/2023     Elevated serum hCG 12/25/2023     Lateral epicondylitis, left elbow 10/03/2023     LLQ pain 06/25/2023     Amenorrhea 06/25/2023     Early satiety 06/25/2023     Numbness and tingling      Left otitis media with effusion 05/17/2023     Intrinsic eczema 04/12/2023     Encounter for screening mammogram for breast cancer 04/12/2023     Carpal tunnel syndrome on right 01/28/2023     Lateral epicondylitis of right elbow 01/28/2023     Chronic right shoulder pain 01/28/2023     Vertigo 12/04/2022     Mass of upper outer quadrant of right breast 04/10/2022     Plantar fasciitis, bilateral 04/10/2022     Cardiomyopathy, unspecified type (HCC) 04/05/2022     Arthralgia of right ankle 06/01/2021     Family history of rectal cancer 11/02/2020     Seasonal allergies 05/11/2020     BRCA gene mutation negative in female 11/04/2019     Pelvic pain 01/08/2019     Other constipation 01/08/2019     Family history of ovarian cancer 10/30/2018     Congenital heart disease 09/04/2018     Pacemaker 09/04/2018     Anxiety 06/04/2018     Palpitations 08/08/2017     Chronic sinusitis 06/26/2017     New onset headache 06/24/2017     Muscle pain 06/20/2017     Benign paroxysmal positional vertigo of right ear 03/23/2017     Neuralgia 12/13/2016     S/P Mustard operation 01/29/2015     RBBB 03/16/2014     Gilbert's syndrome 06/19/2012     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     Ventricular tachycardia, nonsustained (HCC) 04/20/2011     Other specified cardiac dysrhythmias(427.89) 10/09/2008     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     Esophageal reflux 10/09/2008     Sinoatrial node dysfunction (HCC) 10/09/2008     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/2008      Past Medical History:   Diagnosis Date    Acute respiratory failure with hypoxia (HCC)     Atrial fibrillation (HCC)     last assessed 03/14/2014    BRCA1 negative     BRCA2 negative     Complete transposition of great vessels     last assessed 02/14/2014    Eczema     GERD (gastroesophageal reflux disease)     History of cardiac cath     Cath RVOT subpulmonary obstruction, last assessed 06/19/2012    Hyperbilirubinemia (congenital)     Migraine     SSS (sick sinus syndrome) (HCC)     Torsion of small intestine (HCC)      Past Surgical History:   Procedure Laterality Date    ADENOIDECTOMY      onset childhood    ATRIAL SEPTECTOMY      balloon transvenous method    CARDIAC CATHETERIZATION  02/20/2024    CARDIAC PACEMAKER PLACEMENT      last assessed 03/14/2014    LAPAROSCOPIC LYSIS INTESTINAL ADHESIONS      OOPHORECTOMY      OTHER SURGICAL HISTORY      transposition repair mustard procedure, description 11 months    TONSILLECTOMY      TUBAL LIGATION      last assessed 08/17/2012    US GUIDED BREAST BIOPSY RIGHT COMPLETE Right 11/18/2019     Current Outpatient Medications on File Prior to Visit   Medication Sig    ascorbic acid (VITAMIN C) 500 mg tablet Take by mouth    aspirin (ECOTRIN LOW STRENGTH) 81 mg EC tablet Take 81 mg by mouth daily    cetirizine (ZyrTEC) 10 mg tablet Take 10 mg by mouth daily    Cholecalciferol 25 MCG (1000 UT) tablet Take 1,000 Units by mouth daily    Cyanocobalamin (VITAMIN B 12 PO) Take by mouth    ELDERBERRY PO Take by mouth    erythromycin (ILOTYCIN) ophthalmic ointment Administer 0.5 inches to both eyes 4 (four) times a day    meclizine (ANTIVERT) 25 mg tablet Take 1 tablet (25 mg total) by mouth every 8 (eight) hours as needed for dizziness    metoprolol succinate (TOPROL-XL) 25 mg 24 hr tablet Take 25 mg by mouth daily    MULTIPLE VITAMINS-MINERALS PO Take 1 tablet by mouth daily    mupirocin (BACTROBAN) 2 % ointment Apply topically 2 (two) times a day    sotalol  (BETAPACE) 120 mg tablet Take 1 tablet by mouth 2 (two) times a day    betamethasone, augmented, (DIPROLENE) 0.05 % ointment Apply topically 2 (two) times a day (Patient not taking: Reported on 3/24/2024)    furosemide (LASIX) 20 mg tablet Take 1 tablet (20 mg total) by mouth daily for 7 days (Patient not taking: Reported on 3/24/2024)    mometasone (ELOCON) 0.1 % cream Apply topically daily (Patient not taking: Reported on 3/24/2024)     Allergies   Allergen Reactions    Enalapril      Reaction Date: 14Apr2011;   Pt denies 2/2018  Patient stated it may interact with sotalol.    Other      Other reaction(s): Other  Tongue feels funny    Penicillins Hives     Category: Allergy;      Social History     Socioeconomic History    Marital status: /Civil Union     Spouse name: Not on file    Number of children: Not on file    Years of education: Not on file    Highest education level: Not on file   Occupational History    Not on file   Tobacco Use    Smoking status: Never    Smokeless tobacco: Never   Vaping Use    Vaping status: Never Used   Substance and Sexual Activity    Alcohol use: Yes     Comment: social    Drug use: No    Sexual activity: Not on file   Other Topics Concern    Not on file   Social History Narrative    Not on file     Social Determinants of Health     Financial Resource Strain: Not on file   Food Insecurity: Not on file   Transportation Needs: Not on file   Physical Activity: Not on file   Stress: Not on file   Social Connections: Not on file   Intimate Partner Violence: Not on file   Housing Stability: Not on file     Family History   Problem Relation Age of Onset    Cancer Father 63        hodgkins lymphoma     Melanoma Father     Colon cancer Sister 22        carcinoid tumor in the rectum    Heart disease Maternal Grandmother     Hyperlipidemia Maternal Grandmother     Diabetes Paternal Grandmother     Hypertension Paternal Grandmother     Lung cancer Paternal Grandmother     Heart disease  "Paternal Grandfather     Hyperlipidemia Paternal Grandfather     Lung cancer Paternal Grandfather     No Known Problems Mother     No Known Problems Daughter     Cancer Maternal Grandfather         tongue    Breast cancer Paternal Aunt 52    Ovarian cancer Paternal Aunt 30    No Known Problems Paternal Aunt     No Known Problems Maternal Aunt         /82 (BP Location: Left arm, Patient Position: Sitting, Cuff Size: Standard)   Ht 5' 2\" (1.575 m)   Wt 63.5 kg (140 lb)   BMI 25.61 kg/m²     PHYSICAL EXAM:    Right Elbow Examination:    Appearance: no swelling, erythema, ecchymosis, or increased warmth noted  Active ROM against resistance without pain  Flexion: 140°  Extension: 0°  Pronation: 80°  Supination: 80°  Tenderness:  Medial epicondyle: Non-tender  Lateral epicondyle: + Tender, reproduces chief complaint  Olecranon: Non-tender  Radial head: Non-tender  Distal biceps tendon: Non-tender  Flexion: 5/5  Extension: 5/5  Pronation: 5/5  Supination: 5/5  Resisted wrist extension: + Pain, reproduces chief complaint  Resisted 3rd finger extension: + Pain, reproduces chief complaint  Resisted wrist flexion: No pain or weakness  Cubital tunnel Tinel's: Negative  Radial/median/ulnar nerve dermatomal sensation intact  Radial pulse 2+    __________________________________________________________________________  Hand/upper extremity injection: R elbow  Universal Protocol:  Procedure performed by: (Performed by Dr Hay, Supervised by Dr Gutiérrez)  Consent: Verbal consent obtained.  Risks and benefits: risks, benefits and alternatives were discussed  Consent given by: patient  Patient understanding: patient states understanding of the procedure being performed  Patient consent: the patient's understanding of the procedure matches consent given  Site marked: the operative site was marked  Radiology Images displayed and confirmed. If images not available, report reviewed: imaging studies available  Required items: " required blood products, implants, devices, and special equipment available  Patient identity confirmed: verbally with patient  Supporting Documentation  Indications: pain   Procedure Details  Condition:lateral epicondylitis Site: R elbow   Preparation: Patient was prepped and draped in the usual sterile fashion (Alcohol, betadine, chlorhexidine)  Needle size: 22 G  Ultrasound guidance: yes  Patient tolerance: patient tolerated the procedure well with no immediate complications  Dressing:  Sterile dressing applied    5mL PRP injectate  5 passes with 22g needle made in each 1/3rd of tendon advancing bevel to bone for tenotomy  Injectate delievered intratendinous as well as layered superficial to extensor tendon

## 2024-05-21 NOTE — PATIENT INSTRUCTIONS
Ultrasound-guided PRP injection and tenotomy to right elbow extensor tendon origin administered today.    Post tenotomy care and physical therapy protocol printed and given to patient    Red flags and risks of injection include but are not limited to infection <0.072% as referenced in some sources, nerve or artery penetration, and if steroids are used-skin dimpling <1%, hypo-pigmentation <1%.  Keep the injection area clean and dry for the next 24 hours. Avoid submerging underwater in a tub, pool, ocean, lake, jacuzzi, hot tub, or any other body of water for 1 week until needle wound closes due to risk of infection. May take showers. Clean needle site with soap and water and keep covered at all times with sterile bandage such as a band-aid until fully healed.  If any symptoms including fevers, chills, swelling, or worsening symptoms occur then to call office or go to hospital for immediate care if physician unavailable due to possible infection or other complication which is a serious medical problem.  May resume regular activities as tolerated and use local ice application or over-the-counter pain medications if there is any discomfort.      I advised avoiding anti-inflammatory use to prevent inhibiition of PRP healing process. As an alternative, you may use tylenol (acetaminophen) for pain or provided percocet prescription for severe pain.     Educated risks of drowsiness and sedating effects when taking prescription opiate pain pills including ultram (tramadol), vicodin (hydrocodone-acetaminophen) and percocet (oxycodone-acetaminophen) and recommended against driving or performing complex tasks while taking these medications due to risk of injury to self or others    educated risks of mixing sedating medications such as sleeping pills, benadryl (diphenhydramine), prescription opiates, muscle relaxants, alcohol, benzodiazepines such as xanax (alprazolam), ativan (lorazepam), clonazeppam, valium (diazepam) and other  sedating medications including respiratory depression (stopping breathing) and death. instructed to never take these medications together.     I also explained that vicodin (hydrocodone-acetaminophen) and percocet (oxycodone-acetaminophen) is a combination pill and contains tylenol (acetaminophen). I recommend against takign tylenol in the setting of liver problems (hepatitis or other) and if does take tylenol then takeno more than a  maximum of 1,000  Mg per dose every 6 hours as needed for pain but no more 3 doses or 3,000 mg per day. If you are taking other medications which include tylenol (acetaminophen) such as hydrocodone-acetaminophen then you must factor in the amount of tylenol (acetamionphen) into your 3,000mg maximum dose.     Patient expressed understanding and agreed to plan.         PRP stands for Platelet Rich Plasma and is a Injectate solution prepared from a patient's own blood that has a high concentration of platelets. Blood is drawn from the patient in the same way that occurs during a routine blood draw. A special machine then extracts platelet growth factors and other natural chemicals from the patient's own blood that are hypothesized to help healing tissue. PRP has been used to help healing tissues since approximately 1999, and specifically, for arthritis and cartilage problems since 2003.     Injection of PRP is recommended under ultrasound to attain visuzlization of needle and injectate into target site. Once injected, PRP begins to clot and within 10 minutes of clotting, the platelets begin to secrete their PDGFs (Platelet derived growth factors) which help to aid healing. Lab studies in petri dishes have shown that these healing factors aid in growth of tissue. There have been a number of studies to show evidence that PRP is better in humans when injected compared to placebo, but there is no definitive evidence. Specifically, PRP injection does not result in relief consistently 100% of  the time and is considered experimental. As such, PRP should not be viewed as curative but as a treatment modality that may offer relief.

## 2024-05-28 ENCOUNTER — OFFICE VISIT (OUTPATIENT)
Dept: FAMILY MEDICINE CLINIC | Facility: CLINIC | Age: 44
End: 2024-05-28
Payer: COMMERCIAL

## 2024-05-28 VITALS
HEART RATE: 75 BPM | DIASTOLIC BLOOD PRESSURE: 66 MMHG | SYSTOLIC BLOOD PRESSURE: 104 MMHG | BODY MASS INDEX: 26.31 KG/M2 | WEIGHT: 143 LBS | OXYGEN SATURATION: 97 % | TEMPERATURE: 97.1 F | HEIGHT: 62 IN

## 2024-05-28 DIAGNOSIS — M70.51 PATELLAR BURSITIS OF RIGHT KNEE: ICD-10-CM

## 2024-05-28 DIAGNOSIS — Z00.00 WELL ADULT EXAM: Primary | ICD-10-CM

## 2024-05-28 DIAGNOSIS — Z23 ENCOUNTER FOR IMMUNIZATION: ICD-10-CM

## 2024-05-28 PROCEDURE — 99396 PREV VISIT EST AGE 40-64: CPT | Performed by: FAMILY MEDICINE

## 2024-05-28 PROCEDURE — 90471 IMMUNIZATION ADMIN: CPT

## 2024-05-28 PROCEDURE — 90715 TDAP VACCINE 7 YRS/> IM: CPT

## 2024-05-28 NOTE — PROGRESS NOTES
ADULT ANNUAL PHYSICAL  [facility]    NAME: Morenita Jensen  AGE: 43 y.o. SEX: female  : 1980     DATE: 2024     Assessment and Plan:     1. Well adult exam  2. Patellar bursitis of right knee  Assessment & Plan:  Cool compresses. Avoid aggravating activities especially direct pressure.   3. Encounter for immunization  -     TDAP VACCINE GREATER THAN OR EQUAL TO 8YO IM      Immunizations and preventive care screenings were discussed with patient today. Appropriate education was printed on patient's after visit summary.    Counseling:  Dental Health: discussed importance of regular tooth brushing, flossing, and dental visits.  Exercise: the importance of regular exercise/physical activity was discussed. Recommend exercise 3-5 times per week for at least 30 minutes.       Depression Screening and Follow-up Plan: Patient was screened for depression during today's encounter. They screened negative with a PHQ-2 score of 0.        No follow-ups on file.     Chief Complaint:     Chief Complaint   Patient presents with    Physical Exam     PAP- Has OB appointment in December.     Knee Pain     Patient has been getting pain and tightening on her right knee. Began 1 week ago.      History of Present Illness:     Pt is here for a physical today.   Complains of right knee, last week, no injury.   2023, pap - done at [facility]    Knee Pain           Review of Systems:     Review of Systems   Constitutional: Negative.  Negative for fatigue and fever.   HENT: Negative.     Eyes: Negative.    Respiratory: Negative.  Negative for cough.    Cardiovascular: Negative.    Gastrointestinal: Negative.    Endocrine: Negative.    Genitourinary: Negative.    Musculoskeletal:  Positive for arthralgias and gait problem.        Right knee pain   Skin: Negative.    Allergic/Immunologic: Negative.    Psychiatric/Behavioral: Negative.        Past Medical History:     Past  Medical History:   Diagnosis Date    Acute respiratory failure with hypoxia (HCC)     Atrial fibrillation (HCC)     last assessed 03/14/2014    BRCA1 negative     BRCA2 negative     Complete transposition of great vessels     last assessed 02/14/2014    Eczema     GERD (gastroesophageal reflux disease)     History of cardiac cath     Cath RVOT subpulmonary obstruction, last assessed 06/19/2012    Hyperbilirubinemia (congenital)     Migraine     SSS (sick sinus syndrome) (HCC)     Torsion of small intestine (HCC)       Past Surgical History:     Past Surgical History:   Procedure Laterality Date    ADENOIDECTOMY      onset childhood    ATRIAL SEPTECTOMY      balloon transvenous method    CARDIAC CATHETERIZATION  02/20/2024    CARDIAC PACEMAKER PLACEMENT      last assessed 03/14/2014    LAPAROSCOPIC LYSIS INTESTINAL ADHESIONS      OOPHORECTOMY      OTHER SURGICAL HISTORY      transposition repair mustard procedure, description 11 months    TONSILLECTOMY      TUBAL LIGATION      last assessed 08/17/2012    US GUIDED BREAST BIOPSY RIGHT COMPLETE Right 11/18/2019      Social History:     Social History     Socioeconomic History    Marital status: /Civil Union     Spouse name: None    Number of children: None    Years of education: None    Highest education level: None   Occupational History    None   Tobacco Use    Smoking status: Never    Smokeless tobacco: Never   Vaping Use    Vaping status: Never Used   Substance and Sexual Activity    Alcohol use: Yes     Comment: social    Drug use: No    Sexual activity: None   Other Topics Concern    None   Social History Narrative    None     Social Determinants of Health     Financial Resource Strain: Not on file   Food Insecurity: Not on file   Transportation Needs: Not on file   Physical Activity: Not on file   Stress: Not on file   Social Connections: Not on file   Intimate Partner Violence: Not on file   Housing Stability: Not on file      Family History:     Family  History   Problem Relation Age of Onset    Cancer Father 63        hodgkins lymphoma     Melanoma Father     Colon cancer Sister 22        carcinoid tumor in the rectum    Heart disease Maternal Grandmother     Hyperlipidemia Maternal Grandmother     Diabetes Paternal Grandmother     Hypertension Paternal Grandmother     Lung cancer Paternal Grandmother     Heart disease Paternal Grandfather     Hyperlipidemia Paternal Grandfather     Lung cancer Paternal Grandfather     No Known Problems Mother     No Known Problems Daughter     Cancer Maternal Grandfather         tongue    Breast cancer Paternal Aunt 52    Ovarian cancer Paternal Aunt 30    No Known Problems Paternal Aunt     No Known Problems Maternal Aunt       Current Medications:     Current Outpatient Medications   Medication Sig Dispense Refill    hydrocortisone 2.5 % cream Apply 1 Application topically 2 (two) times a day To left upper eyelid for 2 weeks max      ascorbic acid (VITAMIN C) 500 mg tablet Take by mouth      aspirin (ECOTRIN LOW STRENGTH) 81 mg EC tablet Take 81 mg by mouth daily      cetirizine (ZyrTEC) 10 mg tablet Take 10 mg by mouth daily      Cholecalciferol 25 MCG (1000 UT) tablet Take 1,000 Units by mouth daily      Cyanocobalamin (VITAMIN B 12 PO) Take by mouth      ELDERBERRY PO Take by mouth      meclizine (ANTIVERT) 25 mg tablet Take 1 tablet (25 mg total) by mouth every 8 (eight) hours as needed for dizziness 30 tablet 2    metoprolol succinate (TOPROL-XL) 25 mg 24 hr tablet Take 25 mg by mouth daily      MULTIPLE VITAMINS-MINERALS PO Take 1 tablet by mouth daily      sotalol (BETAPACE) 120 mg tablet Take 1 tablet by mouth 2 (two) times a day       No current facility-administered medications for this visit.      Allergies:     Allergies   Allergen Reactions    Enalapril      Reaction Date: 14Apr2011;   Pt denies 2/2018  Patient stated it may interact with sotalol.    Other      Other reaction(s): Other  Tongue feels funny     "Penicillins Hives     Category: Allergy;       Physical Exam:     /66 (BP Location: Left arm, Patient Position: Sitting, Cuff Size: Standard)   Pulse 75   Temp (!) 97.1 °F (36.2 °C) (Tympanic)   Ht 5' 2\" (1.575 m)   Wt 64.9 kg (143 lb)   SpO2 97%   BMI 26.16 kg/m²     Physical Exam  Vitals and nursing note reviewed.   Constitutional:       Appearance: She is well-developed.   HENT:      Head: Normocephalic and atraumatic.      Right Ear: External ear normal.      Left Ear: External ear normal.      Nose: Nose normal.   Eyes:      Conjunctiva/sclera: Conjunctivae normal.      Pupils: Pupils are equal, round, and reactive to light.   Cardiovascular:      Rate and Rhythm: Normal rate and regular rhythm.      Heart sounds: Normal heart sounds.   Pulmonary:      Effort: Pulmonary effort is normal.      Breath sounds: Normal breath sounds.   Abdominal:      General: Bowel sounds are normal.      Palpations: Abdomen is soft.   Musculoskeletal:         General: Swelling and tenderness present. Normal range of motion.      Cervical back: Normal range of motion and neck supple.      Comments: Right knee tenderness- ligaments intact, mild edema   Skin:     General: Skin is warm and dry.   Neurological:      Mental Status: She is alert and oriented to person, place, and time.   Psychiatric:         Behavior: Behavior normal.         Thought Content: Thought content normal.         Judgment: Judgment normal.          Genet Lopez DO  [facility]  "

## 2024-05-28 NOTE — PATIENT INSTRUCTIONS
Knee Bursitis   AMBULATORY CARE:   Knee bursitis  is inflammation of the bursa in your knee. The bursa is a fluid-filled sac that acts as a cushion between a bone and a tendon. A tendon is a cord of strong tissue that connects muscles to bones.       Common signs and symptoms of knee bursitis:   Pain, swelling, or tenderness in your knee    Decreased movement or stiffness of your knee    Red, warm, skin over your knee    A grating or grinding sound or feeling when you move your knee    Call your doctor if:   Your pain and swelling increase.    Your symptoms do not improve with treatment.    You have a fever.    You have questions or concerns about your condition or care.    Treatment  may include any of the following:  Medicines:      NSAIDs , such as ibuprofen, help decrease swelling, pain, and fever. NSAIDs can cause stomach bleeding or kidney problems in certain people. If you take blood thinner medicine, always ask your healthcare provider if NSAIDs are safe for you. Always read the medicine label and follow directions.    Aspirin  helps relieve pain and swelling. Take aspirin exactly as directed by your healthcare provider.    Antibiotics  help fight an infection caused by bacteria.    Steroids  help relieve pain and swelling. Steroid injections are given directly into the painful area. Steroid pills may be given for a short time.    Surgery  may be used to remove your bursa. Surgery is only done when other treatments do not work.    Manage your symptoms:   Rest your knee as much as possible to decrease pain and swelling.  Slowly start to do more each day. Return to your daily activities as directed.    Apply ice to help decrease swelling and pain.  Ice may also help prevent tissue damage. Use an ice pack, or put crushed ice in a plastic bag. Cover it with a towel and place it on your knee for 15 to 20 minutes, 3 to 4 times each day, as directed.    Apply heat to help decrease pain and stiffness.  Apply heat on  the area for 15 to 20 minutes, 3 to 4 times each day, as directed.    Apply compression to decrease swelling.  Healthcare providers may wrap your knee with tape or an elastic bandage to decrease swelling. Loosen the elastic bandage if you start to lose feeling in your toes.         Elevate  your knee above the level of your heart as often as you can. This will help decrease swelling and pain. Prop your lower leg on pillows or blankets to keep it elevated comfortably. Do not put the pillow directly under your knee.         Go to physical therapy, if directed.  A physical therapist can teach you exercises to improve your range of motion and increase knee strength.    Prevent knee bursitis:   Stretch, warm up, and cool down when you exercise.  This will help loosen your muscles and decrease stress on your knees. Rest between workouts.    Protect your knees.  Use kneepads when you kneel on a hard surface and when you play sports. Stand and walk around every 20 minutes if you have to kneel for a long period of time.    Follow up with your doctor as directed:  Write down your questions so you remember to ask them during your visits.  © Copyright [employer] 2023 Information is for End User's use only and may not be sold, redistributed or otherwise used for commercial purposes.  The above information is an  only. It is not intended as medical advice for individual conditions or treatments. Talk to your doctor, nurse or pharmacist before following any medical regimen to see if it is safe and effective for you.  Cholesterol and Your Health   AMBULATORY CARE:   Cholesterol  is a waxy, fat-like substance. Your body uses cholesterol to make hormones and new cells, and to protect nerves. Cholesterol is made by your body. It also comes from certain foods you eat, such as meat and dairy products. Your healthcare provider can help you set goals for your cholesterol levels. Your provider can help you create a plan to  meet your goals.  Cholesterol level goals:  Your cholesterol level goals depend on your risk for heart disease, your age, and your other health conditions. The following are general guidelines:  Total cholesterol  includes low-density lipoprotein (LDL), high-density lipoprotein (HDL), and triglyceride levels. The total cholesterol level should be lower than 200 mg/dL and is best at about 150 mg/dL.    LDL cholesterol  is called bad cholesterol  because it forms plaque in your arteries. As plaque builds up, your arteries become narrow, and less blood flows through. When plaque decreases blood flow to your heart, you may have chest pain. If plaque completely blocks an artery that brings blood to your heart, you may have a heart attack. Plaque can break off and form blood clots. Blood clots may block arteries in your brain and cause a stroke. The level should be less than 130 mg/dL and is best at about 100 mg/dL.         HDL cholesterol  is called good cholesterol  because it helps remove LDL cholesterol from your arteries. It does this by attaching to LDL cholesterol and carrying it to your liver. Your liver breaks down LDL cholesterol so your body can get rid of it. High levels of HDL cholesterol can help prevent a heart attack and stroke. Low levels of HDL cholesterol can increase your risk for heart disease, heart attack, and stroke. The level should be at least 40 mg/dL in males or at least 50 mg/dL in females.    Triglycerides  are a type of fat that store energy from foods you eat. High levels of triglycerides also cause plaque buildup. This can increase your risk for a heart attack or stroke. If your triglyceride level is high, your LDL cholesterol level may also be high. The level should be less than 150 mg/dL.    Any of the following can increase your risk for high cholesterol:   Smoking or drinking large amounts of alcohol    Having overweight or obesity, or not getting enough exercise    A medical condition  such as hypertension (high blood pressure) or diabetes    A family history of high cholesterol    Age older than 65    What you need to know about having your cholesterol levels checked:  Adults 20 to 45 years of age should have their cholesterol levels checked every 4 to 6 years. Adults 45 years or older should have their cholesterol checked every 1 to 2 years. You may need your cholesterol checked more often, or at a younger age, if you have risk factors for heart disease. You may also need to have your cholesterol checked more often if you have other health conditions, such as diabetes. Blood tests are used to check cholesterol levels. Blood tests measure your levels of triglycerides, LDL cholesterol, and HDL cholesterol.  How healthy fats affect your cholesterol levels:  Healthy fats, also called unsaturated fats, help lower LDL cholesterol and triglyceride levels. Healthy fats include the following:  Monounsaturated fats  are found in foods such as olive oil, canola oil, avocado, nuts, and olives.    Polyunsaturated fats,  such as omega 3 fats, are found in fish, such as salmon, trout, and tuna. They can also be found in plant foods such as flaxseed, walnuts, and soybeans.    How unhealthy fats affect your cholesterol levels:  Unhealthy fats increase LDL cholesterol and triglyceride levels. They are found in foods high in cholesterol, saturated fat, and trans fat:  Cholesterol  is found in eggs, dairy, and meat.    Saturated fat  is found in butter, cheese, ice cream, whole milk, and coconut oil. Saturated fat is also found in meat, such as sausage, hot dogs, and bologna.    Trans fat  is found in liquid oils and is used in fried and baked foods. Foods that contain trans fats include chips, crackers, muffins, sweet rolls, microwave popcorn, and cookies.    Treatment  for high cholesterol will also decrease your risk of heart disease, heart attack, and stroke. Treatment may include any of the  following:  Lifestyle changes  may include food, exercise, weight loss, and quitting smoking. You may also need to decrease the amount of alcohol you drink. Your healthcare provider will want you to start with lifestyle changes. Other treatment may be added if lifestyle changes are not enough. Your healthcare provider may recommend you work with a team to manage hyperlipidemia. The team may include medical experts such as a dietitian, an exercise or physical therapist, and a behavior therapist. Your family members may be included in helping you create lifestyle changes.    Medicines  may be given to lower your LDL cholesterol, triglyceride levels, or total cholesterol level. You may need medicines to lower your cholesterol if any of the following is true:    You have a history of stroke, TIA, unstable angina, or a heart attack.    Your LDL cholesterol level is 190 mg/dL or higher.    You are age 40 to 75 years, have diabetes or heart disease risk factors, and your LDL cholesterol is 70 mg/dL or higher.    Supplements  include fish oil, red yeast rice, and garlic. Fish oil may help lower your triglyceride and LDL cholesterol levels. It may also increase your HDL cholesterol level. Red yeast rice may help decrease your total cholesterol level and LDL cholesterol level. Garlic may help lower your total cholesterol level. Do not take any supplements without talking to your healthcare provider.    Food changes you can make to lower your cholesterol levels:  A dietitian can help you create a healthy eating plan. Your dietitian can show you how to read food labels and choose foods low in saturated fat, trans fats, and cholesterol.     Decrease the total amount of fat you eat.  Choose lean meats, fat-free or 1% fat milk, and low-fat dairy products, such as yogurt and cheese. Try to limit or avoid red meats. Limit or do not eat fried foods or baked goods, such as cookies.    Replace unhealthy fats with healthy fats.  Cook  foods in olive oil or canola oil. Choose soft margarines that are low in saturated fat and trans fat. Seeds, nuts, and avocados are other examples of healthy fats.    Eat foods with omega-3 fats.  Examples include salmon, tuna, mackerel, walnuts, and flaxseed. Eat fish 2 times per week. Pregnant women should not eat fish that have high levels of mercury, such as shark, swordfish, and jaleesa mackerel.         Increase the amount of high-fiber foods you eat.  High-fiber foods can help lower your LDL cholesterol. Aim to get between 20 and 30 grams of fiber each day. Fruits and vegetables are high in fiber. Eat at least 5 servings each day. Other high-fiber foods are whole-grain or whole-wheat breads, pastas, or cereals, and brown rice. Eat 3 ounces of whole-grain foods each day. Increase fiber slowly. You may have abdominal discomfort, bloating, and gas if you add fiber to your diet too quickly.         Eat healthy protein foods.  Examples include low-fat dairy products, skinless chicken and turkey, fish, and nuts.    Limit foods and drinks that are high in sugar.  Your dietitian or healthcare provider can help you create daily limits for high-sugar foods and drinks. The limit may be lower if you have diabetes or another health condition. Limits can also help you lose weight if needed.  Lifestyle changes you can make to lower your cholesterol levels:   Maintain a healthy weight.  Ask your healthcare provider what a healthy weight is for you. Ask your provider to help you create a weight loss plan if needed. Weight loss can decrease your total cholesterol and triglyceride levels. Weight loss may also help keep your blood pressure at a healthy level.    Be physically active throughout the day.  Physical activity, such as exercise, can help lower your total cholesterol level and maintain a healthy weight. Physical activity can also help increase your HDL cholesterol level. Work with your healthcare provider to create an  program that is right for you. Get at least 30 to 40 minutes of moderate physical activity most days of the week. Examples of exercise include brisk walking, swimming, or biking. Also include strength training at least 2 times each week. Your healthcare providers can help you create a physical activity plan.            Do not smoke.  Nicotine and other chemicals in cigarettes and cigars can raise your cholesterol levels. Ask your healthcare provider for information if you currently smoke and need help to quit. E-cigarettes or smokeless tobacco still contain nicotine. Talk to your healthcare provider before you use these products.         Limit or do not drink alcohol.  Alcohol can increase your triglyceride levels. Ask your healthcare provider before you drink alcohol. Ask how much is okay for you to drink in 24 hours or 1 week.    Follow up with your doctor as directed:  Write down your questions so you remember to ask them during your visits.  © Copyright [employer] 2023 Information is for End User's use only and may not be sold, redistributed or otherwise used for commercial purposes.  The above information is an  only. It is not intended as medical advice for individual conditions or treatments. Talk to your doctor, nurse or pharmacist before following any medical regimen to see if it is safe and effective for you.

## 2024-05-29 ENCOUNTER — TELEPHONE (OUTPATIENT)
Dept: GASTROENTEROLOGY | Facility: CLINIC | Age: 44
End: 2024-05-29

## 2024-05-29 NOTE — TELEPHONE ENCOUNTER
Spoke to Ruth at [facility], she has emailed Dr. Frankel and Dr. Deshpande to get clearance letter faxed to us. I am also refaxing the clearance form for Pacemaker again to the two fax numbers she provided. Gave phone number to talk to me if they have any questions.

## 2024-06-01 NOTE — PROGRESS NOTES
1. Lateral epicondylitis, right elbow        2. Chronic pain of right elbow          No orders of the defined types were placed in this encounter.       IMAGING STUDIES: (I personally reviewed images in PACS and report):         PAST REPORTS:        Past procedures:       ASSESSMENT/PLAN:    Right elbow lateral tendinitis  Status post right elbow lateral epicondyle PRP injection on 5/21/2024    Repeat X-ray next visit: None    Return in about 4 weeks (around 7/2/2024).    Patient Instructions   PT protocol after PRP injection was given to the patient in the office.   Please start PT at 2 weeks marjan now.   Follow up in 4 weeks.      __________________________________________________________________________    HISTORY OF PRESENT ILLNESS:      She is here for the follow-up after PRP injection of the right elbow in the setting of right elbow lateral epicondylitis. USG 2 weeks ago. Reported the pain is worse with lifting. No fever chills. So far took 1.5 percocet/ three times. Pain is 10/10 at its worst and still wake her up at night.         Review of Systems  Review of Systems     Following history reviewed and update:    Past Medical History:   Diagnosis Date    Acute respiratory failure with hypoxia (HCC)     Atrial fibrillation (HCC)     last assessed 03/14/2014    BRCA1 negative     BRCA2 negative     Complete transposition of great vessels     last assessed 02/14/2014    Eczema     GERD (gastroesophageal reflux disease)     History of cardiac cath     Cath RVOT subpulmonary obstruction, last assessed 06/19/2012    Hyperbilirubinemia (congenital)     Migraine     SSS (sick sinus syndrome) (HCC)     Torsion of small intestine (HCC)      Past Surgical History:   Procedure Laterality Date    ADENOIDECTOMY      onset childhood    ATRIAL SEPTECTOMY      balloon transvenous method    CARDIAC CATHETERIZATION  02/20/2024    CARDIAC PACEMAKER PLACEMENT      last assessed 03/14/2014    LAPAROSCOPIC LYSIS INTESTINAL ADHESIONS  "     OOPHORECTOMY      OTHER SURGICAL HISTORY      transposition repair mustard procedure, description 11 months    TONSILLECTOMY      TUBAL LIGATION      last assessed 08/17/2012    US GUIDED BREAST BIOPSY RIGHT COMPLETE Right 11/18/2019     Social History   Social History     Substance and Sexual Activity   Alcohol Use Yes    Comment: social     Social History     Substance and Sexual Activity   Drug Use No     Social History     Tobacco Use   Smoking Status Never   Smokeless Tobacco Never     Family History   Problem Relation Age of Onset    Cancer Father 63        hodgkins lymphoma     Melanoma Father     Colon cancer Sister 22        carcinoid tumor in the rectum    Heart disease Maternal Grandmother     Hyperlipidemia Maternal Grandmother     Diabetes Paternal Grandmother     Hypertension Paternal Grandmother     Lung cancer Paternal Grandmother     Heart disease Paternal Grandfather     Hyperlipidemia Paternal Grandfather     Lung cancer Paternal Grandfather     No Known Problems Mother     No Known Problems Daughter     Cancer Maternal Grandfather         tongue    Breast cancer Paternal Aunt 52    Ovarian cancer Paternal Aunt 30    No Known Problems Paternal Aunt     No Known Problems Maternal Aunt      Allergies   Allergen Reactions    Enalapril      Reaction Date: 14Apr2011;   Pt denies 2/2018  Patient stated it may interact with sotalol.    Other      Other reaction(s): Other  Tongue feels funny    Penicillins Hives     Category: Allergy;           /78 (BP Location: Left arm, Patient Position: Sitting)   Ht 5' 2\" (1.575 m)   Wt 64.9 kg (143 lb)   BMI 26.16 kg/m²   Visit Vitals  /78 (BP Location: Left arm, Patient Position: Sitting)   Ht 5' 2\" (1.575 m)   Wt 64.9 kg (143 lb)   BMI 26.16 kg/m²   OB Status Unknown   Smoking Status Never   BSA 1.66 m²          Ortho Exam  RIGHT ELBOW:  No wounds, swelling, erythema, ecchymosis, or increased warmth  ROM full  Tenderness: lateral epicondyle  No " laxity of joint  Elbow strength flexion:  5/5  Elbow strength extension:  5/5  Elbow strength pronation: 5/5   Elbow strength supination: 5/5   Tinel's test ulnar nerve:  Negative   __________________________________________________________________________  Procedures

## 2024-06-04 ENCOUNTER — OFFICE VISIT (OUTPATIENT)
Dept: OBGYN CLINIC | Facility: OTHER | Age: 44
End: 2024-06-04
Payer: COMMERCIAL

## 2024-06-04 VITALS
WEIGHT: 143 LBS | SYSTOLIC BLOOD PRESSURE: 122 MMHG | BODY MASS INDEX: 26.31 KG/M2 | HEIGHT: 62 IN | DIASTOLIC BLOOD PRESSURE: 78 MMHG

## 2024-06-04 DIAGNOSIS — G89.29 CHRONIC PAIN OF RIGHT ELBOW: ICD-10-CM

## 2024-06-04 DIAGNOSIS — M25.521 CHRONIC PAIN OF RIGHT ELBOW: ICD-10-CM

## 2024-06-04 DIAGNOSIS — M77.11 LATERAL EPICONDYLITIS, RIGHT ELBOW: Primary | ICD-10-CM

## 2024-06-04 PROCEDURE — 99213 OFFICE O/P EST LOW 20 MIN: CPT | Performed by: FAMILY MEDICINE

## 2024-06-04 NOTE — PATIENT INSTRUCTIONS
PT protocol after PRP injection was given to the patient in the office.   Please start PT at 2 weeks marjan now.   Follow up in 4 weeks.

## 2024-06-05 ENCOUNTER — ANESTHESIA (OUTPATIENT)
Dept: GASTROENTEROLOGY | Facility: HOSPITAL | Age: 44
End: 2024-06-05

## 2024-06-05 ENCOUNTER — HOSPITAL ENCOUNTER (OUTPATIENT)
Dept: GASTROENTEROLOGY | Facility: HOSPITAL | Age: 44
Setting detail: OUTPATIENT SURGERY
Discharge: HOME | End: 2024-06-05
Attending: INTERNAL MEDICINE
Payer: COMMERCIAL

## 2024-06-05 ENCOUNTER — ANESTHESIA EVENT (OUTPATIENT)
Dept: GASTROENTEROLOGY | Facility: HOSPITAL | Age: 44
End: 2024-06-05

## 2024-06-05 VITALS
SYSTOLIC BLOOD PRESSURE: 114 MMHG | TEMPERATURE: 98.2 F | DIASTOLIC BLOOD PRESSURE: 60 MMHG | OXYGEN SATURATION: 100 % | HEART RATE: 79 BPM | RESPIRATION RATE: 21 BRPM

## 2024-06-05 DIAGNOSIS — Z80.0 FAMILY HISTORY OF COLON CANCER: ICD-10-CM

## 2024-06-05 LAB
EXT PREGNANCY TEST URINE: NEGATIVE
EXT. CONTROL: NORMAL

## 2024-06-05 PROCEDURE — 81025 URINE PREGNANCY TEST: CPT | Performed by: INTERNAL MEDICINE

## 2024-06-05 PROCEDURE — G0105 COLORECTAL SCRN; HI RISK IND: HCPCS | Performed by: INTERNAL MEDICINE

## 2024-06-05 RX ORDER — PROPOFOL 10 MG/ML
INJECTION, EMULSION INTRAVENOUS AS NEEDED
Status: DISCONTINUED | OUTPATIENT
Start: 2024-06-05 | End: 2024-06-05

## 2024-06-05 RX ORDER — LIDOCAINE HYDROCHLORIDE 20 MG/ML
INJECTION, SOLUTION EPIDURAL; INFILTRATION; INTRACAUDAL; PERINEURAL AS NEEDED
Status: DISCONTINUED | OUTPATIENT
Start: 2024-06-05 | End: 2024-06-05

## 2024-06-05 RX ORDER — SODIUM CHLORIDE, SODIUM LACTATE, POTASSIUM CHLORIDE, CALCIUM CHLORIDE 600; 310; 30; 20 MG/100ML; MG/100ML; MG/100ML; MG/100ML
INJECTION, SOLUTION INTRAVENOUS CONTINUOUS PRN
Status: DISCONTINUED | OUTPATIENT
Start: 2024-06-05 | End: 2024-06-05

## 2024-06-05 RX ADMIN — PROPOFOL 50 MG: 10 INJECTION, EMULSION INTRAVENOUS at 10:01

## 2024-06-05 RX ADMIN — PROPOFOL 50 MG: 10 INJECTION, EMULSION INTRAVENOUS at 09:53

## 2024-06-05 RX ADMIN — PROPOFOL 100 MG: 10 INJECTION, EMULSION INTRAVENOUS at 09:50

## 2024-06-05 RX ADMIN — LIDOCAINE HYDROCHLORIDE 100 MG: 20 INJECTION, SOLUTION EPIDURAL; INFILTRATION; INTRACAUDAL; PERINEURAL at 09:50

## 2024-06-05 RX ADMIN — PROPOFOL 50 MG: 10 INJECTION, EMULSION INTRAVENOUS at 09:57

## 2024-06-05 RX ADMIN — SODIUM CHLORIDE, SODIUM LACTATE, POTASSIUM CHLORIDE, AND CALCIUM CHLORIDE: .6; .31; .03; .02 INJECTION, SOLUTION INTRAVENOUS at 09:12

## 2024-06-05 NOTE — ANESTHESIA POSTPROCEDURE EVALUATION
Post-Op Assessment Note    CV Status:  Stable  Pain Score: 0    Pain management: adequate       Mental Status:  Alert and awake   Hydration Status:  Euvolemic   PONV Controlled:  Controlled   Airway Patency:  Patent     Post Op Vitals Reviewed: Yes    No anethesia notable event occurred.    Staff: Anesthesiologist, CRNA               BP 91/53 (06/05/24 1005)    Temp      Pulse (!) 139 (06/05/24 1005)   Resp 17 (06/05/24 1005)    SpO2 96 % (06/05/24 1005)

## 2024-06-05 NOTE — H&P
History and Physical -  [facility]  Morenita Jensen 43 y.o. female MRN: [id number]    HPI: Morenita Jensen is a 43 y.o. female who presents for colonoscopy for colorectal cancer screening.  Family history of colon cancer    REVIEW OF SYSTEMS: Per the HPI, and otherwise unremarkable.    Historical Information   Past Medical History:   Diagnosis Date    Acute respiratory failure with hypoxia (HCC)     Atrial fibrillation (HCC)     last assessed 03/14/2014    BRCA1 negative     BRCA2 negative     Complete transposition of great vessels     last assessed 02/14/2014    Eczema     GERD (gastroesophageal reflux disease)     History of cardiac cath     Cath RVOT subpulmonary obstruction, last assessed 06/19/2012    Hyperbilirubinemia (congenital)     Migraine     SSS (sick sinus syndrome) (HCC)     Torsion of small intestine (HCC)      Past Surgical History:   Procedure Laterality Date    ADENOIDECTOMY      onset childhood    ATRIAL SEPTECTOMY      balloon transvenous method    CARDIAC CATHETERIZATION  02/20/2024    CARDIAC PACEMAKER PLACEMENT      last assessed 03/14/2014    LAPAROSCOPIC LYSIS INTESTINAL ADHESIONS      OOPHORECTOMY      OTHER SURGICAL HISTORY      transposition repair mustard procedure, description 11 months    TONSILLECTOMY      TUBAL LIGATION      last assessed 08/17/2012    US GUIDED BREAST BIOPSY RIGHT COMPLETE Right 11/18/2019     Social History   Social History     Substance and Sexual Activity   Alcohol Use Yes    Comment: social     Social History     Substance and Sexual Activity   Drug Use No     Social History     Tobacco Use   Smoking Status Never   Smokeless Tobacco Never     Family History   Problem Relation Age of Onset    Cancer Father 63        hodgkins lymphoma     Melanoma Father     Colon cancer Sister 22        carcinoid tumor in the rectum    Heart disease Maternal Grandmother     Hyperlipidemia Maternal Grandmother     Diabetes Paternal Grandmother      Hypertension Paternal Grandmother     Lung cancer Paternal Grandmother     Heart disease Paternal Grandfather     Hyperlipidemia Paternal Grandfather     Lung cancer Paternal Grandfather     No Known Problems Mother     No Known Problems Daughter     Cancer Maternal Grandfather         tongue    Breast cancer Paternal Aunt 52    Ovarian cancer Paternal Aunt 30    No Known Problems Paternal Aunt     No Known Problems Maternal Aunt        Meds/Allergies       Current Outpatient Medications:     ascorbic acid (VITAMIN C) 500 mg tablet    aspirin (ECOTRIN LOW STRENGTH) 81 mg EC tablet    cetirizine (ZyrTEC) 10 mg tablet    Cholecalciferol 25 MCG (1000 UT) tablet    ELDERBERRY PO    meclizine (ANTIVERT) 25 mg tablet    metoprolol succinate (TOPROL-XL) 25 mg 24 hr tablet    MULTIPLE VITAMINS-MINERALS PO    sotalol (BETAPACE) 120 mg tablet    Cyanocobalamin (VITAMIN B 12 PO)    hydrocortisone 2.5 % cream  No current facility-administered medications for this encounter.    Facility-Administered Medications Ordered in Other Encounters:     lactated ringers infusion, , Intravenous, Continuous PRN, New Bag at 06/05/24 0912    Allergies   Allergen Reactions    Enalapril      Reaction Date: 14Apr2011;   Pt denies 2/2018  Patient stated it may interact with sotalol.    Other      Other reaction(s): Other  Tongue feels funny    Penicillins Hives     Category: Allergy;        Objective     /58   Pulse 73   Temp 98.2 °F (36.8 °C)   Resp 16   SpO2 98%     PHYSICAL EXAM    Gen: NAD AAOx3  Head: Normocephalic, Atraumatic  CV: S1S2 RRR no m/r/g  CHEST: Clear b/l no c/r/w  ABD: soft, +BS NT/ND  EXT: no edema    ASSESSMENT/PLAN:  This is a 43 y.o. year old female here for colonoscopy, and she is stable and optimized for her procedure.

## 2024-06-05 NOTE — ANESTHESIA PREPROCEDURE EVALUATION
Procedure:  COLONOSCOPY    Relevant Problems   CARDIO   (+) Complete transposition of great vessels   (+) PVCs (premature ventricular contractions)   (+) RBBB   (+) Rib pain on right side   (+) Sinoatrial node dysfunction (HCC)      GI/HEPATIC   (+) Esophageal reflux      MUSCULOSKELETAL   (+) Lateral epicondylitis of right elbow   (+) Lateral epicondylitis, left elbow      NEURO/PSYCH   (+) Anxiety   (+) Chronic pain of right elbow   (+) Chronic right shoulder pain   (+) New onset headache   (+) Numbness and tingling      Pacemaker upper left abdomen, not an ICD. Clearance form located under media 5/31/2024. If electrocautery is used, magnet will be needed.    Pt is not pacemaker dependent.     Physical Exam    Airway    Mallampati score: II  TM Distance: <3 FB  Neck ROM: full     Dental   No notable dental hx     Cardiovascular      Pulmonary      Other Findings  post-pubertal.      Anesthesia Plan  ASA Score- 3     Anesthesia Type- IV sedation with anesthesia with ASA Monitors.         Additional Monitors:     Airway Plan:            Plan Factors-Exercise tolerance (METS): >4 METS.    Chart reviewed.    Patient summary reviewed.                  Induction- intravenous.    Postoperative Plan-     Perioperative Resuscitation Plan - Level 1 - Full Code.       Informed Consent- Anesthetic plan and risks discussed with patient.  I personally reviewed this patient with the CRNA. Discussed and agreed on the Anesthesia Plan with the CRNA..

## 2024-06-27 PROBLEM — Z00.00 WELL ADULT EXAM: Status: RESOLVED | Noted: 2020-05-11 | Resolved: 2024-06-27

## 2024-07-01 ENCOUNTER — OFFICE VISIT (OUTPATIENT)
Dept: OBGYN CLINIC | Facility: CLINIC | Age: 44
End: 2024-07-01
Payer: COMMERCIAL

## 2024-07-01 VITALS
BODY MASS INDEX: 26.31 KG/M2 | WEIGHT: 143 LBS | SYSTOLIC BLOOD PRESSURE: 110 MMHG | HEIGHT: 62 IN | DIASTOLIC BLOOD PRESSURE: 70 MMHG

## 2024-07-01 DIAGNOSIS — M77.11 LATERAL EPICONDYLITIS, RIGHT ELBOW: Primary | ICD-10-CM

## 2024-07-01 PROCEDURE — 99213 OFFICE O/P EST LOW 20 MIN: CPT | Performed by: FAMILY MEDICINE

## 2024-07-01 NOTE — PATIENT INSTRUCTIONS
Continue physical therapy but to avoid any shockwave or Graston Technique moving forward due to risk of iatrogenic injury  Recommended wearing wrist brace at bedtime as well as wrapping towel around the elbow to prevent flexion in hopes of reducing stiffness  I explained the patient that it is usual to still have residual pain at 6 weeks out from PRP for late epicondylitis

## 2024-07-01 NOTE — PROGRESS NOTES
1. Lateral epicondylitis, right elbow          No orders of the defined types were placed in this encounter.       ASSESSMENT/PLAN:  Right elbow lateral tendinitis  Status post right elbow lateral epicondyle PRP injection on 5/21/2024  Follow-up interval: 6 weeks    Repeat X-ray next visit: None    Return in about 6 weeks (around 8/12/2024).    Patient instructions below verbally summarized in person during encounter:  Patient Instructions   Continue physical therapy but to avoid any shockwave or Graston Technique moving forward due to risk of iatrogenic injury  Recommended wearing wrist brace at bedtime as well as wrapping towel around the elbow to prevent flexion in hopes of reducing stiffness  I explained the patient that it is usual to still have residual pain at 6 weeks out from PRP for late epicondylitis      __________________________________________________________________________    HISTORY OF PRESENT ILLNESS:  Follow-up for right tennis elbow.  Patient has a past med history of left-sided tennis elbow resolved status post PRP.  She recently underwent PRP for the right elbow on 5/21/2024.    Today, she still has residual pain that is improved since her procedure but not improved compared to prior to her procedure.  She has morning stiffness of the elbow.          Review of Systems      Following history reviewed and update:    Past Medical History:   Diagnosis Date    Acute respiratory failure with hypoxia (HCC)     Atrial fibrillation (HCC)     last assessed 03/14/2014    BRCA1 negative     BRCA2 negative     Complete transposition of great vessels     last assessed 02/14/2014    Eczema     GERD (gastroesophageal reflux disease)     History of cardiac cath     Cath RVOT subpulmonary obstruction, last assessed 06/19/2012    Hyperbilirubinemia (congenital)     Migraine     SSS (sick sinus syndrome) (HCC)     Torsion of small intestine (HCC)      Past Surgical History:   Procedure Laterality Date     "ADENOIDECTOMY      onset childhood    ATRIAL SEPTECTOMY      balloon transvenous method    CARDIAC CATHETERIZATION  02/20/2024    CARDIAC PACEMAKER PLACEMENT      last assessed 03/14/2014    LAPAROSCOPIC LYSIS INTESTINAL ADHESIONS      OOPHORECTOMY      OTHER SURGICAL HISTORY      transposition repair mustard procedure, description 11 months    TONSILLECTOMY      TUBAL LIGATION      last assessed 08/17/2012    US GUIDED BREAST BIOPSY RIGHT COMPLETE Right 11/18/2019     Social History   Social History     Substance and Sexual Activity   Alcohol Use Yes    Comment: social     Social History     Substance and Sexual Activity   Drug Use No     Social History     Tobacco Use   Smoking Status Never   Smokeless Tobacco Never     Family History   Problem Relation Age of Onset    Cancer Father 63        hodgkins lymphoma     Melanoma Father     Colon cancer Sister 22        carcinoid tumor in the rectum    Heart disease Maternal Grandmother     Hyperlipidemia Maternal Grandmother     Diabetes Paternal Grandmother     Hypertension Paternal Grandmother     Lung cancer Paternal Grandmother     Heart disease Paternal Grandfather     Hyperlipidemia Paternal Grandfather     Lung cancer Paternal Grandfather     No Known Problems Mother     No Known Problems Daughter     Cancer Maternal Grandfather         tongue    Breast cancer Paternal Aunt 52    Ovarian cancer Paternal Aunt 30    No Known Problems Paternal Aunt     No Known Problems Maternal Aunt      Allergies   Allergen Reactions    Enalapril      Reaction Date: 14Apr2011;   Pt denies 2/2018  Patient stated it may interact with sotalol.    Other      Other reaction(s): Other  Tongue feels funny    Penicillins Hives     Category: Allergy;           Physical Exam  /70 (BP Location: Left arm, Patient Position: Sitting, Cuff Size: Standard)   Ht 5' 2\" (1.575 m)   Wt 64.9 kg (143 lb)   BMI 26.16 kg/m²         Right Elbow Exam     Tenderness   The patient is experiencing " tenderness in the lateral epicondyle.     Range of Motion   The patient has normal right elbow ROM.    Muscle Strength   The patient has normal right elbow strength.    Other   Sensation: normal    Comments:  Chief complaint of pain reproduced at lateral epicondyle with resisted isometric contraction of wrist and finger extensors            __________________________________________________________________________  Procedures

## 2024-07-08 ENCOUNTER — TELEPHONE (OUTPATIENT)
Age: 44
End: 2024-07-08

## 2024-07-08 NOTE — TELEPHONE ENCOUNTER
Confirmation - Referral [id number]  Effective: 06/26/2024     Expires: 09/24/2024  Active  Referred From  [facility]  Specialty: [facility]  Group NPI: [id number]  Provider ID: [id number]  Tax ID: [id number]  [address] [place] [address]  Referred To  [facility]  Specialty: Multi-Specialty Group  Group NPI: [id number]  Provider ID: [id number]  Tax ID: [id number]  This referral is valid at any location for the above group  Patient Info  CHATO RUIZ  [id number]  Female  1980  [address] [place] [address]  Clinical Information  Place of Service  Office  Service Type  Medical Care  Diagnosis  1Z95.0 - presence of cardiac pacemaker  2I49.5 - sick sinus syndrome  Procedures  967094 - interrogation device evaluation(s) (remote), up to 90 days; single, dual, or multiple lead pacemaker system, or leadless pacemaker system with interim analysis, review(s) and report(s) by a physician or other qualified health care professio nal  [id number] - interrogation device evaluation(s) (remote), up to 90 days; single, dual, or multiple lead pacemaker system, leadless pacemaker system, or implantable defibrillator system, remote data acquisition(s), receipt of transmissions and technician review, technical support and distribution of results

## 2024-07-08 NOTE — TELEPHONE ENCOUNTER
Germaine from [facility] calling to request back data of a referral. Please see below for the referral information. If you need any additional information, please contact Germaine at [phone].    D.O.S: 6/26/24  CPT Codes: 81917, 47920  Dx Codes: Z95.0, I49.5  NPI: [id number]

## 2024-07-15 ENCOUNTER — TELEPHONE (OUTPATIENT)
Dept: FAMILY MEDICINE CLINIC | Facility: CLINIC | Age: 44
End: 2024-07-15

## 2024-07-16 DIAGNOSIS — Z13.228 SCREENING FOR ENDOCRINE, METABOLIC AND IMMUNITY DISORDER: ICD-10-CM

## 2024-07-16 DIAGNOSIS — R42 VERTIGO: ICD-10-CM

## 2024-07-16 DIAGNOSIS — F41.9 ANXIETY: Primary | ICD-10-CM

## 2024-07-16 DIAGNOSIS — Z13.0 SCREENING FOR ENDOCRINE, METABOLIC AND IMMUNITY DISORDER: ICD-10-CM

## 2024-07-16 DIAGNOSIS — Z13.0 SCREENING FOR DEFICIENCY ANEMIA: ICD-10-CM

## 2024-07-16 DIAGNOSIS — Z13.1 SCREENING FOR DIABETES MELLITUS (DM): ICD-10-CM

## 2024-07-16 DIAGNOSIS — Z13.29 SCREENING FOR ENDOCRINE, METABOLIC AND IMMUNITY DISORDER: ICD-10-CM

## 2024-07-16 DIAGNOSIS — N91.2 AMENORRHEA: ICD-10-CM

## 2024-07-16 DIAGNOSIS — Z13.220 SCREENING FOR LIPID DISORDERS: ICD-10-CM

## 2024-07-16 DIAGNOSIS — R00.2 PALPITATIONS: ICD-10-CM

## 2024-07-16 DIAGNOSIS — E80.4 GILBERT'S SYNDROME: ICD-10-CM

## 2024-07-30 LAB
ALBUMIN SERPL-MCNC: 4.5 G/DL (ref 3.9–4.9)
ALP SERPL-CCNC: 65 IU/L (ref 44–121)
ALT SERPL-CCNC: 20 IU/L (ref 0–32)
AST SERPL-CCNC: 21 IU/L (ref 0–40)
BASOPHILS # BLD AUTO: 0 X10E3/UL (ref 0–0.2)
BASOPHILS NFR BLD AUTO: 0 %
BILIRUB SERPL-MCNC: 1.4 MG/DL (ref 0–1.2)
BUN SERPL-MCNC: 10 MG/DL (ref 6–24)
BUN/CREAT SERPL: 14 (ref 9–23)
CALCIUM SERPL-MCNC: 9.7 MG/DL (ref 8.7–10.2)
CHLORIDE SERPL-SCNC: 105 MMOL/L (ref 96–106)
CHOLEST SERPL-MCNC: 237 MG/DL (ref 100–199)
CO2 SERPL-SCNC: 21 MMOL/L (ref 20–29)
CREAT SERPL-MCNC: 0.71 MG/DL (ref 0.57–1)
EGFR: 109 ML/MIN/1.73
EOSINOPHIL # BLD AUTO: 0.3 X10E3/UL (ref 0–0.4)
EOSINOPHIL NFR BLD AUTO: 5 %
ERYTHROCYTE [DISTWIDTH] IN BLOOD BY AUTOMATED COUNT: 12.4 % (ref 11.7–15.4)
GLOBULIN SER-MCNC: 2.3 G/DL (ref 1.5–4.5)
GLUCOSE SERPL-MCNC: 98 MG/DL (ref 70–99)
HCG INTACT+B SERPL-ACNC: 3 MIU/ML
HCT VFR BLD AUTO: 37.8 % (ref 34–46.6)
HDLC SERPL-MCNC: 67 MG/DL
HGB BLD-MCNC: 12.5 G/DL (ref 11.1–15.9)
IMM GRANULOCYTES # BLD: 0 X10E3/UL (ref 0–0.1)
IMM GRANULOCYTES NFR BLD: 0 %
LDLC SERPL CALC-MCNC: 147 MG/DL (ref 0–99)
LDLC/HDLC SERPL: 2.2 RATIO (ref 0–3.2)
LYMPHOCYTES # BLD AUTO: 1.4 X10E3/UL (ref 0.7–3.1)
LYMPHOCYTES NFR BLD AUTO: 28 %
MCH RBC QN AUTO: 29.6 PG (ref 26.6–33)
MCHC RBC AUTO-ENTMCNC: 33.1 G/DL (ref 31.5–35.7)
MCV RBC AUTO: 90 FL (ref 79–97)
MONOCYTES # BLD AUTO: 0.4 X10E3/UL (ref 0.1–0.9)
MONOCYTES NFR BLD AUTO: 9 %
NEUTROPHILS # BLD AUTO: 2.8 X10E3/UL (ref 1.4–7)
NEUTROPHILS NFR BLD AUTO: 58 %
PLATELET # BLD AUTO: 165 X10E3/UL (ref 150–450)
POTASSIUM SERPL-SCNC: 4 MMOL/L (ref 3.5–5.2)
PROT SERPL-MCNC: 6.8 G/DL (ref 6–8.5)
RBC # BLD AUTO: 4.22 X10E6/UL (ref 3.77–5.28)
SL AMB VLDL CHOLESTEROL CALC: 23 MG/DL (ref 5–40)
SODIUM SERPL-SCNC: 141 MMOL/L (ref 134–144)
TRIGL SERPL-MCNC: 130 MG/DL (ref 0–149)
TSH SERPL DL<=0.005 MIU/L-ACNC: 0.74 UIU/ML (ref 0.45–4.5)
WBC # BLD AUTO: 4.9 X10E3/UL (ref 3.4–10.8)

## 2024-07-31 ENCOUNTER — HOSPITAL ENCOUNTER (OUTPATIENT)
Dept: ULTRASOUND IMAGING | Facility: HOSPITAL | Age: 44
Discharge: HOME | End: 2024-07-31
Payer: COMMERCIAL

## 2024-07-31 DIAGNOSIS — R10.9 ABDOMINAL PRESSURE: ICD-10-CM

## 2024-07-31 PROCEDURE — 76856 US EXAM PELVIC COMPLETE: CPT

## 2024-07-31 PROCEDURE — 76830 TRANSVAGINAL US NON-OB: CPT

## 2024-08-05 ENCOUNTER — TELEPHONE (OUTPATIENT)
Age: 44
End: 2024-08-05

## 2024-08-05 NOTE — TELEPHONE ENCOUNTER
Patient is requesting an insurance referral for the following specialty:      Test Name / Order Name: Evaluate & Treat Eczema    DX Code: D48.5    Date Of Service: 8/6/24    Location/Facility Name/Address/Phone #:          [facility]         [address] [place] [address] [phone]    Location / Facility NPI:          [id number]    Best Phone # To Reach The Patient:         [phone]

## 2024-08-12 ENCOUNTER — OFFICE VISIT (OUTPATIENT)
Dept: OBGYN CLINIC | Facility: CLINIC | Age: 44
End: 2024-08-12
Payer: COMMERCIAL

## 2024-08-12 VITALS
DIASTOLIC BLOOD PRESSURE: 70 MMHG | SYSTOLIC BLOOD PRESSURE: 112 MMHG | BODY MASS INDEX: 26.31 KG/M2 | WEIGHT: 143 LBS | HEIGHT: 62 IN

## 2024-08-12 DIAGNOSIS — M77.11 LATERAL EPICONDYLITIS, RIGHT ELBOW: Primary | ICD-10-CM

## 2024-08-12 PROCEDURE — 99213 OFFICE O/P EST LOW 20 MIN: CPT | Performed by: FAMILY MEDICINE

## 2024-08-12 NOTE — PATIENT INSTRUCTIONS
I explained the patient that clinically she is 75+ percent overall despite her subjective report of 90%.  She continues to have some mild soreness at the site of her lateral condyle as well as mild pain on resisted contraction of her finger extensors.  As such I recommended continuing home exercise program as well as formal physical therapy.  She has approximately 2 more weeks of formal physical therapy and she is a transition to home exercise program to complete in the next 6 weeks.  I recommended increasing protein intake to help with tendon healing and to also have at least 1 day of rest in between.  On days of work where she is performing vigorous activity with her arm she is to reduce her physical therapy protocol but still perform some exercises.

## 2024-08-12 NOTE — PROGRESS NOTES
1. Lateral epicondylitis, right elbow          No orders of the defined types were placed in this encounter.       PAST REPORTS:  Xray right elbow 4/22/24: no acute osseous abnormality      ASSESSMENT/PLAN:  Right elbow lateral tendinitis  Status post right elbow lateral epicondyle PRP injection on 5/21/2024  Follow-up interval:    Repeat X-ray next visit: None    Return if symptoms worsen or fail to improve.    Patient instructions below verbally summarized in person during encounter:  Patient Instructions   I explained the patient that clinically she is 75+ percent overall despite her subjective report of 90%.  She continues to have some mild soreness at the site of her lateral condyle as well as mild pain on resisted contraction of her finger extensors.  As such I recommended continuing home exercise program as well as formal physical therapy.  She has approximately 2 more weeks of formal physical therapy and she is a transition to home exercise program to complete in the next 6 weeks.  I recommended increasing protein intake to help with tendon healing and to also have at least 1 day of rest in between.  On days of work where she is performing vigorous activity with her arm she is to reduce her physical therapy protocol but still perform some exercises.      __________________________________________________________________________    HISTORY OF PRESENT ILLNESS:  Right Elbow PRP 5/21/24.     Patient presents for follow-up 3 months and is satisfied with her current level relief.  Overall she says she is 90% improved with pain compared to prior to her procedure.  She has been attending physical therapy regularly at least twice a week as well as performing home exercise program.  She has been able to return to work.        Review of Systems      Following history reviewed and update:    Past Medical History:   Diagnosis Date    Acute respiratory failure with hypoxia (HCC)     Atrial fibrillation (HCC)     last  assessed 03/14/2014    BRCA1 negative     BRCA2 negative     Complete transposition of great vessels     last assessed 02/14/2014    Eczema     GERD (gastroesophageal reflux disease)     History of cardiac cath     Cath RVOT subpulmonary obstruction, last assessed 06/19/2012    Hyperbilirubinemia (congenital)     Migraine     SSS (sick sinus syndrome) (HCC)     Torsion of small intestine (HCC)      Past Surgical History:   Procedure Laterality Date    ADENOIDECTOMY      onset childhood    ATRIAL SEPTECTOMY      balloon transvenous method    CARDIAC CATHETERIZATION  02/20/2024    CARDIAC PACEMAKER PLACEMENT      last assessed 03/14/2014    LAPAROSCOPIC LYSIS INTESTINAL ADHESIONS      OOPHORECTOMY      OTHER SURGICAL HISTORY      transposition repair mustard procedure, description 11 months    TONSILLECTOMY      TUBAL LIGATION      last assessed 08/17/2012    US GUIDED BREAST BIOPSY RIGHT COMPLETE Right 11/18/2019     Social History   Social History     Substance and Sexual Activity   Alcohol Use Yes    Comment: social     Social History     Substance and Sexual Activity   Drug Use No     Social History     Tobacco Use   Smoking Status Never   Smokeless Tobacco Never     Family History   Problem Relation Age of Onset    Cancer Father 63        hodgkins lymphoma     Melanoma Father     Colon cancer Sister 22        carcinoid tumor in the rectum    Heart disease Maternal Grandmother     Hyperlipidemia Maternal Grandmother     Diabetes Paternal Grandmother     Hypertension Paternal Grandmother     Lung cancer Paternal Grandmother     Heart disease Paternal Grandfather     Hyperlipidemia Paternal Grandfather     Lung cancer Paternal Grandfather     No Known Problems Mother     No Known Problems Daughter     Cancer Maternal Grandfather         tongue    Breast cancer Paternal Aunt 52    Ovarian cancer Paternal Aunt 30    No Known Problems Paternal Aunt     No Known Problems Maternal Aunt      Allergies   Allergen Reactions  "   Enalapril      Reaction Date: 14Apr2011;   Pt denies 2/2018  Patient stated it may interact with sotalol.    Other      Other reaction(s): Other  Tongue feels funny    Penicillins Hives     Category: Allergy;           Physical Exam  /70 (BP Location: Left arm, Patient Position: Sitting, Cuff Size: Standard)   Ht 5' 2\" (1.575 m)   Wt 64.9 kg (143 lb)   BMI 26.16 kg/m²         Right Elbow Exam     Tenderness   The patient is experiencing tenderness in the lateral epicondyle (mild).     Range of Motion   The patient has normal right elbow ROM.    Muscle Strength   The patient has normal right elbow strength.    Other   Erythema: absent  Scars: absent  Sensation: normal    Comments:  Chief complaint of pain reproduced at lateral epicondyle with resisted isometric contraction of wrist and finger extensors. Mild.         __________________________________________________________________________  Procedures                  "

## 2024-08-14 ENCOUNTER — TELEPHONE (OUTPATIENT)
Age: 44
End: 2024-08-14

## 2024-08-14 NOTE — TELEPHONE ENCOUNTER
Pt called for Approval form PCP to get an Advanced US/Biopsy done in [facility]. Pt didn't know any other details as far as the address and Dr name. Pt called her insurance company that told her she needed to get the approval from PCP to go out of [facility]. Pt would like a call back please

## 2024-08-28 ENCOUNTER — OFFICE VISIT (OUTPATIENT)
Dept: FAMILY MEDICINE CLINIC | Facility: CLINIC | Age: 44
End: 2024-08-28
Payer: COMMERCIAL

## 2024-08-28 VITALS
SYSTOLIC BLOOD PRESSURE: 106 MMHG | HEART RATE: 74 BPM | TEMPERATURE: 97.9 F | HEIGHT: 62 IN | DIASTOLIC BLOOD PRESSURE: 68 MMHG | BODY MASS INDEX: 26.68 KG/M2 | WEIGHT: 145 LBS | OXYGEN SATURATION: 98 %

## 2024-08-28 DIAGNOSIS — R35.0 URINARY FREQUENCY: Primary | ICD-10-CM

## 2024-08-28 DIAGNOSIS — Z12.31 ENCOUNTER FOR SCREENING MAMMOGRAM FOR MALIGNANT NEOPLASM OF BREAST: ICD-10-CM

## 2024-08-28 LAB
SL AMB  POCT GLUCOSE, UA: NEGATIVE
SL AMB LEUKOCYTE ESTERASE,UA: NEGATIVE
SL AMB POCT BILIRUBIN,UA: NEGATIVE
SL AMB POCT BLOOD,UA: ABNORMAL
SL AMB POCT CLARITY,UA: CLEAR
SL AMB POCT COLOR,UA: YELLOW
SL AMB POCT KETONES,UA: NEGATIVE
SL AMB POCT NITRITE,UA: NEGATIVE
SL AMB POCT PH,UA: 7
SL AMB POCT SPECIFIC GRAVITY,UA: 1.01
SL AMB POCT URINE PROTEIN: NEGATIVE
SL AMB POCT UROBILINOGEN: 0.2

## 2024-08-28 PROCEDURE — 99214 OFFICE O/P EST MOD 30 MIN: CPT | Performed by: NURSE PRACTITIONER

## 2024-08-28 PROCEDURE — 81002 URINALYSIS NONAUTO W/O SCOPE: CPT | Performed by: NURSE PRACTITIONER

## 2024-08-28 NOTE — PROGRESS NOTES
Ambulatory Visit  Name: Morenita Jensen      : 1980      MRN: [id number]  Encounter Provider: SAE Cullen  Encounter Date: 2024   Encounter department: [facility]    Assessment & Plan   1. Urinary frequency  Assessment & Plan:  Urine dip collected, will send for culture  Given abnormal HCG tests and pelvic pains   Continue GYN and maternal fetal specialists  Orders:  -     POCT urine dip  -     Urine culture  2. Encounter for screening mammogram for malignant neoplasm of breast  -     Mammo screening bilateral w 3d & cad       History of Present Illness     Started last week with right back pain  Was intermittent and now more persistent  Worse with movements, walking hurts it  No pain at rest  Has tried tried advil and tylenol that does help    LMP 24 spotted  for an hour, then spotting again last 3 days. Will go with same panty liner on all day.   Has had cramping abdominally and nauseous    Back Pain  This is a new problem. The current episode started in the past 7 days. The problem occurs daily. The problem has been gradually worsening since onset. The pain is present in the lumbar spine. The quality of the pain is described as shooting. The pain does not radiate. The pain is at a severity of 4/10. The pain is Worse during the day. The symptoms are aggravated by bending, position, standing and twisting. Stiffness is present All day. Associated symptoms include abdominal pain, dysuria and pelvic pain. Pertinent negatives include no bladder incontinence, bowel incontinence, chest pain, fever, headaches, leg pain, numbness, paresis, paresthesias, perianal numbness, tingling, weakness or weight loss.       Review of Systems   Constitutional:  Negative for fever and weight loss.   Cardiovascular:  Negative for chest pain.   Gastrointestinal:  Positive for abdominal pain. Negative for bowel incontinence.   Genitourinary:  Positive for dysuria and pelvic  "pain. Negative for bladder incontinence.   Musculoskeletal:  Positive for back pain.   Neurological:  Negative for tingling, weakness, numbness, headaches and paresthesias.       Objective     /68 (BP Location: Left arm, Patient Position: Sitting, Cuff Size: Standard)   Pulse 74   Temp 97.9 °F (36.6 °C) (Tympanic)   Ht 5' 2\" (1.575 m)   Wt 65.8 kg (145 lb)   SpO2 98%   BMI 26.52 kg/m²     Physical Exam  Vitals and nursing note reviewed.   Constitutional:       General: She is not in acute distress.     Appearance: She is not ill-appearing.   HENT:      Head: Normocephalic and atraumatic.   Cardiovascular:      Rate and Rhythm: Normal rate and regular rhythm.      Pulses: Normal pulses.      Heart sounds: Murmur heard.   Pulmonary:      Effort: Pulmonary effort is normal.      Breath sounds: Normal breath sounds.   Abdominal:      General: Abdomen is flat. Bowel sounds are normal.      Palpations: Abdomen is soft.   Musculoskeletal:      Right lower leg: No edema.      Left lower leg: No edema.   Skin:     General: Skin is warm.   Neurological:      General: No focal deficit present.      Mental Status: She is alert and oriented to person, place, and time.       Administrative Statements           "

## 2024-08-30 ENCOUNTER — TELEPHONE (OUTPATIENT)
Dept: ADMINISTRATIVE | Facility: OTHER | Age: 44
End: 2024-08-30

## 2024-08-30 LAB
BACTERIA UR CULT: NORMAL
Lab: NORMAL

## 2024-08-30 NOTE — TELEPHONE ENCOUNTER
Upon review of the In Basket request we  encounter opened in error    Any additional questions or concerns should be emailed to the Practice Liaisons via the appropriate education email address, please do not reply via In Basket.    Thank you  Ca March MA   [facility]

## 2024-08-30 NOTE — RESULT ENCOUNTER NOTE
Shekhar Xavier, Your urine culture is negative for infection. Recommend continued appointment with maternal fetal specialist. All referrals are placed for you to continue with you [facility]. Have a great weekend.

## 2024-09-03 ENCOUNTER — TELEPHONE (OUTPATIENT)
Facility: HOSPITAL | Age: 44
End: 2024-09-03

## 2024-09-03 NOTE — TELEPHONE ENCOUNTER
Received referral from [facility] that stated PT's insurance does not accept [facility] and [facility]. Based on referral info received, PT is not currently pregnant and needs to be evaluated and treated for pelvic pain so possibly needs another department besides [facility]. Told PT this info in VM and stated she can check on [facility]'s website for correct department phone number.

## 2024-09-12 ENCOUNTER — HOSPITAL ENCOUNTER (OUTPATIENT)
Dept: ULTRASOUND IMAGING | Facility: HOSPITAL | Age: 44
Discharge: HOME | End: 2024-09-12

## 2024-09-12 DIAGNOSIS — Z87.74 H/O MUSTARD PROCEDURE: ICD-10-CM

## 2024-09-16 PROBLEM — R35.0 URINARY FREQUENCY: Status: ACTIVE | Noted: 2024-09-16

## 2024-09-16 NOTE — ASSESSMENT & PLAN NOTE
Urine dip collected, will send for culture  Given abnormal HCG tests and pelvic pains   Continue GYN and maternal fetal specialists

## 2024-09-28 ENCOUNTER — HOSPITAL ENCOUNTER (OUTPATIENT)
Dept: ULTRASOUND IMAGING | Facility: HOSPITAL | Age: 44
Discharge: HOME | End: 2024-09-28
Payer: COMMERCIAL

## 2024-09-28 PROCEDURE — 76705 ECHO EXAM OF ABDOMEN: CPT

## 2024-10-15 ENCOUNTER — HOSPITAL ENCOUNTER (OUTPATIENT)
Dept: MAMMOGRAPHY | Facility: IMAGING CENTER | Age: 44
Discharge: HOME | End: 2024-10-15
Payer: COMMERCIAL

## 2024-10-15 VITALS — WEIGHT: 145 LBS | HEIGHT: 62 IN | BODY MASS INDEX: 26.68 KG/M2

## 2024-10-15 PROCEDURE — 77067 SCR MAMMO BI INCL CAD: CPT

## 2024-10-15 PROCEDURE — 77063 BREAST TOMOSYNTHESIS BI: CPT

## 2024-10-17 ENCOUNTER — TELEPHONE (OUTPATIENT)
Dept: FAMILY MEDICINE CLINIC | Facility: CLINIC | Age: 44
End: 2024-10-17

## 2024-10-17 NOTE — TELEPHONE ENCOUNTER
----- Message from SAE Amanda sent at 10/17/2024  1:55 PM EDT -----  Your mammogram was normal.  Repeat in 1 year is recommended.

## 2024-10-23 ENCOUNTER — TELEPHONE (OUTPATIENT)
Age: 44
End: 2024-10-23

## 2024-10-23 NOTE — TELEPHONE ENCOUNTER
Confirmation - Referral [id number]  Effective: 09/25/2024     Expires: 12/24/2024  Active  Referred From  [facility]  Specialty: Continuing Care residential Center  Group NPI: [id number]  Provider ID: [id number]  Tax ID: [id number]  [address] [place] [address]  Referred To  [facility] CARDIOLOGY  Specialty: Multi-Specialty Group  Group NPI: [id number]  Provider ID: [id number]  Tax ID: [id number]  This referral is valid at any location for the above group  Patient Info  CHATO RUIZ  [id number]  Female  1980  [address] [place] [address]  Clinical Information  Place of Service  Office  Service Type  Medical Care  Diagnosis  1I49.5 - sick sinus syndrome  2Z95.0 - presence of cardiac pacemaker  Procedures  251945 - interrogation device evaluation(s) (remote), up to 90 days; single, dual, or multiple lead pacemaker system, or leadless pacemaker system with interim analysis, review(s) and report(s) by a physician or other qualified health care professio nal  744457 - interrogation device evaluation(s) (remote), up to 90 days; single, dual, or multiple lead pacemaker system, leadless pacemaker system, or implantable defibrillator system, remote data acquisition(s), receipt of transmissions and technician review, technical support and distribution of results

## 2024-10-23 NOTE — TELEPHONE ENCOUNTER
Patient needs AMB referral to Cardiology. Once order is placed please forward this message to [facility] AUTH [facility]             Patient is requesting an insurance referral for the following specialty:      Test Name / Order Name: CPT 27068 and 87042    DX Code: I49.5 & Z95.0    Date Of Service: 09/25/24--BACKDATED    Location/Facility Name/Address/Phone #: [facility]  [facility] [place], [place]  [phone]      Location / Facility NPI: [id number]    Best Phone # To Reach The Patient:

## 2024-10-24 NOTE — TELEPHONE ENCOUNTER
Is this insurance referral good for this appt 10/31/2024  Confirmation - Referral [id number]  Effective: 09/25/2024     Expires: 12/24/2024     Patient is requesting an insurance referral for the following specialty:      Test Name / Order Name:  [id number]    DX Code: R68.89    Date Of Service: 10/31    Location/Facility Name/Address/Phone #: [phone]  [facility] [address] [facility]     Location / Facility NPI: [id number]    [facility] Phone # To Reach The Patient: [phone]

## 2024-10-29 DIAGNOSIS — Z00.6 ENCOUNTER FOR EXAMINATION FOR NORMAL COMPARISON OR CONTROL IN CLINICAL RESEARCH PROGRAM: ICD-10-CM

## 2024-10-30 NOTE — TELEPHONE ENCOUNTER
"Pt called to confirm her ins auth for cardio appt at [facility] on 10/31/24. Please call pt as referral was not in chart under \"Referrals\" tab.   "

## 2024-12-11 ENCOUNTER — OFFICE VISIT (OUTPATIENT)
Dept: FAMILY MEDICINE CLINIC | Facility: CLINIC | Age: 44
End: 2024-12-11
Payer: COMMERCIAL

## 2024-12-11 VITALS
SYSTOLIC BLOOD PRESSURE: 106 MMHG | BODY MASS INDEX: 27.05 KG/M2 | DIASTOLIC BLOOD PRESSURE: 70 MMHG | HEIGHT: 62 IN | HEART RATE: 78 BPM | WEIGHT: 147 LBS | OXYGEN SATURATION: 98 % | TEMPERATURE: 97.9 F

## 2024-12-11 DIAGNOSIS — J30.1 NON-SEASONAL ALLERGIC RHINITIS DUE TO POLLEN: Primary | ICD-10-CM

## 2024-12-11 DIAGNOSIS — N91.2 AMENORRHEA: ICD-10-CM

## 2024-12-11 LAB — SL AMB POCT URINE HCG: POSITIVE

## 2024-12-11 PROCEDURE — 81025 URINE PREGNANCY TEST: CPT | Performed by: FAMILY MEDICINE

## 2024-12-11 PROCEDURE — 99214 OFFICE O/P EST MOD 30 MIN: CPT | Performed by: FAMILY MEDICINE

## 2024-12-11 RX ORDER — MONTELUKAST SODIUM 10 MG/1
10 TABLET ORAL
Qty: 30 TABLET | Refills: 5 | Status: SHIPPED | OUTPATIENT
Start: 2024-12-11

## 2024-12-11 RX ORDER — FUROSEMIDE 20 MG/1
TABLET ORAL
COMMUNITY

## 2024-12-11 NOTE — PROGRESS NOTES
"Name: Morenita Jensen      : 1980      MRN: [id number]  Encounter Provider: Genet Lopez DO  Encounter Date: 2024   Encounter department: [facility]  :  Assessment & Plan  Non-seasonal allergic rhinitis due to pollen  Continue with zyrtec and flonase nasal spray  Add singulair 1 tab daily   Orders:    montelukast (SINGULAIR) 10 mg tablet; Take 1 tablet (10 mg total) by mouth daily at bedtime    Amenorrhea  Pt had tubal ligation  Urine pregnancy test is positive  Will get Bhcg blood work   Schedule ultrasound    Orders:    POCT urine HCG           History of Present Illness     Symptoms started on Friday with a scratchy throat  Has to prop herself up at night. Feels like she has phlegm in her chest   Some head congestion- started today   No fever. Dry cough  Positive pregnancy test-   Had a positive pregnancy test in July also  Pelvic floor therapy- has been helping   Periods have been irregular- stopped taking bcp about 2 years ago. Had tubal ligation.   Takes flonase and zyrtec daily     Sore Throat   Associated symptoms include coughing. Pertinent negatives include no shortness of breath.     Review of Systems   Constitutional:  Positive for fatigue. Negative for fever.   HENT:  Positive for sore throat.    Eyes: Negative.    Respiratory:  Positive for cough. Negative for shortness of breath.    Cardiovascular: Negative.    Gastrointestinal: Negative.    Endocrine: Negative.    Genitourinary: Negative.    Musculoskeletal: Negative.    Skin: Negative.    Allergic/Immunologic: Negative.    Neurological: Negative.    Psychiatric/Behavioral: Negative.         Objective   /70   Pulse 78   Temp 97.9 °F (36.6 °C) (Tympanic)   Ht 5' 2\" (1.575 m)   Wt 66.7 kg (147 lb)   SpO2 98%   BMI 26.89 kg/m²      Physical Exam  Vitals and nursing note reviewed.   Constitutional:       Appearance: She is well-developed.   HENT:      Head: Normocephalic and atraumatic.      Right Ear: " Tympanic membrane normal.      Left Ear: Tympanic membrane normal.      Nose: Congestion present.      Mouth/Throat:      Pharynx: No oropharyngeal exudate or posterior oropharyngeal erythema.   Cardiovascular:      Rate and Rhythm: Normal rate and regular rhythm.      Heart sounds: Murmur heard.   Pulmonary:      Effort: Pulmonary effort is normal.      Breath sounds: Normal breath sounds.   Abdominal:      General: Bowel sounds are normal.      Palpations: Abdomen is soft.   Skin:     General: Skin is warm and dry.   Neurological:      Mental Status: She is alert and oriented to person, place, and time.   Psychiatric:         Behavior: Behavior normal.         Thought Content: Thought content normal.         Judgment: Judgment normal.

## 2024-12-12 ENCOUNTER — TELEPHONE (OUTPATIENT)
Age: 44
End: 2024-12-12

## 2024-12-12 DIAGNOSIS — Z98.51 S/P TUBAL LIGATION: ICD-10-CM

## 2024-12-12 DIAGNOSIS — Z32.01 POSITIVE URINE PREGNANCY TEST: Primary | ICD-10-CM

## 2024-12-12 NOTE — TELEPHONE ENCOUNTER
Patient would like provider to give her a call as she is concerned with the positive pregnancy test results, s/p tubal ligation. Please call patient to discuss concerns thank you

## 2024-12-13 ENCOUNTER — RESULTS FOLLOW-UP (OUTPATIENT)
Dept: FAMILY MEDICINE CLINIC | Facility: CLINIC | Age: 44
End: 2024-12-13

## 2024-12-13 LAB — HCG INTACT+B SERPL-ACNC: 6 MIU/ML

## 2024-12-13 NOTE — TELEPHONE ENCOUNTER
Pt called requesting lab results. Pt is scheduled to have U/S done 12/12/24.      Please review and advise.  Thank you

## 2024-12-15 PROBLEM — N94.89 PELVIC CONGESTION SYNDROME: Status: ACTIVE | Noted: 2019-01-08

## 2024-12-15 PROBLEM — Z23 ENCOUNTER FOR IMMUNIZATION: Status: RESOLVED | Noted: 2024-05-28 | Resolved: 2024-12-15

## 2024-12-15 PROBLEM — J30.1 NON-SEASONAL ALLERGIC RHINITIS DUE TO POLLEN: Status: ACTIVE | Noted: 2024-12-15

## 2024-12-15 PROBLEM — Z87.74 H/O MUSTARD PROCEDURE: Status: ACTIVE | Noted: 2024-08-19

## 2024-12-16 NOTE — ASSESSMENT & PLAN NOTE
Continue with zyrtec and flonase nasal spray  Add singulair 1 tab daily   Orders:    montelukast (SINGULAIR) 10 mg tablet; Take 1 tablet (10 mg total) by mouth daily at bedtime

## 2024-12-16 NOTE — ASSESSMENT & PLAN NOTE
Pt had tubal ligation  Urine pregnancy test is positive  Will get Bhcg blood work   Schedule ultrasound    Orders:    POCT urine HCG

## 2024-12-18 ENCOUNTER — NURSE TRIAGE (OUTPATIENT)
Age: 44
End: 2024-12-18

## 2024-12-18 ENCOUNTER — OFFICE VISIT (OUTPATIENT)
Dept: FAMILY MEDICINE CLINIC | Facility: CLINIC | Age: 44
End: 2024-12-18
Payer: COMMERCIAL

## 2024-12-18 VITALS
BODY MASS INDEX: 26.7 KG/M2 | DIASTOLIC BLOOD PRESSURE: 74 MMHG | SYSTOLIC BLOOD PRESSURE: 116 MMHG | TEMPERATURE: 98.2 F | HEART RATE: 84 BPM | WEIGHT: 146 LBS | OXYGEN SATURATION: 97 %

## 2024-12-18 DIAGNOSIS — J01.00 ACUTE NON-RECURRENT MAXILLARY SINUSITIS: Primary | ICD-10-CM

## 2024-12-18 PROCEDURE — 99213 OFFICE O/P EST LOW 20 MIN: CPT | Performed by: FAMILY MEDICINE

## 2024-12-18 RX ORDER — CEFUROXIME AXETIL 250 MG/1
250 TABLET ORAL EVERY 12 HOURS SCHEDULED
Qty: 20 TABLET | Refills: 0 | Status: SHIPPED | OUTPATIENT
Start: 2024-12-18 | End: 2024-12-28

## 2024-12-18 NOTE — TELEPHONE ENCOUNTER
Reason for Disposition  • Patient wants to be seen    Protocols used: Sinus Pain and Congestion-ADULT-[place]

## 2024-12-18 NOTE — TELEPHONE ENCOUNTER
"Patient was seen last week for allergic rhinitis. She has been using medicine that was recommended. She is feeling worse. She has become very congested in nose. Nose is running with green mucous. OV today 1 pm    Answer Assessment - Initial Assessment Questions  1. LOCATION: \"Where does it hurt?\"       No c/o pain  2. ONSET: \"When did the sinus pain start?\"  (e.g., hours, days)       N/A  3. SEVERITY: \"How bad is the pain?\"   (Scale 1-10; mild, moderate or severe)    - MILD (1-3): doesn't interfere with normal activities     - MODERATE (4-7): interferes with normal activities (e.g., work or school) or awakens from sleep    - SEVERE (8-10): excruciating pain and patient unable to do any normal activities         N/A  4. RECURRENT SYMPTOM: \"Have you ever had sinus problems before?\" If so, ask: \"When was the last time?\" and \"What happened that time?\"       no  5. NASAL CONGESTION: \"Is the nose blocked?\" If so, ask, \"Can you open it or must you breathe through the mouth?\"      yes  6. NASAL DISCHARGE: \"Do you have discharge from your nose?\" If so ask, \"What color?\"      green  7. FEVER: \"Do you have a fever?\" If so, ask: \"What is it, how was it measured, and when did it start?\"       no  8. OTHER SYMPTOMS: \"Do you have any other symptoms?\" (e.g., sore throat, cough, earache, difficulty breathing)      no  9. PREGNANCY: \"Is there any chance you are pregnant?\" \"When was your last menstrual period?\"      Please see chart    Protocols used: Sinus Pain and Congestion-ADULT-[place]    "

## 2024-12-21 ENCOUNTER — HOSPITAL ENCOUNTER (OUTPATIENT)
Dept: ULTRASOUND IMAGING | Facility: HOSPITAL | Age: 44
Discharge: HOME | End: 2024-12-21
Payer: COMMERCIAL

## 2024-12-21 DIAGNOSIS — Z98.51 S/P TUBAL LIGATION: ICD-10-CM

## 2024-12-21 DIAGNOSIS — Z32.01 POSITIVE URINE PREGNANCY TEST: ICD-10-CM

## 2024-12-21 PROCEDURE — 76815 OB US LIMITED FETUS(S): CPT

## 2024-12-23 ENCOUNTER — TELEPHONE (OUTPATIENT)
Age: 44
End: 2024-12-23

## 2024-12-23 NOTE — PROGRESS NOTES
Name: Morenita Jensen      : 1980      MRN: [id number]  Encounter Provider: Genet Lopez DO  Encounter Date: 2024   Encounter department: [facility]  :  Assessment & Plan  Acute non-recurrent maxillary sinusitis  Cefuroxime 1 tab twice a day   Orders:    cefuroxime (CEFTIN) 250 mg tablet; Take 1 tablet (250 mg total) by mouth every 12 (twelve) hours for 10 days          Depression Screening and Follow-up Plan: Patient was screened for depression during today's encounter. They screened negative with a PHQ-2 score of 0.      History of Present Illness     Pt is here for follow up visit from . Pt symptoms progressed, nasal congestion, thick drainage, postnasal drip. Cough. No fever. Sinus pressure. No shortness of breath.       Review of Systems   Constitutional: Negative.  Negative for fatigue and fever.   HENT:  Positive for congestion, postnasal drip, sinus pressure and sore throat.    Eyes: Negative.    Respiratory:  Positive for cough.    Cardiovascular: Negative.    Gastrointestinal: Negative.    Endocrine: Negative.    Genitourinary: Negative.    Musculoskeletal: Negative.    Skin: Negative.    Allergic/Immunologic: Negative.    Neurological: Negative.    Psychiatric/Behavioral: Negative.         Objective   /74   Pulse 84   Temp 98.2 °F (36.8 °C) (Tympanic)   Wt 66.2 kg (146 lb)   LMP 11/15/2024   SpO2 97%   BMI 26.70 kg/m²      Physical Exam  Vitals and nursing note reviewed.   Constitutional:       Appearance: She is well-developed.   HENT:      Head: Normocephalic and atraumatic.      Right Ear: Tympanic membrane normal.      Left Ear: Tympanic membrane normal.      Nose: Congestion present.      Mouth/Throat:      Pharynx: No oropharyngeal exudate or posterior oropharyngeal erythema.   Cardiovascular:      Rate and Rhythm: Normal rate and regular rhythm.      Heart sounds: Murmur heard.   Pulmonary:      Effort: Pulmonary effort is normal.      Breath  sounds: Normal breath sounds.   Abdominal:      General: Bowel sounds are normal.      Palpations: Abdomen is soft.   Skin:     General: Skin is warm and dry.   Neurological:      Mental Status: She is alert and oriented to person, place, and time.   Psychiatric:         Behavior: Behavior normal.         Thought Content: Thought content normal.         Judgment: Judgment normal.

## 2024-12-23 NOTE — TELEPHONE ENCOUNTER
Confirmation - Referral [id number]  Effective: 12/23/2024     Expires: 03/23/2025  Active  Referred From  [facility]  Specialty: Continuing Care residential Center  Group NPI: [id number]  Provider ID: [id number]  Tax ID: [id number]  [address] [place] [address]  Referred To  [facility] CARDIOLOGY  Specialty: Multi-Specialty Group  Group NPI: [id number]  Provider ID: [id number]  Tax ID: [id number]  This referral is valid at any location for the above group  Patient Info  CHATO RUIZ  [id number]  Female  1980  [address] [place] [address]  Clinical Information  Place of Service  Office  Service Type  Medical Care  Diagnosis  1Z95.0 - presence of cardiac pacemaker  Procedures  400498 - interrogation device evaluation(s) (remote), up to 90 days; single, dual, or multiple lead pacemaker system, or leadless pacemaker system with interim analysis, review(s) and report(s) by a physician or other qualified health care kirk nal  892427 - interrogation device evaluation(s) (remote), up to 90 days; single, dual, or multiple lead implantable defibrillator system with interim analysis, review(s) and report(s) by a physician or other qualified health care professional  992015 - interrogation device evaluation(s), (remote) up to 30 days; subcutaneous cardiac rhythm monitor system, including analysis of recorded heart rhythm data, analysis, review(s) and report(s) by a physician or other qualified health care professional

## 2024-12-23 NOTE — TELEPHONE ENCOUNTER
Patient is requesting an insurance referral for the following specialty:      Test Name / Order Name: cardiology    DX Code: Z95.0  Procedure codes: 49174, 33679, 87443    Date Of Service: 12/30/24    Location/Facility Name/Address/Phone #: [facility] cardiology division # [phone]    Location / Facility NPI: [id number]    [facility] Phone # To Reach The Patient:  [phone]

## 2024-12-23 NOTE — ASSESSMENT & PLAN NOTE
Cefuroxime 1 tab twice a day   Orders:    cefuroxime (CEFTIN) 250 mg tablet; Take 1 tablet (250 mg total) by mouth every 12 (twelve) hours for 10 days

## 2024-12-26 ENCOUNTER — TELEPHONE (OUTPATIENT)
Age: 44
End: 2024-12-26

## 2024-12-26 NOTE — TELEPHONE ENCOUNTER
Patient called to report she has two days left of current ceftin abx course, she is still having the left ear crackling, left nasal passage still clogged, however the mucus is improving in color going from green to almost clear. At this time patient is not sure if she should continue to monitor and give more time or if she should have another round of abx treatment, please consult with provider and follow up with patient thank you

## 2024-12-31 ENCOUNTER — APPOINTMENT (EMERGENCY)
Dept: CT IMAGING | Facility: HOSPITAL | Age: 44
End: 2024-12-31
Payer: COMMERCIAL

## 2024-12-31 ENCOUNTER — HOSPITAL ENCOUNTER (EMERGENCY)
Facility: HOSPITAL | Age: 44
Discharge: HOME | End: 2024-12-31
Attending: EMERGENCY MEDICINE
Payer: COMMERCIAL

## 2024-12-31 ENCOUNTER — APPOINTMENT (EMERGENCY)
Dept: ULTRASOUND IMAGING | Facility: HOSPITAL | Age: 44
End: 2024-12-31
Payer: COMMERCIAL

## 2024-12-31 VITALS
HEIGHT: 62 IN | HEART RATE: 98 BPM | OXYGEN SATURATION: 96 % | WEIGHT: 146 LBS | DIASTOLIC BLOOD PRESSURE: 60 MMHG | RESPIRATION RATE: 16 BRPM | BODY MASS INDEX: 26.87 KG/M2 | TEMPERATURE: 98 F | SYSTOLIC BLOOD PRESSURE: 123 MMHG

## 2024-12-31 DIAGNOSIS — R10.9 ABDOMINAL PAIN: Primary | ICD-10-CM

## 2024-12-31 DIAGNOSIS — R79.89 ELEVATED SERUM HCG: ICD-10-CM

## 2024-12-31 LAB
ALBUMIN SERPL BCG-MCNC: 4.7 G/DL (ref 3.5–5)
ALP SERPL-CCNC: 56 U/L (ref 34–104)
ALT SERPL W P-5'-P-CCNC: 22 U/L (ref 7–52)
ANION GAP SERPL CALCULATED.3IONS-SCNC: 5 MMOL/L (ref 4–13)
AST SERPL W P-5'-P-CCNC: 21 U/L (ref 13–39)
B-HCG SERPL-ACNC: 8.5 MIU/ML (ref 0–5)
BACTERIA UR QL AUTO: NORMAL /HPF
BASOPHILS # BLD AUTO: 0.01 THOUSANDS/ΜL (ref 0–0.1)
BASOPHILS NFR BLD AUTO: 0 % (ref 0–1)
BILIRUB SERPL-MCNC: 1.78 MG/DL (ref 0.2–1)
BILIRUB UR QL STRIP: NEGATIVE
BUN SERPL-MCNC: 10 MG/DL (ref 5–25)
CALCIUM SERPL-MCNC: 9.4 MG/DL (ref 8.4–10.2)
CHLORIDE SERPL-SCNC: 106 MMOL/L (ref 96–108)
CLARITY UR: CLEAR
CO2 SERPL-SCNC: 29 MMOL/L (ref 21–32)
COLOR UR: ABNORMAL
CREAT SERPL-MCNC: 0.63 MG/DL (ref 0.6–1.3)
EOSINOPHIL # BLD AUTO: 0.25 THOUSAND/ΜL (ref 0–0.61)
EOSINOPHIL NFR BLD AUTO: 4 % (ref 0–6)
ERYTHROCYTE [DISTWIDTH] IN BLOOD BY AUTOMATED COUNT: 12.7 % (ref 11.6–15.1)
GFR SERPL CREATININE-BSD FRML MDRD: 110 ML/MIN/1.73SQ M
GLUCOSE SERPL-MCNC: 101 MG/DL (ref 65–140)
GLUCOSE UR STRIP-MCNC: NEGATIVE MG/DL
HCT VFR BLD AUTO: 40.1 % (ref 34.8–46.1)
HGB BLD-MCNC: 13.1 G/DL (ref 11.5–15.4)
HGB UR QL STRIP.AUTO: ABNORMAL
IMM GRANULOCYTES # BLD AUTO: 0.03 THOUSAND/UL (ref 0–0.2)
IMM GRANULOCYTES NFR BLD AUTO: 1 % (ref 0–2)
KETONES UR STRIP-MCNC: NEGATIVE MG/DL
LEUKOCYTE ESTERASE UR QL STRIP: NEGATIVE
LYMPHOCYTES # BLD AUTO: 1.68 THOUSANDS/ΜL (ref 0.6–4.47)
LYMPHOCYTES NFR BLD AUTO: 26 % (ref 14–44)
MCH RBC QN AUTO: 28.9 PG (ref 26.8–34.3)
MCHC RBC AUTO-ENTMCNC: 32.7 G/DL (ref 31.4–37.4)
MCV RBC AUTO: 89 FL (ref 82–98)
MONOCYTES # BLD AUTO: 0.66 THOUSAND/ΜL (ref 0.17–1.22)
MONOCYTES NFR BLD AUTO: 10 % (ref 4–12)
NEUTROPHILS # BLD AUTO: 3.85 THOUSANDS/ΜL (ref 1.85–7.62)
NEUTS SEG NFR BLD AUTO: 59 % (ref 43–75)
NITRITE UR QL STRIP: NEGATIVE
NON-SQ EPI CELLS URNS QL MICRO: NORMAL /HPF
NRBC BLD AUTO-RTO: 0 /100 WBCS
PH UR STRIP.AUTO: 7.5 [PH]
PLATELET # BLD AUTO: 223 THOUSANDS/UL (ref 149–390)
PMV BLD AUTO: 10.1 FL (ref 8.9–12.7)
POTASSIUM SERPL-SCNC: 3.8 MMOL/L (ref 3.5–5.3)
PROT SERPL-MCNC: 7.7 G/DL (ref 6.4–8.4)
PROT UR STRIP-MCNC: NEGATIVE MG/DL
RBC # BLD AUTO: 4.53 MILLION/UL (ref 3.81–5.12)
RBC #/AREA URNS AUTO: NORMAL /HPF
SODIUM SERPL-SCNC: 140 MMOL/L (ref 135–147)
SP GR UR STRIP.AUTO: <1.005 (ref 1–1.03)
UROBILINOGEN UR STRIP-ACNC: <2 MG/DL
WBC # BLD AUTO: 6.48 THOUSAND/UL (ref 4.31–10.16)
WBC #/AREA URNS AUTO: NORMAL /HPF

## 2024-12-31 PROCEDURE — 74177 CT ABD & PELVIS W/CONTRAST: CPT

## 2024-12-31 PROCEDURE — 36415 COLL VENOUS BLD VENIPUNCTURE: CPT

## 2024-12-31 PROCEDURE — 99285 EMERGENCY DEPT VISIT HI MDM: CPT | Performed by: EMERGENCY MEDICINE

## 2024-12-31 PROCEDURE — 81001 URINALYSIS AUTO W/SCOPE: CPT | Performed by: EMERGENCY MEDICINE

## 2024-12-31 PROCEDURE — 76815 OB US LIMITED FETUS(S): CPT

## 2024-12-31 PROCEDURE — 99284 EMERGENCY DEPT VISIT MOD MDM: CPT

## 2024-12-31 PROCEDURE — 80053 COMPREHEN METABOLIC PANEL: CPT | Performed by: EMERGENCY MEDICINE

## 2024-12-31 PROCEDURE — 85025 COMPLETE CBC W/AUTO DIFF WBC: CPT | Performed by: EMERGENCY MEDICINE

## 2024-12-31 PROCEDURE — 84702 CHORIONIC GONADOTROPIN TEST: CPT | Performed by: EMERGENCY MEDICINE

## 2024-12-31 RX ADMIN — IOHEXOL 100 ML: 350 INJECTION, SOLUTION INTRAVENOUS at 20:35

## 2025-01-01 NOTE — ED PROVIDER NOTES
Time reflects when diagnosis was documented in both MDM as applicable and the Disposition within this note       Time User Action Codes Description Comment    12/31/2024  9:12 PM Eliot Sanchez Add [R10.9] Abdominal pain     12/31/2024  9:12 PM Eliot Sanchez Add [R79.89] Elevated serum hCG     12/31/2024  9:22 PM Eliot Sanchez Modify [R10.9] Abdominal pain     12/31/2024  9:22 PM Eliot Sanchez Modify [R79.89] Elevated serum hCG           ED Disposition       ED Disposition   Discharge    Condition   Stable    Date/Time   Tue Dec 31, 2024  9:12 PM    Comment   Morenita [facility] discharge to home/self care.                   Assessment & Plan       Medical Decision Making  44-year-old female presenting with continued abdominal pain.  Repeat hCG 8.5.  Chart reviewed with recent imaging and also hCG.  Discussed differential diagnosis with patient.  Will repeat ultrasound to assess for ectopic pregnancy.  Also discussed case with OB/GYN team.    Given patient's history of tubal ligation and hCG level remaining very low, nonviable pregnancy, we will proceed with CT scan to rule out other etiologies of patient's abdominal pain.    Discussed all findings with patient and also with OB team.  Patient has appointment with OB in a few days.  Return precautions discussed.    Amount and/or Complexity of Data Reviewed  Labs: ordered.  Radiology: ordered.    Risk  Prescription drug management.             Medications   iohexol (OMNIPAQUE) 350 MG/ML injection (MULTI-DOSE) 100 mL (100 mL Intravenous Given 12/31/24 2035)       ED Risk Strat Scores                                              History of Present Illness       Chief Complaint   Patient presents with    Abdominal Pain     Umbilical pain since 12/9 has seens PCP and GYN, has had tubes tied for 23 years and had pos preg test on 12/11, had US 12/21 and PCP told pt to follow up       Past Medical History:   Diagnosis Date    Acute respiratory failure with hypoxia (HCC)  02/27/2024    Allergic     Atrial fibrillation (HCC)     last assessed 03/14/2014    BRCA1 negative     BRCA2 negative     Complete transposition of great vessels     last assessed 02/14/2014    Coronary artery disease     Eczema     GERD (gastroesophageal reflux disease)     Heart murmur     History of cardiac cath     Cath RVOT subpulmonary obstruction, last assessed 06/19/2012    Hyperbilirubinemia (congenital)     Migraine     Otitis media     SSS (sick sinus syndrome) (HCC)     Torsion of small intestine (HCC)       Past Surgical History:   Procedure Laterality Date    ADENOIDECTOMY      onset childhood    ATRIAL SEPTECTOMY      balloon transvenous method    BREAST BIOPSY Right     2019 benign    CARDIAC CATHETERIZATION  02/20/2024    CARDIAC PACEMAKER PLACEMENT      last assessed 03/14/2014    LAPAROSCOPIC LYSIS INTESTINAL ADHESIONS      OOPHORECTOMY      OTHER SURGICAL HISTORY      transposition repair mustard procedure, description 11 months    TONSILLECTOMY      TUBAL LIGATION      last assessed 08/17/2012    US GUIDED BREAST BIOPSY RIGHT COMPLETE Right 11/18/2019      Family History   Problem Relation Age of Onset    Cancer Father 63        hodgkins lymphoma     Melanoma Father     Colon cancer Sister 22        carcinoid tumor in the rectum    Heart disease Maternal Grandmother     Hyperlipidemia Maternal Grandmother     Diabetes Paternal Grandmother     Hypertension Paternal Grandmother     Lung cancer Paternal Grandmother     Heart disease Paternal Grandfather     Hyperlipidemia Paternal Grandfather     Lung cancer Paternal Grandfather     No Known Problems Mother     No Known Problems Daughter     Cancer Maternal Grandfather         tongue    Breast cancer Paternal Aunt 52    Ovarian cancer Paternal Aunt 30    No Known Problems Paternal Aunt     No Known Problems Maternal Aunt       Social History     Tobacco Use    Smoking status: Never    Smokeless tobacco: Never   Vaping Use    Vaping status: Never  Used   Substance Use Topics    Alcohol use: Yes     Comment: social    Drug use: No      E-Cigarette/Vaping    E-Cigarette Use Never User       E-Cigarette/Vaping Substances    Nicotine No     THC No     CBD No     Flavoring No     Other No     Unknown No       I have reviewed and agree with the history as documented.     44-year-old female presents for evaluation of periumbilical abdominal pain started a few weeks ago.  Patient initially evaluated by PCP as she was late for her menstrual cycle and had at home urine positive pregnancy test.  PCP ordered hCG which was found to be 6 and also a pelvic ultrasound which was performed a few days ago.  Patient presents to the ED today because pain has persisted.  Patient reports a complex past surgical history.  Normal bowel movements.        Review of Systems   Constitutional:  Negative for fever.   Gastrointestinal:  Positive for abdominal pain.           Objective       ED Triage Vitals [12/31/24 1318]   Temperature Pulse Blood Pressure Respirations SpO2 Patient Position - Orthostatic VS   98 °F (36.7 °C) 75 135/77 16 94 % Sitting      Temp Source Heart Rate Source BP Location FiO2 (%) Pain Score    Oral Monitor Left arm -- 5      Vitals      Date and Time Temp Pulse SpO2 Resp BP Pain Score FACES Pain Rating User   12/31/24 2100 -- 98 96 % 16 123/60 -- -- AD   12/31/24 1318 98 °F (36.7 °C) 75 94 % 16 135/77 5 -- MS            Physical Exam  Vitals and nursing note reviewed.   Constitutional:       Appearance: She is well-developed.   HENT:      Head: Normocephalic and atraumatic.      Right Ear: External ear normal.      Left Ear: External ear normal.      Nose: Nose normal.   Eyes:      General: No scleral icterus.  Cardiovascular:      Rate and Rhythm: Normal rate.   Pulmonary:      Effort: Pulmonary effort is normal. No respiratory distress.   Abdominal:      General: There is no distension.      Tenderness: There is abdominal tenderness in the periumbilical area.    Musculoskeletal:         General: No deformity. Normal range of motion.      Cervical back: Normal range of motion.   Skin:     Findings: No rash.   Neurological:      General: No focal deficit present.      Mental Status: She is alert and oriented to person, place, and time.   Psychiatric:         Mood and Affect: Mood normal.         Results Reviewed       Procedure Component Value Units Date/Time    hCG, quantitative, pregnancy [[id number]]  (Abnormal) Collected: 12/31/24 1321    Lab Status: Final result Specimen: Blood from Arm, Left Updated: 12/31/24 1417     HCG, Quant 8.5 mIU/mL     Narrative:       Expected Ranges:    HCG results between 5.0 and 25.0 mIU/mL may be indicative of early pregnancy but should be interpreted in light of the total clinical presentation.    HCG can rise to detectable levels in williams and post menopausal women (0-11.6 mIU/mL).     Approximate               Approximate HCG  Gestation age          Concentration ( mIU/mL)  _____________          ______________________   Weeks                      HCG values  0.2-1                       5-50  1-2                           2-3                         100-5000  3-4                         500-02230  4-5                         1000-67666  5-6                         [id number]  6-8                         [id number]  8-12                        [id number]      Urine Microscopic [[id number]]  (Normal) Collected: 12/31/24 1336    Lab Status: Final result Specimen: Urine, Clean Catch Updated: 12/31/24 1405     RBC, UA None Seen /hpf      WBC, UA 2-4 /hpf      Epithelial Cells Occasional /hpf      Bacteria, UA Occasional /hpf     UA w Reflex to Microscopic w Reflex to Culture [[id number]]  (Abnormal) Collected: 12/31/24 1336    Lab Status: Final result Specimen: Urine, Clean Catch Updated: 12/31/24 1354     Color, UA Light Yellow     Clarity, UA Clear     Specific Gravity, UA <1.005     pH, UA 7.5     Leukocytes, UA Negative      Nitrite, UA Negative     Protein, UA Negative mg/dl      Glucose, UA Negative mg/dl      Ketones, UA Negative mg/dl      Urobilinogen, UA <2.0 mg/dl      Bilirubin, UA Negative     Occult Blood, UA Small    Comprehensive metabolic panel [[id number]]  (Abnormal) Collected: 12/31/24 1321    Lab Status: Final result Specimen: Blood from Arm, Left Updated: 12/31/24 1350     Sodium 140 mmol/L      Potassium 3.8 mmol/L      Chloride 106 mmol/L      CO2 29 mmol/L      ANION GAP 5 mmol/L      BUN 10 mg/dL      Creatinine 0.63 mg/dL      Glucose 101 mg/dL      Calcium 9.4 mg/dL      AST 21 U/L      ALT 22 U/L      Alkaline Phosphatase 56 U/L      Total Protein 7.7 g/dL      Albumin 4.7 g/dL      Total Bilirubin 1.78 mg/dL      eGFR 110 ml/min/1.73sq m     Narrative:      National Kidney Disease Foundation guidelines for Chronic Kidney Disease (CKD):     Stage 1 with normal or high GFR (GFR > 90 mL/min/1.73 square meters)    Stage 2 Mild CKD (GFR = 60-89 mL/min/1.73 square meters)    Stage 3A Moderate CKD (GFR = 45-59 mL/min/1.73 square meters)    Stage 3B Moderate CKD (GFR = 30-44 mL/min/1.73 square meters)    Stage 4 Severe CKD (GFR = 15-29 mL/min/1.73 square meters)    Stage 5 End Stage CKD (GFR <15 mL/min/1.73 square meters)  Note: GFR calculation is accurate only with a steady state creatinine    CBC and differential [[id number]] Collected: 12/31/24 1321    Lab Status: Final result Specimen: Blood from Arm, Left Updated: 12/31/24 1331     WBC 6.48 Thousand/uL      RBC 4.53 Million/uL      Hemoglobin 13.1 g/dL      Hematocrit 40.1 %      MCV 89 fL      MCH 28.9 pg      MCHC 32.7 g/dL      RDW 12.7 %      MPV 10.1 fL      Platelets 223 Thousands/uL      nRBC 0 /100 WBCs      Segmented % 59 %      Immature Grans % 1 %      Lymphocytes % 26 %      Monocytes % 10 %      Eosinophils Relative 4 %      Basophils Relative 0 %      Absolute Neutrophils 3.85 Thousands/µL      Absolute Immature Grans 0.03 Thousand/uL      Absolute  Lymphocytes 1.68 Thousands/µL      Absolute Monocytes 0.66 Thousand/µL      Eosinophils Absolute 0.25 Thousand/µL      Basophils Absolute 0.01 Thousands/µL             CT abdomen pelvis with contrast   Final Interpretation by Cornelio Chowdhury MD ([id number])      No acute findings in the abdomen or pelvis.      Stable prominent right adnexal vessels which can be seen in the setting of pelvic congestion syndrome.               Workstation performed: [id number]         US OB pregnancy limited with transvaginal   Final Interpretation by Bjorn Gonzalez DO ([id number])   No intrauterine gestation or adnexal mass identified. Beta-hCG level only 8.5. Was reportedly 6 in ?   Differential remains early IUP, spontaneous  and ectopic pregnancy.  Correlate with serial quantitative BHCG.            Workstation performed: [id number]             Procedures    ED Medication and Procedure Management   Prior to Admission Medications   Prescriptions Last Dose Informant Patient Reported? Taking?   Cholecalciferol 25 MCG (1000 UT) tablet  Self Yes No   Sig: Take 1,000 Units by mouth daily   Cyanocobalamin (VITAMIN B 12 PO)  Self Yes No   Sig: Take by mouth   ELDERBERRY PO  Self Yes No   Sig: Take by mouth   MULTIPLE VITAMINS-MINERALS PO  Self Yes No   Sig: Take 1 tablet by mouth daily   ascorbic acid (VITAMIN C) 500 mg tablet  Self Yes No   Sig: Take by mouth   aspirin (ECOTRIN LOW STRENGTH) 81 mg EC tablet  Self Yes No   Sig: Take 81 mg by mouth daily   cetirizine (ZyrTEC) 10 mg tablet  Self Yes No   Sig: Take 10 mg by mouth daily   furosemide (LASIX) 20 mg tablet   Yes No   Sig: TAKE ONE TABLET BY MOUTH WEEKLY AND AS NEEDED   meclizine (ANTIVERT) 25 mg tablet  Self No No   Sig: Take 1 tablet (25 mg total) by mouth every 8 (eight) hours as needed for dizziness   metoprolol succinate (TOPROL-XL) 25 mg 24 hr tablet  Self Yes No   Sig: Take 25 mg by mouth daily   montelukast (SINGULAIR) 10 mg tablet   No No   Sig: Take 1  tablet (10 mg total) by mouth daily at bedtime   sotalol (BETAPACE) 120 mg tablet  Self Yes No   Sig: Take 1 tablet by mouth 2 (two) times a day      Facility-Administered Medications: None     Discharge Medication List as of 12/31/2024  9:23 PM        CONTINUE these medications which have NOT CHANGED    Details   ascorbic acid (VITAMIN C) 500 mg tablet Take by mouth, Historical Med      aspirin (ECOTRIN LOW STRENGTH) 81 mg EC tablet Take 81 mg by mouth daily, Starting Tue 8/20/2013, Historical Med      cetirizine (ZyrTEC) 10 mg tablet Take 10 mg by mouth daily, Historical Med      Cholecalciferol 25 MCG (1000 UT) tablet Take 1,000 Units by mouth daily, Historical Med      Cyanocobalamin (VITAMIN B 12 PO) Take by mouth, Historical Med      ELDERBERRY PO Take by mouth, Historical Med      furosemide (LASIX) 20 mg tablet TAKE ONE TABLET BY MOUTH WEEKLY AND AS NEEDED, Historical Med      meclizine (ANTIVERT) 25 mg tablet Take 1 tablet (25 mg total) by mouth every 8 (eight) hours as needed for dizziness, Starting Fri 12/2/2022, Normal      metoprolol succinate (TOPROL-XL) 25 mg 24 hr tablet Take 25 mg by mouth daily, Starting Fri 11/12/2021, Historical Med      montelukast (SINGULAIR) 10 mg tablet Take 1 tablet (10 mg total) by mouth daily at bedtime, Starting Wed 12/11/2024, Normal      MULTIPLE VITAMINS-MINERALS PO Take 1 tablet by mouth daily, Historical Med      sotalol (BETAPACE) 120 mg tablet Take 1 tablet by mouth 2 (two) times a day, Starting Sat 10/13/2018, Historical Med             ED SEPSIS DOCUMENTATION   Time reflects when diagnosis was documented in both MDM as applicable and the Disposition within this note       Time User Action Codes Description Comment    12/31/2024  9:12 PM Eliot Sanchez Add [R10.9] Abdominal pain     12/31/2024  9:12 PM Eliot Sanchez Add [R79.89] Elevated serum hCG     12/31/2024  9:22 PM Eliot Sanchez Modify [R10.9] Abdominal pain     12/31/2024  9:22 PM Eliot Sanchez Modify  [R79.89] Elevated serum hCG                  Eliot Sanchez DO  01/03/25 1640

## 2025-01-01 NOTE — DISCHARGE INSTRUCTIONS
IMPRESSION:     No acute findings in the abdomen or pelvis.     Stable prominent right adnexal vessels which can be seen in the setting of pelvic congestion syndrome.

## 2025-01-08 ENCOUNTER — OFFICE VISIT (OUTPATIENT)
Age: 45
End: 2025-01-08
Payer: COMMERCIAL

## 2025-01-08 VITALS
SYSTOLIC BLOOD PRESSURE: 122 MMHG | WEIGHT: 147 LBS | DIASTOLIC BLOOD PRESSURE: 70 MMHG | HEIGHT: 62 IN | BODY MASS INDEX: 27.05 KG/M2

## 2025-01-08 DIAGNOSIS — Z80.3 FAMILY HISTORY OF BREAST CANCER: ICD-10-CM

## 2025-01-08 DIAGNOSIS — Z32.01 POSITIVE PREGNANCY TEST: ICD-10-CM

## 2025-01-08 DIAGNOSIS — R68.89 ABNORMAL ENDOCRINE LABORATORY TEST FINDING: Primary | ICD-10-CM

## 2025-01-08 DIAGNOSIS — R79.89 ELEVATED SERUM HCG: ICD-10-CM

## 2025-01-08 DIAGNOSIS — Z80.41 FAMILY HISTORY OF OVARIAN CANCER: ICD-10-CM

## 2025-01-08 PROCEDURE — 99204 OFFICE O/P NEW MOD 45 MIN: CPT | Performed by: OBSTETRICS & GYNECOLOGY

## 2025-01-08 PROCEDURE — 81025 URINE PREGNANCY TEST: CPT | Performed by: OBSTETRICS & GYNECOLOGY

## 2025-01-08 NOTE — PATIENT INSTRUCTIONS
Persistent + beta HCG <10 x 6mos. Faint +UPT in office today.   For heterophilic Ab testing.   Family h/o breast and ovarian CA with also same h/o with + beta hCG:  for CA-125 and also for GynOnc eConsult.   Check genetic testing results, and upload to [email or web address] portal if possible.  eConsult to GynOnc requested.    Await results and recommendations from GynOnc.   Patient verbalized understanding of today's discussion with all questions and concerns addressed to her satisfaction.

## 2025-01-08 NOTE — PROGRESS NOTES
What we talked about today:    Patient Instructions   Persistent + beta HCG <10 x 6mos. Faint +UPT in office today.   For heterophilic Ab testing.   Family h/o breast and ovarian CA with also same h/o with + beta hCG:  for CA-125 and also for GynOnc eConsult.   Check genetic testing results, and upload to [facility] if possible.  eConsult to GynOnc requested.    Await results and recommendations from GynOnc.   Patient verbalized understanding of today's discussion with all questions and concerns addressed to her satisfaction.            Assessment/Plan:    1. Abnormal endocrine laboratory test finding  -     ; Future  -     TSH + Free T4; Future  -       -     TSH + Free T4  -     MISCELLANEOUS LAB TEST; Future  -     MISCELLANEOUS LAB TEST  -     Ambulatory Referral to Gynecologic Oncology; Future  2. Elevated serum hCG  -     Ambulatory Referral to Obstetrics / Gynecology  -     ; Future  -     TSH + Free T4; Future  -       -     TSH + Free T4  -     Ambulatory Referral to Gynecologic Oncology; Future  3. Positive pregnancy test  -     POCT urine HCG  4. Family history of breast cancer  5. Family history of ovarian cancer          Subjective:      Patient ID: Morenita Jensen is a 44 y.o. female, presents today complaining of continued positive pregnancy test.      HPI:    Pt states had pelvic pain on 12/31 so went back to the ER and had repeat u/s and CT scan that failed to show + Pregnancy.  H/o SBO in 2012 where she had surgery and was able to fix SBO w/o removing part of the bowel.   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  Pt had open heart sx 11/1983 then 12/2005 for transposition of great vessels. Pt had pacemaker placed in 4/1999 and replaced 10/2014, and has 22 mos left before needing replacement.      No issues w/ intercourse.      Had BTL 12/2001.  No period since 11/15-11/19/2024.  Had pos pregnancy test  over the summer and had u/s and was told no baby.      24- beta was 3  TSH- 0.744  24- beta was <1  24- + UPT  24- 6  24- 8.5    Pt states she gets random coughs only during intercourse when she is on the bottom.  In 2023, ended up in the ER as was having SOB and chest pains lasting several hours.  2024, happened again and cardiologist had her do a pregnancy test and was + home pregnancy test.      Pt normally sees [facility] and sees Lona Hopper NP.  Pt feels her concerns were dismissed.  Pt concerned that she feels more bloated in the abdomen over the past month.      Pt states she had genetic testing (thinks with [facility]) done as well and was told neg for BRCA.   Had mammograms since 2019 for fibroadenoma and dense breast tissue with TC=17% and had u/s.      Pat aunt with breast and ovarian CA.  Per pt, she had + beta hCG BEFORE she was diagnosed ovarian CA.  No cancer in mom.  Sister had carcinoid rectal tumor at age 22. Was fine after resection, w/o chemo or radiation.     24 u/s showed:  FINDINGS:     UTERUS:  The uterus is anteverted in position, measuring 7.3 x 3.3 x 5.0 cm.  Contour and echotexture appear normal.  The cervix is closed and within normal limits.     ENDOMETRIUM:  Endometrial stripe measures 4.0 mm.  No evidence of intrauterine gestation.     OVARIES/ADNEXA:  No adnexal mass evident.  There is no free fluid.     Right ovary:  2.1 x 1.2 x 1.5 cm. Volume 2.1  Doppler flow present.  No suspicious abnormality.     Left ovary:  2.7 x 2.8 x 1.5 cm.  Volume  5.9  Doppler flow present. Simple cystic structure measuring up to 2.0 cm in keeping with a dominant follicle.  No suspicious abnormality.     IMPRESSION:     No intrauterine gestation or adnexal mass identified.     Differential remains early IUP, spontaneous  and ectopic pregnancy.  Correlate with serial quantitative bHCG.     The following portions of the patient's history were reviewed and updated as  appropriate: allergies, current medications, past family history, past medical history, past social history, past surgical history, and problem list.      24 u/s showed:   FINDINGS:     UTERUS:  The uterus is anteverted in position, measuring 5.8 x 2.1 x 3.5 cm.  Contour and echotexture appear normal.  The cervix is closed and within normal limits.     ENDOMETRIUM:  Endometrial stripe measures 5 mm.  No evidence of intrauterine gestation.     OVARIES/ADNEXA:  No adnexal mass evident.  There is no free fluid.     Right ovary: Not visualized.     Left ovary: 2.8 x 1.2 x 2.7 cm.  Doppler flow present.  1.6 cm dominant follicle.     No suspicious abnormality.        IMPRESSION:  No intrauterine gestation or adnexal mass identified. Beta-hCG level only 8.5. Was reportedly 6 in ?  Differential remains early IUP, spontaneous  and ectopic pregnancy.  Correlate with serial quantitative BHCG.    Had CT abd 24:   FINDINGS:     ABDOMEN     LOWER CHEST: There are postoperative changes of the visualized heart in this patient with a history of transposition of the great vessels. The visualized lung bases are clear.     LIVER/BILIARY TREE: Unremarkable.     GALLBLADDER: No calcified gallstones. No pericholecystic inflammatory change.     SPLEEN: Unremarkable.     PANCREAS: Unremarkable.     ADRENAL GLANDS: Unremarkable.     KIDNEYS/URETERS: Unremarkable. No hydronephrosis.     STOMACH AND BOWEL: Unremarkable.     APPENDIX: No findings to suggest appendicitis.     ABDOMINOPELVIC CAVITY: No ascites. No pneumoperitoneum. Again noted are few nonspecific prominent to mildly enlarged left para-aortic lymph nodes largest measuring 16 mm in short axis, not significantly changed from the prior exam (series 2 image 87).   This is also stable dating back to a CT abdomen pelvis performed on May 11, 2013. Multiple periaortic surgical clips are also present.     VESSELS: No abdominal aortic aneurysm.     PELVIS    "  REPRODUCTIVE ORGANS: Reidentified multiple prominent right adnexal vessels, similar to the prior studies, which can be seen in the setting of pelvic congestion syndrome. This was also described on a pelvic ultrasound dated July 31, 2024.     URINARY BLADDER: Unremarkable.     ABDOMINAL WALL/INGUINAL REGIONS: Pacer generator again seen in the left anterior abdominal wall.     BONES: No acute fracture or suspicious osseous lesion.     IMPRESSION:     No acute findings in the abdomen or pelvis.     Stable prominent right adnexal vessels which can be seen in the setting of pelvic congestion syndrome.      Review of Systems   Constitutional:  Negative for chills, fatigue and fever.   Respiratory: Negative.     Cardiovascular: Negative.    Gastrointestinal: Negative.    Endocrine: Negative.    Genitourinary: Negative.    Musculoskeletal: Negative.    Skin: Negative.    Allergic/Immunologic: Negative.    Neurological: Negative.    Hematological: Negative.    Psychiatric/Behavioral: Negative.               Objective:      /70 (BP Location: Left arm, Patient Position: Sitting, Cuff Size: Standard)   Ht 5' 1.5\" (1.562 m)   Wt 66.7 kg (147 lb)   LMP 11/15/2024   BMI 27.33 kg/m²        Physical Exam  Vitals reviewed.   Constitutional:       General: She is not in acute distress.     Appearance: Normal appearance. She is not ill-appearing.   HENT:      Head: Normocephalic and atraumatic.   Eyes:      Extraocular Movements: Extraocular movements intact.   Musculoskeletal:      Cervical back: Normal range of motion.   Skin:     General: Skin is warm and dry.   Neurological:      Mental Status: She is alert.   Psychiatric:         Mood and Affect: Mood normal.         Behavior: Behavior normal.         Thought Content: Thought content normal.         Judgment: Judgment normal.           GYN exam: deferred.     Wetprep was not performed today.        DAVID Daily MD, FACOG  "

## 2025-01-09 LAB — SL AMB POCT URINE HCG: NORMAL

## 2025-01-14 ENCOUNTER — DOCUMENTATION (OUTPATIENT)
Dept: HEMATOLOGY ONCOLOGY | Facility: CLINIC | Age: 45
End: 2025-01-14

## 2025-01-14 ENCOUNTER — PATIENT MESSAGE (OUTPATIENT)
Age: 45
End: 2025-01-14

## 2025-01-14 NOTE — PROGRESS NOTES
"Chart rvw'd on 2025      Referred by:  Dr. Raiza Daily    Diagnosis:  Elevated serum hCG  Abnormal endocrine laboratory test finding    Imagin2024 CT AP w contrast-  No acute findings in the abdomen or pelvis.     Stable prominent right adnexal vessels which can be seen in the setting of pelvic congestion syndrome.    2024 US OB pregnancy limited with transvaginal-  No intrauterine gestation or adnexal mass identified. Beta-hCG level only 8.5. Was reportedly 6 in ?  Differential remains early IUP, spontaneous  and ectopic pregnancy.  Correlate with serial quantitative BHCG.      Pathology:  N/A      Scheduled appointments:  N/A    Surgery:  N/A      Post surgical pathology:  N/A      Per MD note:   \"Persistent + beta HCG <10 x 6mos. Faint +UPT in office today.   For heterophilic Ab testing.   Family h/o breast and ovarian CA with also same h/o with + beta hCG:  for CA-125 and also for GynOnc eConsult.   Check genetic testing results, and upload to [facility] portal if possible.  eConsult to GynOnc requested.    Await results and recommendations from GynOnc.   Patient verbalized understanding of today's discussion with all questions and concerns addressed to her satisfaction.  \"  "

## 2025-01-16 DIAGNOSIS — Z80.41 FAMILY HISTORY OF OVARIAN CANCER: ICD-10-CM

## 2025-01-16 DIAGNOSIS — R89.9 ABNORMAL LABORATORY TEST: Primary | ICD-10-CM

## 2025-01-17 ENCOUNTER — E-CONSULT (OUTPATIENT)
Dept: GYNECOLOGIC ONCOLOGY | Facility: CLINIC | Age: 45
End: 2025-01-17
Payer: COMMERCIAL

## 2025-01-17 DIAGNOSIS — R79.89 ELEVATED SERUM HCG: Primary | ICD-10-CM

## 2025-01-17 PROCEDURE — 99451 NTRPROF PH1/NTRNET/EHR 5/>: CPT | Performed by: OBSTETRICS & GYNECOLOGY

## 2025-01-17 NOTE — PROGRESS NOTES
E-Consult  Morenita Jensen 44 y.o. female MRN: [id number]  Encounter Date: 01/17/25      Reason for Consult / Principal Problem: elevated hcg    Consulting Provider: Germaine Castellanos MD    Requesting Provider: DAVID Daily MD       ASSESSMENT:  43yo with h/o BTL, without evidence of IUP and persistent elevated quantitative hcg.Pt with history of +UPT over the years but blood tests have always been negative. Intermittent pelvic u/s without abnormality.     Hcg initially checked in ED found to be 6 but repeat was negative and no further work up initiated.     Trend:  12/2023 hcg borderline (6)  2/2024 hcg neg  7/29/24- hcg neg (3)  8/9/24- hcg neg (<1)  12/11/24- + UPT home test  12/31/24- 8.5    Pelvic US without abnormality    RECOMMENDATIONS:  When considering elevated hcg (for which hers is intermittent slightly elevated) need to consider reasons:     1. Pregnancy    - no evidence of pregnancy on imaging    - BTL   - would consider D+C to evaluate and ensure no chorionic villi present if persist abnormal finding.      2. Pituitary/hormonal  - this is more likely to be seen in post menopausal woman with a slightly elevated hcg  - however, given unknown cause, would check FSH, LH, TSH   - would also consider MRI head to rule out brain and/or pituitary pathology      3. Antibody/false positive  - urine HCG also positive intermittently so this is unlikely the cause of her elevated HCG     4. Tumor/malignancy  - an hcg secreting tumor is most likely from the uterus/ovaries however there is a possibility that a tumor or malignancy that secretes HCG could be else where in the body   - would consider CT chest, abdomen, pelvis to ensure we are not missing any abnormalities if D+C was negative or MRI pelvis to assess gyn organs.   - can send for hyperglycosylated hcg to r/o malignant process as well     I suspect this is a lab abnormality given its low level and stability without continued rise however consideration  of ruling out these alternatives should be made.     Total time spent >5 min, >50% time spent reviewing/analyzing record, written report will be generated in the EMR. .

## 2025-01-20 NOTE — PATIENT COMMUNICATION
Patient reports she got a call from GYN onc to schedule an appt however was not aware that she was to be scheduling with them as she notes that Dr Daily had advised, that she was going to reach out to them via E consult. Patient with questions as to their recommendations and what Dr Daily recommends. Notes she cannot get an MRI because she has a pacemaker.

## 2025-01-22 PROBLEM — J01.00 ACUTE NON-RECURRENT MAXILLARY SINUSITIS: Status: RESOLVED | Noted: 2018-01-09 | Resolved: 2025-01-22

## 2025-02-03 NOTE — ASSESSMENT & PLAN NOTE
Morenita Jensen is a 44 y.o. female with h/o complete transposition of the great vessels, RBBB/SA node dysfunction with pacemaker, gilberts syndrome, irregular menses and intermittent elevation of hcg presents for consultation    We had a long discussion regarding hcg and its inaccuracies and possibilities for production. We discussed that when considering elevated hcg (for which hers is intermittent slightly elevated) need to consider the following reasons:    1. Pregnancy    - she has no evidence of pregnancy on imaging    - h/o BTL   - if all else is negative, would consider D+C to evaluate and ensure no chorionic villi present if persist abnormal finding. Although I suspect this a low yield procedure     2. Pituitary/hormonal  - this is more likely to be seen in post menopausal woman with a slightly elevated hcg  - however, given unknown cause, will check FSH, LH, TSH, prolactin  - would also consider MRI head to rule out brain and/or pituitary pathology. Will need to reach out to radiology to see if possible with pacemaker in place     3. Antibody/false positive  - urine HCG also positive intermittently so this is unlikely the cause of her elevated HCG     4. Tumor/malignancy  - an hcg secreting tumor is most likely from the uterus/ovaries however there is a possibility that a tumor or malignancy that secretes HCG could be else where in the body   - would consider CT chest, abdomen, pelvis to ensure we are not missing any abnormalities  - she has had a CT 12/31/2024 that showed slightly enlarged para-aortic lymph nodes however these have been stable since 2013 and unlikely to be anything of concern. Could obtain CT chest to complete work up.   - will also  send for hyperglycosylated hcg to r/o malignant process as well    All in all, I suspect this is a lab abnormality given its low level, intermittently negative and without continued rise however consideration of ruling out these alternatives should be  made.     I will f/u with her in office after these tests are obtained.     Orders:  •  Ambulatory Referral to Gynecologic Oncology  •  Prolactin; Future  •  TSH, 3rd generation with Free T4 reflex; Future  •  Follicle stimulating hormone; Future  •  Luteinizing hormone; Future  •  hCG, quantitative; Future  •  HCG, tumor marker; Future  •  MISCELLANEOUS LAB TEST; Future  •  CT chest wo contrast; Future

## 2025-02-03 NOTE — ASSESSMENT & PLAN NOTE
Followed by shanice      [Dear  ___] : Dear  [unfilled], [Please see my note below.] : Please see my note below. [Consult Closing:] : Thank you very much for allowing me to participate in the care of this patient.  If you have any questions, please do not hesitate to contact me. [Sincerely,] : Sincerely, [DrHill  ___] : Dr. JETT [Courtesy Letter:] : I had the pleasure of seeing your patient, [unfilled], in my office today. [FreeTextEntry3] : Carie Sams MD\par Fellow, Division of Allergy and Immunology. \par \par Jostin Perez MD\par  for Academic Affairs, Department of Pediatrics\par Chief, Division of Allergy/Immunology\par [place] [facility]\par \par Brant Duran Professor of Pediatrics, Professor of Molecular Medicine\par [employer] [facility]\par \par

## 2025-02-03 NOTE — ASSESSMENT & PLAN NOTE
We reviewed the possible etiologies of her AUB, including polyp, adenomyosis, leiomyoma, hyperplasia/malignancy, coagulopathy, ovulatory dysfunction, endometrial, iatrogenic.   We reviewed In women age 40 to menopause, the most common cause is anovulatory bleeding as a result of declining ovarian function.  Pelvic ultrasound with thin EMS.   EMB recommended in patients age > 45 with AUB.  EMB is also indicated in patients younger than 45 years with history of unopposed estrogen exposure (e.g. obesity or PCOS), failed medical management, or persistent AUB.    If all else is negative, will consider EMB to just ensure negative and close the loop.

## 2025-02-03 NOTE — PROGRESS NOTES
Name: Morenita Jensen      : 1980      MRN: [id number]  Encounter Provider: Germaine Castellanos MD  Encounter Date: 2025   Encounter department: [facility] [place]  :  Assessment & Plan  History of complete transposition of great vessels  Followed by shanice       Pacemaker         Irregular menses  We reviewed the possible etiologies of her AUB, including polyp, adenomyosis, leiomyoma, hyperplasia/malignancy, coagulopathy, ovulatory dysfunction, endometrial, iatrogenic.   We reviewed In women age 40 to menopause, the most common cause is anovulatory bleeding as a result of declining ovarian function.  Pelvic ultrasound with thin EMS.   EMB recommended in patients age > 45 with AUB.  EMB is also indicated in patients younger than 45 years with history of unopposed estrogen exposure (e.g. obesity or PCOS), failed medical management, or persistent AUB.    If all else is negative, will consider EMB to just ensure negative and close the loop.          Elevated serum hCG  Morenita Jensen is a 44 y.o. female with h/o complete transposition of the great vessels, RBBB/SA node dysfunction with pacemaker, gilberts syndrome, irregular menses and intermittent elevation of hcg presents for consultation    We had a long discussion regarding hcg and its inaccuracies and possibilities for production. We discussed that when considering elevated hcg (for which hers is intermittent slightly elevated) need to consider the following reasons:    1. Pregnancy    - she has no evidence of pregnancy on imaging    - h/o BTL   - if all else is negative, would consider D+C to evaluate and ensure no chorionic villi present if persist abnormal finding. Although I suspect this a low yield procedure     2. Pituitary/hormonal  - this is more likely to be seen in post menopausal woman with a slightly elevated hcg  - however, given unknown cause, will check FSH, LH, TSH, prolactin  - would also consider MRI head  to rule out brain and/or pituitary pathology. Will need to reach out to radiology to see if possible with pacemaker in place     3. Antibody/false positive  - urine HCG also positive intermittently so this is unlikely the cause of her elevated HCG     4. Tumor/malignancy  - an hcg secreting tumor is most likely from the uterus/ovaries however there is a possibility that a tumor or malignancy that secretes HCG could be else where in the body   - would consider CT chest, abdomen, pelvis to ensure we are not missing any abnormalities  - she has had a CT 12/31/2024 that showed slightly enlarged para-aortic lymph nodes however these have been stable since 2013 and unlikely to be anything of concern. Could obtain CT chest to complete work up.   - will also  send for hyperglycosylated hcg to r/o malignant process as well    All in all, I suspect this is a lab abnormality given its low level, intermittently negative and without continued rise however consideration of ruling out these alternatives should be made.     I will f/u with her in office after these tests are obtained.     Orders:  •  Ambulatory Referral to Gynecologic Oncology  •  Prolactin; Future  •  TSH, 3rd generation with Free T4 reflex; Future  •  Follicle stimulating hormone; Future  •  Luteinizing hormone; Future  •  hCG, quantitative; Future  •  HCG, tumor marker; Future  •  MISCELLANEOUS LAB TEST; Future  •  CT chest wo contrast; Future    Gilbert's syndrome  Elevated bili  No flares  stable       Abnormal endocrine laboratory test finding    Orders:  •  Ambulatory Referral to Gynecologic Oncology    Amenorrhea  Likely anovulatory. Discussed OCPs again to correct menses which would also likely suppress her hcg however she declines as she was on for many years and reason she got her tubes tied.                History of Present Illness   Reason for Visit / CC: elevated hcg      Morenita Jensen is a 44 y.o. female with h/o complete transposition of the  "great vessels, RBBB/SA node dysfunction with pacemaker, gilberts syndrome, irregular menses and intermittent elevation of hcg presents for consultation. Case was sent as e-consult previously.     Pt reports all of this started after had a coughing episode with CP/SOB after intercourse and pregnancy test in the ED Was positive. She reports had a few of these episodes in the past. CXR showed vascular congestion.     Pt reports she has never had regular periods. Has gone a while without a period. Has had 2-3x a month. Was on OCPs \"forever\". Just stopped 2 years ago. Periods were still irregular at that time. Last menses 11/15. History of left ovarian cystectomy in 2005 that required ex-lap for concern for bleed. She subsequently had a SBO in 2012 with ex-lap secondary to scar tissue.     Pt does note she felt more bloated during the time of positive hcg. This has not resolved.     Trend:  12/2023 hcg borderline (6)  2/2024 hcg neg  7/29/24- hcg neg (3)  8/9/24- hcg neg (<1)  12/11/24- + UPT home test  12/31/24- 8.5  1/9/25 UPT neg           Pertinent Medical History         Review of Systems   Constitutional: Negative.    HENT: Negative.     Eyes: Negative.    Respiratory: Negative.     Cardiovascular: Negative.    Gastrointestinal: Negative.    Endocrine: Negative.    Genitourinary:  Positive for menstrual problem.   Musculoskeletal: Negative.    Neurological: Negative.    Psychiatric/Behavioral: Negative.      A complete review of systems is negative other than that noted above in the HPI.       Objective   /80 (BP Location: Left arm, Patient Position: Sitting, Cuff Size: Standard)   Pulse 72   Temp 98.6 °F (37 °C) (Temporal)   Ht 5' 1.5\" (1.562 m)   Wt 67.4 kg (148 lb 8 oz)   SpO2 95%   BMI 27.60 kg/m²     Body mass index is 27.6 kg/m².  Pain Screening:     ECOG ECOG Performance Status: 0 - Fully active, able to carry on all pre-disease performance without restriction   Physical Exam  HENT:      Head: " "Normocephalic and atraumatic.      Nose: Nose normal.   Cardiovascular:      Rate and Rhythm: Normal rate and regular rhythm.   Pulmonary:      Effort: Pulmonary effort is normal.   Abdominal:      General: There is no distension.      Palpations: Abdomen is soft. There is no mass.   Genitourinary:     Comments: defer  Musculoskeletal:         General: No swelling. Normal range of motion.      Cervical back: Normal range of motion.   Skin:     General: Skin is warm and dry.   Neurological:      General: No focal deficit present.      Mental Status: She is alert.   Psychiatric:         Mood and Affect: Mood normal.          Labs: I have reviewed pertinent labs.   No results found for: \"\"  Lab Results   Component Value Date/Time    Potassium 3.8 12/31/2024 01:21 PM    Potassium 4.0 07/29/2024 12:00 AM    Chloride 106 12/31/2024 01:21 PM    Chloride 105 07/29/2024 12:00 AM    CO2 29 12/31/2024 01:21 PM    CO2 21 07/29/2024 12:00 AM    BUN 10 12/31/2024 01:21 PM    BUN 10 07/29/2024 12:00 AM    Creatinine 0.63 12/31/2024 01:21 PM    Calcium 9.4 12/31/2024 01:21 PM    AST 21 12/31/2024 01:21 PM    AST 21 07/29/2024 12:00 AM    ALT 22 12/31/2024 01:21 PM    ALT 20 07/29/2024 12:00 AM    Alkaline Phosphatase 56 12/31/2024 01:21 PM    eGFR 110 12/31/2024 01:21 PM     Lab Results   Component Value Date/Time    WBC 6.48 12/31/2024 01:21 PM    Hemoglobin 13.1 12/31/2024 01:21 PM    Hematocrit 40.1 12/31/2024 01:21 PM    MCV 89 12/31/2024 01:21 PM    Platelets 223 12/31/2024 01:21 PM     Lab Results   Component Value Date/Time    Absolute Neutrophils 3.85 12/31/2024 01:21 PM        Trend:  No results found for: \"\"    Radiology Results Review : I have reviewed images/report studies in PACS as described above (in the HPI).        "

## 2025-02-04 ENCOUNTER — CONSULT (OUTPATIENT)
Dept: GYNECOLOGIC ONCOLOGY | Facility: CLINIC | Age: 45
End: 2025-02-04
Payer: COMMERCIAL

## 2025-02-04 VITALS
SYSTOLIC BLOOD PRESSURE: 122 MMHG | HEART RATE: 72 BPM | TEMPERATURE: 98.6 F | HEIGHT: 62 IN | DIASTOLIC BLOOD PRESSURE: 80 MMHG | WEIGHT: 148.5 LBS | OXYGEN SATURATION: 95 % | BODY MASS INDEX: 27.33 KG/M2

## 2025-02-04 DIAGNOSIS — R79.89 ELEVATED SERUM HCG: Primary | ICD-10-CM

## 2025-02-04 DIAGNOSIS — N92.6 IRREGULAR MENSES: ICD-10-CM

## 2025-02-04 DIAGNOSIS — Z87.74 HISTORY OF COMPLETE TRANSPOSITION OF GREAT VESSELS: ICD-10-CM

## 2025-02-04 DIAGNOSIS — Z95.0 PACEMAKER: ICD-10-CM

## 2025-02-04 DIAGNOSIS — R68.89 ABNORMAL ENDOCRINE LABORATORY TEST FINDING: ICD-10-CM

## 2025-02-04 DIAGNOSIS — E80.4 GILBERT'S SYNDROME: ICD-10-CM

## 2025-02-04 DIAGNOSIS — N91.2 AMENORRHEA: ICD-10-CM

## 2025-02-04 PROCEDURE — 99214 OFFICE O/P EST MOD 30 MIN: CPT | Performed by: OBSTETRICS & GYNECOLOGY

## 2025-02-04 NOTE — ASSESSMENT & PLAN NOTE
Likely anovulatory. Discussed OCPs again to correct menses which would also likely suppress her hcg however she declines as she was on for many years and reason she got her tubes tied.

## 2025-02-05 LAB
FSH SERPL-ACNC: 74.4 MIU/ML
HCG INTACT+B SERPL-ACNC: 2 MIU/ML
LH SERPL-ACNC: 61.8 MIU/ML
PROLACTIN SERPL-MCNC: 12.9 NG/ML (ref 4.8–33.4)
TSH SERPL DL<=0.005 MIU/L-ACNC: 0.43 UIU/ML (ref 0.45–4.5)

## 2025-02-20 ENCOUNTER — TELEPHONE (OUTPATIENT)
Dept: GYNECOLOGIC ONCOLOGY | Facility: CLINIC | Age: 45
End: 2025-02-20

## 2025-02-20 ENCOUNTER — OFFICE VISIT (OUTPATIENT)
Dept: URGENT CARE | Facility: CLINIC | Age: 45
End: 2025-02-20
Payer: COMMERCIAL

## 2025-02-20 VITALS
SYSTOLIC BLOOD PRESSURE: 109 MMHG | BODY MASS INDEX: 27.82 KG/M2 | RESPIRATION RATE: 18 BRPM | TEMPERATURE: 97.5 F | WEIGHT: 151.2 LBS | DIASTOLIC BLOOD PRESSURE: 55 MMHG | HEART RATE: 94 BPM | OXYGEN SATURATION: 97 % | HEIGHT: 62 IN

## 2025-02-20 DIAGNOSIS — J02.9 ACUTE PHARYNGITIS, UNSPECIFIED ETIOLOGY: Primary | ICD-10-CM

## 2025-02-20 DIAGNOSIS — R79.89 ELEVATED SERUM HCG: Primary | ICD-10-CM

## 2025-02-20 LAB — S PYO AG THROAT QL: NEGATIVE

## 2025-02-20 PROCEDURE — 99213 OFFICE O/P EST LOW 20 MIN: CPT

## 2025-02-20 PROCEDURE — 87880 STREP A ASSAY W/OPTIC: CPT

## 2025-02-20 NOTE — PROGRESS NOTES
[facility]        NAME: Morenita Jensen is a 44 y.o. female  : 1980    MRN: [id number]  DATE: 2025  TIME: 4:57 PM    Assessment and Plan   Acute pharyngitis, unspecified etiology [J02.9]  1. Acute pharyngitis, unspecified etiology  POCT rapid strepA            Patient Instructions   Increase your fluids  Saline nasal spray as often as needed  Gargle with salt water  Suck on hard candies to give moisture to the back your throat    Follow up with PCP in 3-5 days.  Proceed to  ER if symptoms worsen.    If tests have been performed at [facility], our office will contact you with results if changes need to be made to the care plan discussed with you at the visit.  You can review your full results on [facility].    Chief Complaint     Chief Complaint   Patient presents with    Laryngitis     Started on Tuesday with scratchy throat, denies fever, denies other symptoms, and she's been taking cough drops for relief          History of Present Illness       This is a 44-year-old female who presents today with scratchy dry throat that started on Tuesday.  She states that she is losing her voice.  She denies any nasal congestion no postnasal drip.  She states her  is sick at home.  She denies any fever or chills no cough.  She states 2 weeks ago she came home from work and felt really tired had bodyaches and a cough but that resolved.  She denies any sinus pain or pressure no shortness of breath or chest pain.        Review of Systems   Review of Systems   Constitutional:  Negative for chills and fever.   HENT:  Negative for congestion, ear pain, postnasal drip, sinus pain and sore throat.    Eyes:  Negative for pain and itching.   Respiratory:  Negative for cough, shortness of breath and wheezing.    Cardiovascular:  Negative for chest pain and palpitations.   Gastrointestinal:  Negative for abdominal pain, constipation, diarrhea, nausea and vomiting.   Genitourinary:  Negative  for difficulty urinating and hematuria.   Musculoskeletal:  Negative for arthralgias and myalgias.   Skin:  Negative for rash.   Neurological:  Negative for dizziness, light-headedness and headaches.   Psychiatric/Behavioral:  Negative for agitation and sleep disturbance. The patient is not nervous/anxious.          Current Medications       Current Outpatient Medications:     ascorbic acid (VITAMIN C) 500 mg tablet, Take by mouth, Disp: , Rfl:     aspirin (ECOTRIN LOW STRENGTH) 81 mg EC tablet, Take 81 mg by mouth daily, Disp: , Rfl:     cetirizine (ZyrTEC) 10 mg tablet, Take 10 mg by mouth daily, Disp: , Rfl:     Cholecalciferol 25 MCG (1000 UT) tablet, Take 1,000 Units by mouth daily, Disp: , Rfl:     Cyanocobalamin (VITAMIN B 12 PO), Take by mouth, Disp: , Rfl:     ELDERBERRY PO, Take by mouth, Disp: , Rfl:     furosemide (LASIX) 20 mg tablet, TAKE ONE TABLET BY MOUTH WEEKLY AND AS NEEDED, Disp: , Rfl:     meclizine (ANTIVERT) 25 mg tablet, Take 1 tablet (25 mg total) by mouth every 8 (eight) hours as needed for dizziness, Disp: 30 tablet, Rfl: 2    metoprolol succinate (TOPROL-XL) 25 mg 24 hr tablet, Take 25 mg by mouth daily, Disp: , Rfl:     MULTIPLE VITAMINS-MINERALS PO, Take 1 tablet by mouth daily, Disp: , Rfl:     sotalol (BETAPACE) 120 mg tablet, Take 1 tablet by mouth 2 (two) times a day, Disp: , Rfl:     Current Allergies     Allergies as of 02/20/2025 - Reviewed 02/20/2025   Allergen Reaction Noted    Enalapril  04/21/2012    Other  02/27/2009    Penicillins Hives and Rash 10/09/2008            The following portions of the patient's history were reviewed and updated as appropriate: allergies, current medications, past family history, past medical history, past social history, past surgical history and problem list.     Past Medical History:   Diagnosis Date    Acute respiratory failure with hypoxia (HCC) 02/27/2024    Allergic     Atrial fibrillation (HCC)     last assessed 03/14/2014    BRCA1  negative     BRCA2 negative     Complete transposition of great vessels     last assessed 02/14/2014    Coronary artery disease     Eczema     GERD (gastroesophageal reflux disease)     Heart disease 11/5/82    Heart murmur     History of cardiac cath     Cath RVOT subpulmonary obstruction, last assessed 06/19/2012    History of transfusion 10/83, 12/05    Hyperbilirubinemia (congenital)     Migraine     Otitis media     Skin cancer 10/2007    SSS (sick sinus syndrome) (HCC)     Torsion of small intestine (HCC)        Past Surgical History:   Procedure Laterality Date    ADENOIDECTOMY      onset childhood    ATRIAL SEPTECTOMY      balloon transvenous method    BREAST BIOPSY Right     2019 benign    CARDIAC CATHETERIZATION  02/20/2024    CARDIAC PACEMAKER PLACEMENT      last assessed 03/14/2014    LAPAROSCOPIC LYSIS INTESTINAL ADHESIONS      OTHER SURGICAL HISTORY      transposition repair mustard procedure, description 11 months    TONSILLECTOMY      TUBAL LIGATION      last assessed 08/17/2012    US GUIDED BREAST BIOPSY RIGHT COMPLETE Right 11/18/2019       Family History   Problem Relation Age of Onset    No Known Problems Mother     Cancer Father 63        hodgkins lymphoma     Melanoma Father     Colon cancer Sister 22        carcinoid tumor in the rectum    No Known Problems Maternal Aunt     Breast cancer Paternal Aunt 52    Ovarian cancer Paternal Aunt 30    No Known Problems Paternal Aunt     Heart disease Maternal Grandmother     Hyperlipidemia Maternal Grandmother     Cancer Maternal Grandfather         tongue    Diabetes Paternal Grandmother     Hypertension Paternal Grandmother     Lung cancer Paternal Grandmother     Heart disease Paternal Grandfather     Hyperlipidemia Paternal Grandfather     Lung cancer Paternal Grandfather     No Known Problems Daughter          Medications have been verified.        Objective   /55 (BP Location: Right arm, Patient Position: Sitting, Cuff Size: Standard)    "Pulse 94   Temp 97.5 °F (36.4 °C) (Tympanic)   Resp 18   Ht 5' 2\" (1.575 m)   Wt 68.6 kg (151 lb 3.2 oz)   LMP 02/07/2025   SpO2 97%   BMI 27.65 kg/m²   Patient's last menstrual period was 02/07/2025.       Physical Exam     Physical Exam  Vitals reviewed.   Constitutional:       General: She is not in acute distress.     Appearance: Normal appearance.   HENT:      Head: Normocephalic and atraumatic.      Right Ear: Tympanic membrane and ear canal normal.      Left Ear: Tympanic membrane and ear canal normal.      Nose: No congestion or rhinorrhea.      Mouth/Throat:      Mouth: Mucous membranes are moist.      Pharynx: No oropharyngeal exudate or posterior oropharyngeal erythema.   Eyes:      Extraocular Movements: Extraocular movements intact.      Conjunctiva/sclera: Conjunctivae normal.      Pupils: Pupils are equal, round, and reactive to light.   Cardiovascular:      Rate and Rhythm: Normal rate and regular rhythm.      Pulses: Normal pulses.      Heart sounds: Normal heart sounds. No murmur heard.  Pulmonary:      Effort: Pulmonary effort is normal. No respiratory distress.      Breath sounds: Normal breath sounds.   Abdominal:      General: Abdomen is flat. Bowel sounds are normal. There is no distension.      Palpations: Abdomen is soft.      Tenderness: There is no abdominal tenderness. There is no guarding or rebound.   Musculoskeletal:         General: No swelling or tenderness. Normal range of motion.      Cervical back: Normal range of motion and neck supple. No tenderness.   Skin:     General: Skin is warm.      Capillary Refill: Capillary refill takes less than 2 seconds.   Neurological:      General: No focal deficit present.      Mental Status: She is alert.   Psychiatric:         Mood and Affect: Mood normal.         Behavior: Behavior normal.         Judgment: Judgment normal.                   "

## 2025-02-20 NOTE — TELEPHONE ENCOUNTER
Called and left a message to talk about the orders Kait has put in. Patient needs to call back.     On 02/24/25 for a CT Chest however this is not meeting medical necessity per her insurance.     The insurance is advising a CT Chest is recommended after a Chest Xray is done that showed abnormal findings and needs to be followed up with a CT.     Patient will need to get a Chest Xray done prior to the CT.  Patient needs to call back to be informed.

## 2025-02-20 NOTE — PATIENT INSTRUCTIONS
Increase your fluids  Saline nasal spray as often as needed  Gargle with salt water  Suck on hard candies to give moisture to the back your throat

## 2025-02-21 ENCOUNTER — APPOINTMENT (OUTPATIENT)
Dept: RADIOLOGY | Facility: CLINIC | Age: 45
End: 2025-02-21
Payer: COMMERCIAL

## 2025-02-21 ENCOUNTER — TELEPHONE (OUTPATIENT)
Dept: GYNECOLOGIC ONCOLOGY | Facility: CLINIC | Age: 45
End: 2025-02-21

## 2025-02-21 PROCEDURE — 71046 X-RAY EXAM CHEST 2 VIEWS: CPT

## 2025-02-21 NOTE — TELEPHONE ENCOUNTER
Called and left message on patient and spouse's phone to call back about the chest CT.     2/24/25 for a CT Chest however this is not meeting medical necessity per her insurance.     The insurance is advising a CT Chest is recommended after a Chest Xray is done that showed abnormal findings and needs to be followed up with a CT.      Patient will need to get a Chest Xray done prior to the CT or can let us know if she had it done already.  Patient needs to call back to be informed.

## 2025-02-21 NOTE — TELEPHONE ENCOUNTER
Patient calling in to report she will going for her chest XR this afternoon and will have this completed today. We discussed and she is aware of her CT Chest for 2/24 and thanked me.

## 2025-02-24 LAB — B-HEM STREP SPEC QL CULT: NEGATIVE

## 2025-03-03 NOTE — PROGRESS NOTES
Name: Morenita Jensen      : 1980      MRN: [id number]  Encounter Provider: Germaine Castellanos MD  Encounter Date: 3/4/2025   Encounter department: [facility]  :  Assessment & Plan  Elevated serum hCG  Morenita Jensen is a 44 y.o. female with h/o complete transposition of the great vessels, RBBB/SA node dysfunction with pacemaker, gilberts syndrome, irregular menses and intermittent elevation of hcg presents for follow up.    Labs and work up reviewed. Elevated FSH/LH c/w post menopasual/perimenopausal state. We discussed this is the liekly cause of her low level elevated hcg and also the likely cause of her irregular bleeding.   We discussed repeating FSH/E2 to confirm this diagnosis. She is thankfully not terribly symptomatic and hterefore may not need any treatment/hormone replacement but can discuss further with her gyn.     We reviewed the possible etiologies of her AUB, including polyp, adenomyosis, leiomyoma, hyperplasia/malignancy, coagulopathy, ovulatory dysfunction, endometrial, iatrogenic.    In women age 40 to menopause, the most common cause is anovulatory bleeding as a result of declining ovarian function and in her case, perimenopause.   We discussed obtaining an EMB to ensure no underlying malignancy    I will message her with the result.       Orders:  •  TSH, 3rd generation with Free T4 reflex; Future  •  T4, free; Future    Early onset menopause  See above   Orders:  •  Follicle stimulating hormone; Future  •  Luteinizing hormone; Future  •  Estradiol; Future    Irregular menses  See above   Orders:  •  Tissue Exam  •  Endometrial biopsy    Low TSH level  Repeat pending with T4                 History of Present Illness   Reason for Visit / CC: follow up    Morenita Jensen is a 44 y.o. female   Morenita Jensen is a 44 y.o. female with h/o complete transposition of the great vessels, RBBB/SA node dysfunction with pacemaker, gilberts syndrome,  irregular menses and intermittent elevation of hcg presents for follow up.  Pt reports just ended a menses but had not had one for 5 months prior to this.     Trend:  12/2023 hcg borderline (6)  2/2024 hcg neg  7/29/24- hcg neg (3)  8/9/24- hcg neg (<1)  12/11/24- + UPT home test  12/31/24- 8.5  1/9/25 UPT neg  2/4/25 hcg neg (2)     Slightly low TSH.   Elevated FSH/LH c/w menopausal status.           Pertinent Medical History            Review of Systems   Constitutional: Negative.    HENT: Negative.     Eyes: Negative.    Respiratory: Negative.     Cardiovascular: Negative.    Gastrointestinal: Negative.    Endocrine: Negative.    Genitourinary: Negative.    Musculoskeletal: Negative.    Neurological: Negative.    Psychiatric/Behavioral: Negative.      A complete review of systems is negative other than that noted above in the HPI.       Objective   /78 (BP Location: Left arm, Patient Position: Sitting, Cuff Size: Standard)   Pulse 100   Temp 98.5 °F (36.9 °C) (Temporal)   Wt 68 kg (150 lb)   LMP 02/07/2025   SpO2 98%   BMI 27.44 kg/m²     Body mass index is 27.44 kg/m².  Pain Screening:  Pain Score: 0-No pain  ECOG     Physical Exam  HENT:      Head: Normocephalic and atraumatic.      Nose: Nose normal.   Cardiovascular:      Rate and Rhythm: Normal rate and regular rhythm.   Pulmonary:      Effort: Pulmonary effort is normal.   Abdominal:      General: There is no distension.      Palpations: Abdomen is soft. There is no mass.   Genitourinary:     Comments: defer  Musculoskeletal:         General: No swelling. Normal range of motion.      Cervical back: Normal range of motion.   Skin:     General: Skin is warm and dry.   Neurological:      General: No focal deficit present.      Mental Status: She is alert.   Psychiatric:         Mood and Affect: Mood normal.        Endometrial biopsy    Date/Time: 3/4/2025 8:30 AM    Performed by: Germaine Castellanos MD  Authorized by: Germaine Castellanos MD  Universal  "Protocol:  procedure performed by consultantConsent: Verbal consent obtained.  Risks and benefits: risks, benefits and alternatives were discussed  Consent given by: patient  Patient identity confirmed: verbally with patient    Indication:     Indications: Other disorder of menstruation and other abnormal bleeding from female genital tract    Procedure:     Procedure: endometrial biopsy with Pipelle      A bivalve speculum was placed in the vagina: yes      The cervix was dilated: yes      Specimen collected: specimen collected and sent to pathology      Patient tolerated procedure well with no complications: yes    Findings:     Uterus size:  Non-gravid    Cervix: normal      Adnexa: normal        Labs: I have reviewed pertinent labs.   No results found for: \"\"  Lab Results   Component Value Date/Time    Potassium 3.8 12/31/2024 01:21 PM    Potassium 4.0 07/29/2024 12:00 AM    Chloride 106 12/31/2024 01:21 PM    Chloride 105 07/29/2024 12:00 AM    CO2 29 12/31/2024 01:21 PM    CO2 21 07/29/2024 12:00 AM    BUN 10 12/31/2024 01:21 PM    BUN 10 07/29/2024 12:00 AM    Creatinine 0.63 12/31/2024 01:21 PM    Calcium 9.4 12/31/2024 01:21 PM    AST 21 12/31/2024 01:21 PM    AST 21 07/29/2024 12:00 AM    ALT 22 12/31/2024 01:21 PM    ALT 20 07/29/2024 12:00 AM    Alkaline Phosphatase 56 12/31/2024 01:21 PM    eGFR 110 12/31/2024 01:21 PM     Lab Results   Component Value Date/Time    WBC 6.48 12/31/2024 01:21 PM    Hemoglobin 13.1 12/31/2024 01:21 PM    Hematocrit 40.1 12/31/2024 01:21 PM    MCV 89 12/31/2024 01:21 PM    Platelets 223 12/31/2024 01:21 PM     Lab Results   Component Value Date/Time    Absolute Neutrophils 3.85 12/31/2024 01:21 PM        Trend:  No results found for: \"\"    Radiology Results Review : I have reviewed images/report studies in PACS as described above (in the HPI).      Administrative Statements   I have spent a total time of 45 minutes in caring for this patient on the day of the " visit/encounter including Diagnostic results, Prognosis, Risks and benefits of tx options, Documenting in the medical record, Reviewing/placing orders in the medical record (including tests, medications, and/or procedures), and Communicating with other healthcare professionals .

## 2025-03-04 ENCOUNTER — OFFICE VISIT (OUTPATIENT)
Dept: GYNECOLOGIC ONCOLOGY | Facility: CLINIC | Age: 45
End: 2025-03-04
Payer: COMMERCIAL

## 2025-03-04 VITALS
DIASTOLIC BLOOD PRESSURE: 78 MMHG | OXYGEN SATURATION: 98 % | TEMPERATURE: 98.5 F | SYSTOLIC BLOOD PRESSURE: 120 MMHG | HEART RATE: 100 BPM | WEIGHT: 150 LBS | BODY MASS INDEX: 27.44 KG/M2

## 2025-03-04 DIAGNOSIS — R79.89 LOW TSH LEVEL: ICD-10-CM

## 2025-03-04 DIAGNOSIS — N92.6 IRREGULAR MENSES: ICD-10-CM

## 2025-03-04 DIAGNOSIS — R79.89 ELEVATED SERUM HCG: Primary | ICD-10-CM

## 2025-03-04 DIAGNOSIS — E28.319 EARLY ONSET MENOPAUSE: ICD-10-CM

## 2025-03-04 PROCEDURE — 58100 BIOPSY OF UTERUS LINING: CPT | Performed by: OBSTETRICS & GYNECOLOGY

## 2025-03-04 PROCEDURE — 99214 OFFICE O/P EST MOD 30 MIN: CPT | Performed by: OBSTETRICS & GYNECOLOGY

## 2025-03-04 PROCEDURE — 88305 TISSUE EXAM BY PATHOLOGIST: CPT | Performed by: PATHOLOGY

## 2025-03-04 RX ORDER — PYRIDOXINE HCL (VITAMIN B6) 25 MG
25 TABLET ORAL DAILY
COMMUNITY

## 2025-03-04 NOTE — ASSESSMENT & PLAN NOTE
Morenita Jensen is a 44 y.o. female with h/o complete transposition of the great vessels, RBBB/SA node dysfunction with pacemaker, gilberts syndrome, irregular menses and intermittent elevation of hcg presents for follow up.    Labs and work up reviewed. Elevated FSH/LH c/w post menopasual/perimenopausal state. We discussed this is the liekly cause of her low level elevated hcg and also the likely cause of her irregular bleeding.   We discussed repeating FSH/E2 to confirm this diagnosis. She is thankfully not terribly symptomatic and hterefore may not need any treatment/hormone replacement but can discuss further with her gyn.     We reviewed the possible etiologies of her AUB, including polyp, adenomyosis, leiomyoma, hyperplasia/malignancy, coagulopathy, ovulatory dysfunction, endometrial, iatrogenic.    In women age 40 to menopause, the most common cause is anovulatory bleeding as a result of declining ovarian function and in her case, perimenopause.   We discussed obtaining an EMB to ensure no underlying malignancy    I will message her with the result.       Orders:  •  TSH, 3rd generation with Free T4 reflex; Future  •  T4, free; Future

## 2025-03-04 NOTE — ASSESSMENT & PLAN NOTE
See above   Orders:  •  Follicle stimulating hormone; Future  •  Luteinizing hormone; Future  •  Estradiol; Future

## 2025-03-10 PROCEDURE — 88305 TISSUE EXAM BY PATHOLOGIST: CPT | Performed by: PATHOLOGY

## 2025-03-11 LAB
ESTRADIOL SERPL-MCNC: 7.2 PG/ML
FSH SERPL-ACNC: 118 MIU/ML
LH SERPL-ACNC: 81.4 MIU/ML
T4 FREE SERPL-MCNC: 1.18 NG/DL (ref 0.82–1.77)
TSH SERPL DL<=0.005 MIU/L-ACNC: 0.8 UIU/ML (ref 0.45–4.5)

## 2025-03-24 ENCOUNTER — TELEPHONE (OUTPATIENT)
Age: 45
End: 2025-03-24

## 2025-03-24 NOTE — TELEPHONE ENCOUNTER
Pt will be needing an AMB referral placed before insurance referral is submitted.    Once completed, please route to [facility] Procedure Authorizations for insurance referral, TY.      Patient is requesting an insurance referral for the following specialty:      Test Name / Order Name: [id number]    DX Code: R68.89    Date Of Service: 3/24    Location/Facility Name/Address/Phone #:   [facility]  [address] [place] [address]  Phone: [phone]    Location / Facility NPI: [id number]    [facility] Phone # To Reach The Patient: [phone]

## 2025-05-01 ENCOUNTER — TELEPHONE (OUTPATIENT)
Age: 45
End: 2025-05-01

## 2025-05-01 NOTE — TELEPHONE ENCOUNTER
Called patient and left message to call office to schedule another visit per Dr. Daily. Patient needs 30 minute for this appt. (OVL)

## 2025-05-20 ENCOUNTER — OFFICE VISIT (OUTPATIENT)
Age: 45
End: 2025-05-20

## 2025-05-20 VITALS — SYSTOLIC BLOOD PRESSURE: 110 MMHG | WEIGHT: 149 LBS | BODY MASS INDEX: 27.25 KG/M2 | DIASTOLIC BLOOD PRESSURE: 70 MMHG

## 2025-05-20 DIAGNOSIS — N93.9 ABNORMAL UTERINE BLEEDING (AUB): Primary | ICD-10-CM

## 2025-05-20 DIAGNOSIS — Z09 FOLLOW-UP EXAM: ICD-10-CM

## 2025-05-20 DIAGNOSIS — R92.30 DENSE BREAST TISSUE: ICD-10-CM

## 2025-05-20 NOTE — PATIENT INSTRUCTIONS
Now neg beta hCG!!  RTO for annual exam and repeat pap 9/2025 (had dysplastic cervical cells on last pap).  Coconut oil for lubrication.   Dense breast tissue:  for mammogram + u/s.

## 2025-05-20 NOTE — PROGRESS NOTES
What we talked about today:    Patient Instructions   Now neg beta hCG!!  RTO for annual exam and repeat pap 9/2025 (had dysplastic cervical cells on last pap).  Coconut oil for lubrication.   Dense breast tissue:  for mammogram + u/s.          Assessment/Plan:    1. Abnormal uterine bleeding (AUB)  2. Follow-up exam  3. Dense breast tissue  -     Mammo screening bilateral w 3d and cad; Future; Expected date: 08/20/2025  -     US breast screening bilateral complete (ABUS); Future; Expected date: 08/20/2025          Subjective:      Patient ID: Morenita Jensen is a 44 y.o. female, presents today complaining of f/u from persistent positive beta hCG.    HPI:    Pt states had 2 periods in February, was 15 days apart.  2/13 then 2/27 periods, lasting 4-5 days. Last Beta hCG was 2 on 2/4/25.  Then 4/19 and 5/14 periods.   Denies any hot flushes or perimenopausal sxs.  [place]cca dryness w/ intercourse.        3/4/25 EMB by GynOnc:    Path showed:       Final Diagnosis   A. Endometrium, biopsy:  -Benign weakly proliferative endometrium with focal glandular and stromal breakdown change.          Last pap 9/2024:  neg pap, neg HPV.      The following portions of the patient's history were reviewed and updated as appropriate: allergies, current medications, past family history, past medical history, past social history, past surgical history, and problem list.      Review of Systems   Constitutional:  Negative for chills, fatigue and fever.   HENT: Negative.     Eyes: Negative.    Respiratory: Negative.     Cardiovascular: Negative.    Gastrointestinal: Negative.  Negative for abdominal distention and abdominal pain.   Endocrine: Negative.    Musculoskeletal: Negative.    Skin: Negative.    Allergic/Immunologic: Negative.    Neurological: Negative.    Hematological: Negative.    Psychiatric/Behavioral: Negative.     All other systems reviewed and are negative.          Objective:      /70   Wt 67.6 kg (149 lb)   LMP  05/14/2025   BMI 27.25 kg/m²        Physical Exam  Vitals reviewed.   Constitutional:       General: She is not in acute distress.     Appearance: Normal appearance. She is not ill-appearing.   HENT:      Head: Normocephalic and atraumatic.     Eyes:      Extraocular Movements: Extraocular movements intact.     Pulmonary:      Effort: Pulmonary effort is normal.     Musculoskeletal:      Cervical back: Normal range of motion.     Skin:     General: Skin is warm and dry.     Neurological:      Mental Status: She is alert and oriented to person, place, and time.     Psychiatric:         Mood and Affect: Mood normal.         Behavior: Behavior normal.         Thought Content: Thought content normal.         Judgment: Judgment normal.           GYN exam: deferred        R Raiza Daily MD, FACOG

## 2025-06-30 ENCOUNTER — TELEPHONE (OUTPATIENT)
Age: 45
End: 2025-06-30

## 2025-06-30 NOTE — TELEPHONE ENCOUNTER
Patient is requesting an insurance referral for the following specialty:      Test Name / Order Name: [facility]    DX Code: R68.89    Date Of Service:  07/02/25    Location/Facility Name/Address/Phone #: [facility] - [phone]  [address] [facility] [address]    Location / Facility NPI: [id number]    Best Phone # To Reach The Patient:

## 2025-07-07 ENCOUNTER — NURSE TRIAGE (OUTPATIENT)
Age: 45
End: 2025-07-07

## 2025-07-07 NOTE — TELEPHONE ENCOUNTER
"REASON FOR CONVERSATION: Vaginitis    SYMPTOMS: white creamy discharge, slight itching     OTHER HEALTH INFORMATION: Pt calling in saying that she's having creamy white discharge, and slight itching.     PROTOCOL DISPOSITION: home care    CARE ADVICE PROVIDED: Pt is provided care advice, and is notified when to call back, pt verbalized understanding.      PRACTICE FOLLOW-UP: n/a         Reason for Disposition   Symptoms of a vaginal yeast infection (i.e., white, thick, cottage-cheese-like, itchy, not bad smelling discharge)    Answer Assessment - Initial Assessment Questions  1. DISCHARGE: \"Describe the discharge.\" (e.g., white, yellow, green, gray, foamy, cottage cheese-like)      Creamy white discharge   2. ODOR: \"Is there a bad odor?\"      No odor   3. ONSET: \"When did the discharge begin?\"      A couple of days ago   4. RASH: \"Is there a rash in the genital area?\" If Yes, ask: \"Describe it.\" (e.g., redness, blisters, sores, bumps)      Pt denies   5. ABDOMEN PAIN: \"Are you having any abdomen pain?\" If Yes, ask: \"What does it feel like? \" (e.g., crampy, dull, intermittent, constant)       Pt denies   6. ABDOMEN PAIN SEVERITY: If present, ask: \"How bad is it?\" (e.g., Scale 1-10; mild, moderate, or severe)      Pt denies   7. CAUSE: \"What do you think is causing the discharge?\" \"Have you had the same problem before?\" \"What happened then?\"      Pt unsure, pt went swimming   8. OTHER SYMPTOMS: \"Do you have any other symptoms?\" (e.g., fever, itching, vaginal bleeding, pain with urination, injury to genital area)      Pt denies fever,vaginal bleeding, pain with urination, injury to genital area    9. PREGNANCY: \"Is there any chance you are pregnant?\" \"When was your last menstrual period?\"      Pt denies pregnancy, LMP 6/13/25    Protocols used: Vaginal Discharge-Adult-[place]    "